# Patient Record
Sex: MALE | Race: WHITE | NOT HISPANIC OR LATINO | Employment: OTHER | URBAN - METROPOLITAN AREA
[De-identification: names, ages, dates, MRNs, and addresses within clinical notes are randomized per-mention and may not be internally consistent; named-entity substitution may affect disease eponyms.]

---

## 2017-08-17 ENCOUNTER — HOSPITAL ENCOUNTER (EMERGENCY)
Facility: CLINIC | Age: 67
Discharge: HOME | End: 2017-08-17
Attending: EMERGENCY MEDICINE | Admitting: EMERGENCY MEDICINE
Payer: MEDICARE

## 2017-08-17 VITALS
TEMPERATURE: 97.9 F | DIASTOLIC BLOOD PRESSURE: 109 MMHG | SYSTOLIC BLOOD PRESSURE: 164 MMHG | HEART RATE: 121 BPM | RESPIRATION RATE: 18 BRPM | OXYGEN SATURATION: 99 %

## 2017-08-17 DIAGNOSIS — S51.811A LACERATION OF RIGHT FOREARM, INITIAL ENCOUNTER: ICD-10-CM

## 2017-08-17 PROCEDURE — 99283 EMERGENCY DEPT VISIT LOW MDM: CPT

## 2017-08-17 PROCEDURE — 12002 RPR S/N/AX/GEN/TRNK2.6-7.5CM: CPT

## 2017-08-17 ASSESSMENT — ENCOUNTER SYMPTOMS
SHORTNESS OF BREATH: 0
NUMBNESS: 0
WOUND: 1
HEADACHES: 0
WEAKNESS: 0

## 2017-08-17 NOTE — ED AVS SNAPSHOT
Emergency Department    [address] [facility] [address]    Phone:  [phone]    Fax:  [phone]                                       Negro Aj   MRN: [id number]    Department:   Emergency Department   Date of Visit:  8/17/2017           After Visit Summary Signature Page     I have received my discharge instructions, and my questions have been answered. I have discussed any challenges I see with this plan with the nurse or doctor.    ..........................................................................................................................................  Patient/Patient Representative Signature      ..........................................................................................................................................  Patient Representative Print Name and Relationship to Patient    ..................................................               ................................................  Date                                            Time    ..........................................................................................................................................  Reviewed by Signature/Title    ...................................................              ..............................................  Date                                                            Time

## 2017-08-17 NOTE — ED PROVIDER NOTES
History     Chief Complaint:  Laceration (right arm after falling outside)      MATIAS Aj is a 67 year old male who presents with a right elbow laceration status post fall just prior to ED arrival. [facility] to the ED for laceration repair. The patient notes that he was out for a walk and lost his balance and fell while going up the stairs. He did not hit his head or lose consciousness and is not on any blood thinning medications. His only injury in the right elbow laceration. He denies pain or tenderness over the elbow. He reports pain is mild to moderate and bleeding is no longer present.     Allergies:  No known drug allergies      Medications:    Oxycodone  Aspirin 325 mg  Atarax  Simvastatin  Prozac  Lisinopril-hydrochlorothiazide  Zyprexa  Omeprazole  Ambien     Past Medical History:    Anxiety  Major depressive disorder  OCD  Right knee pain  Depression with anxiety    Past Surgical History:    Right knee arthroplasty  Right knee arthroscopy     Family History:    Depression, anxiety, bipolar    Social History:  Smoking status: Never smoker  Alcohol use: Yes, occasionally    Marital Status:  Single     Review of Systems   Respiratory: Negative for shortness of breath.    Cardiovascular: Negative for chest pain.   Skin: Positive for wound.   Neurological: Negative for syncope, weakness, numbness and headaches.   All other systems reviewed and are negative.      Physical Exam   First Vitals:  BP: (!) 164/109  Pulse: 121  Temp: 97.9  F (36.6  C)  Resp: 18  SpO2: 99 %      Physical Exam  General: Patient in mild distress.  Alert and cooperative with exam. Normal mentation  HEENT: NC/AT. Conjunctiva without injection or scleral icterus. External ears normal.  Respiratory: Breathing comfortably on room air  CV: Normal rate, all extremities well perfused  GI:  Non-distended abdomen  Skin: Warm, dry, no rashes/open wounds on exposed  skin  Musculoskeletal: RUE: CMS intact. 7 cm deep U-shaped laceration to the proximal forearm without evidence of bone/joint involvement or significant contamination on exam. Full active/passive range of motion of the elbow and shoulder.   Neuro: Alert, answers questions appropriately. No gross motor deficits    Emergency Department Course     Procedures:    Narrative: Procedure: Laceration Repair        LACERATION:  A U-shaped clean 7 cm laceration.      LOCATION:  Right elbow      FUNCTION:  Distally sensation, circulation, motor and tendon function are intact.      ANESTHESIA:  Local using 0.25% Marcaine plain total of 5 mLs      PREPARATION:  Irrigation with Normal Saline and Shur Clens      DEBRIDEMENT:  no debridement      CLOSURE:  Wound was closed with Two Layers: Subcutaneous layer closed with 3 x 5.0 Vicryl horizontal mattress Sutures. Skin closed with 14 x 4.0 Nylon using interrupted sutures     Emergency Department Course:  Past medical records, nursing notes, and vitals reviewed.  1000: I performed an exam of the patient and obtained history, as documented above.    Laceration repaired as above. Findings and plan explained to the Patient. Patient discharged home with instructions regarding supportive care, medications, and reasons to return. The importance of close follow-up was reviewed.      Impression & Plan      Medical Decision Making:  Negro Aj is a 67 year old male who presents for evaluation of a laceration to the right elbow after he lost his balance and fell. There was no report of injury to his head nor any loss of consciousness; he is not on blood thinners. The wound was carefully evaluated and explored.  The laceration was deep and was closed with Vicryl and Nylon sutures as noted above.  There is no evidence of muscular, tendon, or bony damage with this laceration.  No signs of foreign body.  Possible complications (infection, scarring) were reviewed with the patient.   Follow up with primary care as noted in the discharge section. Tetanus UTD.    Diagnosis:    ICD-10-CM    1. Laceration of right forearm, initial encounter S51.811A      Disposition:  Discharge home    Deep Rodrick  8/17/2017    EMERGENCY DEPARTMENT  Deep ARANGO am serving as a scribe at 10:57 AM on 8/17/2017 to document services personally performed by Kenyon Leong DO based on my observations and the provider's statements to me.       Kenyon Leong DO  08/17/17 2222

## 2017-08-17 NOTE — ED AVS SNAPSHOT
Emergency Department    [address] [facility] [address]    Phone:  [phone]    Fax:  [phone]                                       Negro Aj   MRN: [id number]    Department:   Emergency Department   Date of Visit:  8/17/2017           Patient Information     Date Of Birth          1950        Your diagnoses for this visit were:     Laceration of right forearm, initial encounter        You were seen by Kenyon Leong DO.      Follow-up Information     Follow up with Florentin Colby MD In 10 days.    Specialty:  Family Practice    Why:  suture removal    Contact information:    [facility] [address]  [address]  [facility] [address] [phone]          Follow up with  Emergency Department.    Specialty:  EMERGENCY MEDICINE    Why:  If symptoms worsen    Contact information:    [address] [facility] [id number] [phone]        Discharge Instructions       Discharge Instructions  Laceration (Cut)    You were seen today for a laceration (cut).  Your doctor examined your laceration for any problems such a buried foreign body (like glass, a splinter, or gravel), or injury to blood vessels, tendons, and nerves.  Your doctor may have also rinsed and/or scrubbed your laceration to help prevent an infection.  Your laceration may have been closed with glue, staples or sutures (stitches).      It may not be possible to find all problems with your laceration on the first visit, and we can't always prevent infections.  Antibiotics are only given when the benefit is more than the risk, and don't prevent all infections. Some lacerations are too high risk to close, and are left open to heal.  All lacerations, no matter how expertly repaired, will cause scarring.    Return to the Emergency Department right away if:    You have more redness, swelling, pain, drainage (pus), a bad smell, or red streaking from your laceration.      You  have a fever of 101oF or more.    You have bleeding that you can t stop at home. If your cut starts to bleed, hold pressure on the bleeding area with a clean cloth or put pressure over the bandage.  If the bleeding doesn t stop after using constant pressure for 30 minutes, you should return to the Emergency Department for further treatment.    An area past the laceration is cool, pale, or blue compared with the other side, or has a slower return of color when squeezed.    Your dressing seems too tight or starts to get uncomfortable or painful.    You have loss of normal function or use of an area, such as being unable to straighten or bend a finger normally.    You have a numb area past the laceration.    Return to the Emergency Department or see your regular doctor if:    The laceration starts to come open.     You have something coming out of the cut or a feeling that there is something in the laceration.    Your wound will not heal, or keeps breaking open. There can always be glass, wood, dirt or other things in any wound.  They won t always show up, even on x-rays.  If a wound doesn t heal, this may be why, and it is important to follow-up with your regular doctor.    Home Care:    Take your dressing off in 12 hours, or as instructed by your doctor, to check your laceration. Remove the dressing sooner if it seems too tight or painful, or if it is getting numb, tingly, or pale past the dressing.    Gently wash your laceration 2 times a day with clean cloth and soap.     It is okay to shower, but do not let the laceration soak in water.      If your laceration was closed with wound adhesive or strips: pat it dry and leave it open to the air.     For all other repairs: after you wash your laceration, or at least 2 times a day, apply bacitracin or other antibiotic ointment to the laceration, then cover it with a Band-Aid  or gauze.    Keep the laceration clean. Wear gloves or other protective clothing if you are  "around dirt.    Follow-up:    You need to follow-up with your regular doctor in 10 days.    Your sutures or staples need to be removed in 10 days. Schedule an appointment with your regular doctor to have this done.    Scars:  To help minimize scarring:    Wear sunscreen over the healed laceration when out in the sun.    Massage the area regularly.    You may use Vitamin E oil.    Wait a year.  Most scars will start to fade within a year.    Probiotics: If you have been given an antibiotic, you may want to also take a probiotic pill or eat yogurt with live cultures. Probiotics have \"good bacteria\" to help your intestines stay healthy. Studies have shown that probiotics help prevent diarrhea and other intestine problems (including C. diff infection) when you take antibiotics. You can buy these without a prescription in the pharmacy section of the store.     If you were given a prescription for medicine here today, be sure to read all of the information (including the package insert) that comes with your prescription.  This will include important information about the medicine, its side effects, and any warnings that you need to know about.  The pharmacist who fills the prescription can provide more information and answer questions you may have about the medicine.  If you have questions or concerns that the pharmacist cannot address, please call or return to the Emergency Department.     Return to the emergency department or seek medical care as instructed if your symptoms fail to improve or significantly worsen.    Take Acetaminophen (aka Tylenol) as needed for symptom/pain relief; use as directed.    Apply over the counter antibiotic ointment to laceration twice daily for two days.    Follow-up as indicated on page 1.  Maintain adequate hydration and get plenty of rest.      24 Hour Appointment Hotline       To make an appointment at any [facility], call [phone] ([phone]). If you don't have a " family doctor or clinic, we will help you find one. [facility] are conveniently located to serve the needs of you and your family.             Review of your medicines      Our records show that you are taking the medicines listed below. If these are incorrect, please call your family doctor or clinic.        Dose / Directions Last dose taken    AMBIEN PO   Dose:  10 mg        Take 10 mg by mouth nightly as needed for sleep   Refills:  0        aspirin 325 MG EC tablet   Dose:  325 mg   Quantity:  42 tablet        Take 1 tablet (325 mg) by mouth daily Take 1 tab daily x 6 wks   Refills:  0        hydrOXYzine 25 MG tablet   Commonly known as:  ATARAX   Dose:  25 mg   Quantity:  60 tablet        Take 1 tablet (25 mg) by mouth every 6 hours as needed for itching or other (spasm/pain)   Refills:  0        hypromellose-dextran Soln ophthalmic solution   Dose:  1 drop   Quantity:  1 Bottle        Place 1 drop into the right eye 4 times daily as needed for dry eyes   Refills:  1        lisinopril-hydrochlorothiazide 20-12.5 MG per tablet   Commonly known as:  PRINZIDE/ZESTORETIC   Dose:  0.5 tablet        Take 0.5 tablets by mouth daily   Refills:  0        order for DME   Quantity:  1 each        Equipment being ordered: Walker Wheels () and Walker () Treatment Diagnosis: Impaired gait   Refills:  0        oxyCODONE 5 MG IR tablet   Commonly known as:  ROXICODONE   Dose:  5-10 mg   Quantity:  50 tablet        Take 1-2 tablets (5-10 mg) by mouth every 3 hours as needed for moderate to severe pain Take 1 tab for pain rated 0-5, and 2 tabs for pain rated 5-10.   Refills:  0        PRILOSEC PO   Dose:  20 mg        Take 20 mg by mouth every morning   Refills:  0        PROZAC PO   Dose:  40 mg        Take 40 mg by mouth 2 times daily Pt takes 40 mg in am, then 3 hours later takes second 40 mg to = 80 mg/day   Refills:  0        senna-docusate 8.6-50 MG per tablet   Commonly known as:  SENOKOT-S;PERICOLACE    Dose:  1-2 tablet   Quantity:  60 tablet        Take 1-2 tablets by mouth 2 times daily Continue while taking narcotic pain medications. Hold for loose stools.   Refills:  1        SIMVASTATIN PO   Dose:  40 mg        Take 40 mg by mouth daily   Refills:  0        ZYPREXA PO   Dose:  20 mg        Take 20 mg by mouth daily (takes 2 x 10mg tablet)   Refills:  0                Orders Needing Specimen Collection     None      Pending Results     No orders found from 8/15/2017 to 8/18/2017.            Pending Culture Results     No orders found from 8/15/2017 to 8/18/2017.            Pending Results Instructions     If you had any lab results that were not finalized at the time of your Discharge, you can call the ED Lab Result RN at [phone]. You will be contacted by this team for any positive Lab results or changes in treatment. The nurses are available 7 days a week from 10A to 6:30P.  You can leave a message 24 hours per day and they will return your call.        Test Results From Your Hospital Stay               Clinical Quality Measure: Blood Pressure Screening     Your blood pressure was checked while you were in the emergency department today. The last reading we obtained was  BP: (!) 164/109 . Please read the guidelines below about what these numbers mean and what you should do about them.  If your systolic blood pressure (the top number) is less than 120 and your diastolic blood pressure (the bottom number) is less than 80, then your blood pressure is normal. There is nothing more that you need to do about it.  If your systolic blood pressure (the top number) is 120-139 or your diastolic blood pressure (the bottom number) is 80-89, your blood pressure may be higher than it should be. You should have your blood pressure rechecked within a year by a primary care provider.  If your systolic blood pressure (the top number) is 140 or greater or your diastolic blood pressure (the bottom number) is 90 or greater,  you may have high blood pressure. High blood pressure is treatable, but if left untreated over time it can put you at risk for heart attack, stroke, or kidney failure. You should have your blood pressure rechecked by a primary care provider within the next 4 weeks.  If your provider in the emergency department today gave you specific instructions to follow-up with your doctor or provider even sooner than that, you should follow that instruction and not wait for up to 4 weeks for your follow-up visit.        Thank you for choosing [facility]       Thank you for choosing [facility] for your care. Our goal is always to provide you with excellent care. Hearing back from our patients is one way we can continue to improve our services. Please take a few minutes to complete the written survey that you may receive in the mail after you visit with us. Thank you!        Consult A [facility] Information     [employer] gives you secure access to your electronic health record. If you see a primary care provider, you can also send messages to your care team and make appointments. If you have questions, please call your primary care clinic.  If you do not have a primary care provider, please call [phone] and they will assist you.        Care EveryWhere ID     This is your Care EveryWhere ID. This could be used by other organizations to access your [facility] medical records  [id number]        Equal Access to Services     BINH ALARCON : Riley Nelson, lasha mcnally, qatati dillon, parisa aguilar. So [facility] [phone].    ATENCIÓN: Si habla español, tiene a grimm disposición servicios gratuitos de asistencia lingüística. Llame al [phone].    We comply with applicable federal civil rights laws and [place] laws. We do not discriminate on the basis of race, color, national origin, age, disability sex, sexual orientation or gender identity.            After Visit Summary       This is your  record. Keep this with you and show to your community pharmacist(s) and doctor(s) at your next visit.

## 2017-08-17 NOTE — DISCHARGE INSTRUCTIONS
Discharge Instructions  Laceration (Cut)    You were seen today for a laceration (cut).  Your doctor examined your laceration for any problems such a buried foreign body (like glass, a splinter, or gravel), or injury to blood vessels, tendons, and nerves.  Your doctor may have also rinsed and/or scrubbed your laceration to help prevent an infection.  Your laceration may have been closed with glue, staples or sutures (stitches).      It may not be possible to find all problems with your laceration on the first visit, and we can't always prevent infections.  Antibiotics are only given when the benefit is more than the risk, and don't prevent all infections. Some lacerations are too high risk to close, and are left open to heal.  All lacerations, no matter how expertly repaired, will cause scarring.    Return to the Emergency Department right away if:    You have more redness, swelling, pain, drainage (pus), a bad smell, or red streaking from your laceration.      You have a fever of 101oF or more.    You have bleeding that you can t stop at home. If your cut starts to bleed, hold pressure on the bleeding area with a clean cloth or put pressure over the bandage.  If the bleeding doesn t stop after using constant pressure for 30 minutes, you should return to the Emergency Department for further treatment.    An area past the laceration is cool, pale, or blue compared with the other side, or has a slower return of color when squeezed.    Your dressing seems too tight or starts to get uncomfortable or painful.    You have loss of normal function or use of an area, such as being unable to straighten or bend a finger normally.    You have a numb area past the laceration.    Return to the Emergency Department or see your regular doctor if:    The laceration starts to come open.     You have something coming out of the cut or a feeling that there is something in the laceration.    Your wound will not heal, or keeps breaking  "open. There can always be glass, wood, dirt or other things in any wound.  They won t always show up, even on x-rays.  If a wound doesn t heal, this may be why, and it is important to follow-up with your regular doctor.    Home Care:    Take your dressing off in 12 hours, or as instructed by your doctor, to check your laceration. Remove the dressing sooner if it seems too tight or painful, or if it is getting numb, tingly, or pale past the dressing.    Gently wash your laceration 2 times a day with clean cloth and soap.     It is okay to shower, but do not let the laceration soak in water.      If your laceration was closed with wound adhesive or strips: pat it dry and leave it open to the air.     For all other repairs: after you wash your laceration, or at least 2 times a day, apply bacitracin or other antibiotic ointment to the laceration, then cover it with a Band-Aid  or gauze.    Keep the laceration clean. Wear gloves or other protective clothing if you are around dirt.    Follow-up:    You need to follow-up with your regular doctor in 10 days.    Your sutures or staples need to be removed in 10 days. Schedule an appointment with your regular doctor to have this done.    Scars:  To help minimize scarring:    Wear sunscreen over the healed laceration when out in the sun.    Massage the area regularly.    You may use Vitamin E oil.    Wait a year.  Most scars will start to fade within a year.    Probiotics: If you have been given an antibiotic, you may want to also take a probiotic pill or eat yogurt with live cultures. Probiotics have \"good bacteria\" to help your intestines stay healthy. Studies have shown that probiotics help prevent diarrhea and other intestine problems (including C. diff infection) when you take antibiotics. You can buy these without a prescription in the pharmacy section of the store.     If you were given a prescription for medicine here today, be sure to read all of the information " (including the package insert) that comes with your prescription.  This will include important information about the medicine, its side effects, and any warnings that you need to know about.  The pharmacist who fills the prescription can provide more information and answer questions you may have about the medicine.  If you have questions or concerns that the pharmacist cannot address, please call or return to the Emergency Department.     Return to the emergency department or seek medical care as instructed if your symptoms fail to improve or significantly worsen.    Take Acetaminophen (aka Tylenol) as needed for symptom/pain relief; use as directed.    Apply over the counter antibiotic ointment to laceration twice daily for two days.    Follow-up as indicated on page 1.  Maintain adequate hydration and get plenty of rest.

## 2017-10-27 ENCOUNTER — HOSPITAL ENCOUNTER (OUTPATIENT)
Facility: CLINIC | Age: 67
Discharge: PSYCHIATRIC OR SUBSTANCE USE FACILITY | DRG: 885 | End: 2017-10-28
Attending: EMERGENCY MEDICINE | Admitting: INTERNAL MEDICINE
Payer: MEDICARE

## 2017-10-27 DIAGNOSIS — R45.850 HOMICIDAL IDEATION: ICD-10-CM

## 2017-10-27 DIAGNOSIS — I10 BENIGN ESSENTIAL HYPERTENSION: Primary | ICD-10-CM

## 2017-10-27 DIAGNOSIS — R79.89 ELEVATED TROPONIN: ICD-10-CM

## 2017-10-27 DIAGNOSIS — M17.11 PRIMARY LOCALIZED OSTEOARTHRITIS OF RIGHT KNEE: ICD-10-CM

## 2017-10-27 DIAGNOSIS — F42.9 OBSESSIVE-COMPULSIVE DISORDER, UNSPECIFIED TYPE: ICD-10-CM

## 2017-10-27 RX ORDER — SODIUM CHLORIDE 9 MG/ML
1000 INJECTION, SOLUTION INTRAVENOUS CONTINUOUS
Status: DISCONTINUED | OUTPATIENT
Start: 2017-10-27 | End: 2017-10-28

## 2017-10-27 NOTE — IP AVS SNAPSHOT
MRN:[id number]                      After Visit Summary   10/27/2017    Negro Aj    MRN: [id number]           Thank you!     Thank you for choosing [facility] for your care. Our goal is always to provide you with excellent care. Hearing back from our patients is one way we can continue to improve our services. Please take a few minutes to complete the written survey that you may receive in the mail after you visit with us. Thank you!        Patient Information     Date Of Birth          1950        Designated Caregiver       Most Recent Value    Caregiver    Will someone help with your care after discharge? yes    Name of designated caregiver daughter adrianne    Phone number of caregiver [phone]    Caregiver address same      About your hospital stay     You were admitted on:  October 28, 2017 You last received care in the:  [facility]    You were discharged on:  October 28, 2017       Who to Call     For medical emergencies, please call 911.  For non-urgent questions about your medical care, please call your primary care provider or clinic, [phone]          Attending Provider     Provider Specialty    Trierweiler, Chad A, MD Emergency Medicine    De Anda, Fili Carroll MD Internal Medicine    Wong Alvarado MD Internal Medicine       Primary Care Provider Office Phone # Fax #    Florentin Colby [phone]      After Care Instructions     Activity - Up ad mercedes           Advance Diet as Tolerated       Follow this diet upon discharge: Regular diet                  Follow-up Appointments     Follow Up and recommended labs and tests       Follow-up with psychiatry on admission to mental health unit. Follow-up with primary care provider within 1 week of discharge from mental health unit, to discuss possible stress testing.                  Pending Results     No orders found for last 3 day(s).            Statement of  Approval     Ordered          10/28/17 1517  I have reviewed and agree with all the recommendations and orders detailed in this document.  EFFECTIVE NOW     Approved and electronically signed by:  Wong Alvarado MD             Admission Information     Date & Time Provider Department Dept. Phone    10/27/2017 Wong Alvarado MD [facility] [phone]      Your Vitals Were     Blood Pressure Temperature Respirations Weight Pulse Oximetry BMI (Body Mass Index)    126/84 (BP Location: Left arm) 98.8  F (37.1  C) (Oral) 16 74.8 kg (164 lb 14.5 oz) 97% 24.35 kg/m2      MyChart Information     [facility] gives you secure access to your electronic health record. If you see a primary care provider, you can also send messages to your care team and make appointments. If you have questions, please call your primary care clinic.  If you do not have a primary care provider, please call [phone] and they will assist you.        Care EveryWhere ID     This is your Care EveryWhere ID. This could be used by other organizations to access your [facility] medical records  [id number]        Equal Access to Services     BINH ALARCON : Riley Nelson, lasha mcnally, parisa connros. [facility] [phone].    ATENCIÓN: Si habla español, tiene a grimm disposición servicios gratuitos de asistencia lingüística. [place] [phone].    We comply with applicable federal civil rights laws and [place] laws. We do not discriminate on the basis of race, color, national origin, age, disability, sex, sexual orientation, or gender identity.               Review of your medicines      START taking        Dose / Directions    lisinopril 10 MG tablet   Commonly known as:  PRINIVIL/ZESTRIL   Used for:  Benign essential hypertension        Dose:  10 mg   Take 1 tablet (10 mg) by mouth daily   Quantity:  30 tablet   Refills:  0       LORazepam 0.5 MG  tablet   Commonly known as:  ATIVAN   Used for:  Obsessive-compulsive disorder, unspecified type        Dose:  0.5 mg   Take 1 tablet (0.5 mg) by mouth 3 times daily   Quantity:  60 tablet   Refills:  0       metoprolol 25 MG tablet   Commonly known as:  LOPRESSOR   Used for:  Benign essential hypertension        Dose:  25 mg   Take 1 tablet (25 mg) by mouth 2 times daily   Quantity:  60 tablet   Refills:  0         CONTINUE these medicines which may have CHANGED, or have new prescriptions. If we are uncertain of the size of tablets/capsules you have at home, strength may be listed as something that might have changed.        Dose / Directions    aspirin EC 81 MG EC tablet   This may have changed:    - medication strength  - how much to take  - additional instructions   Used for:  Primary localized osteoarthritis of right knee        Dose:  81 mg   Take 1 tablet (81 mg) by mouth daily   Refills:  0       * OLANZapine 10 MG tablet   Commonly known as:  zyPREXA   This may have changed:    - medication strength  - how much to take  - when to take this   Used for:  Obsessive-compulsive disorder, unspecified type        Dose:  10 mg   Take 1 tablet (10 mg) by mouth At Bedtime (takes 2 x 10mg tablet)   Refills:  0       * OLANZapine zydis 5 MG ODT tab   Commonly known as:  zyPREXA   This may have changed:  You were already taking a medication with the same name, and this prescription was added. Make sure you understand how and when to take each.   Used for:  Obsessive-compulsive disorder, unspecified type        Dose:  5 mg   Take 1 tablet (5 mg) by mouth every 6 hours as needed for agitation   Refills:  0       * Notice:  This list has 2 medication(s) that are the same as other medications prescribed for you. Read the directions carefully, and ask your doctor or other care provider to review them with you.      CONTINUE these medicines which have NOT CHANGED        Dose / Directions    hypromellose-dextran Soln  ophthalmic solution        Dose:  1 drop   Place 1 drop into the right eye 4 times daily as needed for dry eyes   Quantity:  1 Bottle   Refills:  1       order for DME   Used for:  Status post total right knee replacement        Equipment being ordered: Walker Wheels () and Walker () Treatment Diagnosis: Impaired gait   Quantity:  1 each   Refills:  0       PRILOSEC PO        Dose:  20 mg   Take 20 mg by mouth every morning   Refills:  0       SIMVASTATIN PO        Dose:  40 mg   Take 40 mg by mouth daily   Refills:  0         STOP taking     AMBIEN PO           hydrOXYzine 25 MG tablet   Commonly known as:  ATARAX           lisinopril-hydrochlorothiazide 20-12.5 MG per tablet   Commonly known as:  PRINZIDE/ZESTORETIC           oxyCODONE 5 MG IR tablet   Commonly known as:  ROXICODONE           PROZAC PO           senna-docusate 8.6-50 MG per tablet   Commonly known as:  SENOKOT-S;PERICOLACE                Where to get your medicines      Some of these will need a paper prescription and others can be bought over the counter. Ask your nurse if you have questions.     You don't need a prescription for these medications     aspirin EC 81 MG EC tablet    lisinopril 10 MG tablet    metoprolol 25 MG tablet    OLANZapine 10 MG tablet    OLANZapine zydis 5 MG ODT tab         Information about where to get these medications is not yet available     ! Ask your nurse or doctor about these medications     LORazepam 0.5 MG tablet                Protect others around you: Learn how to safely use, store and throw away your medicines at www.disposemymeds.org.             Medication List: This is a list of all your medications and when to take them. Check marks below indicate your daily home schedule. Keep this list as a reference.      Medications           Morning Afternoon Evening Bedtime As Needed    aspirin EC 81 MG EC tablet   Take 1 tablet (81 mg) by mouth daily                                hypromellose-dextran  Soln ophthalmic solution   Place 1 drop into the right eye 4 times daily as needed for dry eyes                                lisinopril 10 MG tablet   Commonly known as:  PRINIVIL/ZESTRIL   Take 1 tablet (10 mg) by mouth daily   Last time this was given:  10 mg on 10/28/2017  8:48 AM                                LORazepam 0.5 MG tablet   Commonly known as:  ATIVAN   Take 1 tablet (0.5 mg) by mouth 3 times daily                                metoprolol 25 MG tablet   Commonly known as:  LOPRESSOR   Take 1 tablet (25 mg) by mouth 2 times daily                                * OLANZapine 10 MG tablet   Commonly known as:  zyPREXA   Take 1 tablet (10 mg) by mouth At Bedtime (takes 2 x 10mg tablet)                                * OLANZapine zydis 5 MG ODT tab   Commonly known as:  zyPREXA   Take 1 tablet (5 mg) by mouth every 6 hours as needed for agitation   Last time this was given:  10 mg on 10/28/2017  1:26 AM                                order for DME   Equipment being ordered: Walker Wheels () and Walker () Treatment Diagnosis: Impaired gait                                PRILOSEC PO   Take 20 mg by mouth every morning                                SIMVASTATIN PO   Take 40 mg by mouth daily                                * Notice:  This list has 2 medication(s) that are the same as other medications prescribed for you. Read the directions carefully, and ask your doctor or other care provider to review them with you.

## 2017-10-27 NOTE — IP AVS SNAPSHOT
[facility]    [address] [place] [address]., [address]    [place] [address]    Phone:  [phone]                                       After Visit Summary   10/27/2017    Negro Aj    MRN: [id number]           After Visit Summary Signature Page     I have received my discharge instructions, and my questions have been answered. I have discussed any challenges I see with this plan with the nurse or doctor.    ..........................................................................................................................................  Patient/Patient Representative Signature      ..........................................................................................................................................  Patient Representative Print Name and Relationship to Patient    ..................................................               ................................................  Date                                            Time    ..........................................................................................................................................  Reviewed by Signature/Title    ...................................................              ..............................................  Date                                                            Time

## 2017-10-28 ENCOUNTER — APPOINTMENT (OUTPATIENT)
Dept: CARDIOLOGY | Facility: CLINIC | Age: 67
DRG: 885 | End: 2017-10-28
Attending: INTERNAL MEDICINE
Payer: MEDICARE

## 2017-10-28 ENCOUNTER — HOSPITAL ENCOUNTER (INPATIENT)
Facility: CLINIC | Age: 67
LOS: 17 days | Discharge: HOME | DRG: 885 | End: 2017-11-14
Attending: PSYCHIATRY & NEUROLOGY | Admitting: PSYCHIATRY & NEUROLOGY
Payer: MEDICARE

## 2017-10-28 VITALS
SYSTOLIC BLOOD PRESSURE: 126 MMHG | BODY MASS INDEX: 24.35 KG/M2 | DIASTOLIC BLOOD PRESSURE: 84 MMHG | TEMPERATURE: 98.8 F | WEIGHT: 164.9 LBS | RESPIRATION RATE: 16 BRPM | OXYGEN SATURATION: 97 %

## 2017-10-28 DIAGNOSIS — F33.2 SEVERE EPISODE OF RECURRENT MAJOR DEPRESSIVE DISORDER, WITHOUT PSYCHOTIC FEATURES (H): ICD-10-CM

## 2017-10-28 DIAGNOSIS — F51.05 INSOMNIA DUE TO MENTAL DISORDER: ICD-10-CM

## 2017-10-28 DIAGNOSIS — F42.2 MIXED OBSESSIONAL THOUGHTS AND ACTS: Primary | ICD-10-CM

## 2017-10-28 PROBLEM — F41.9 ANXIETY AND DEPRESSION: Status: ACTIVE | Noted: 2017-10-28

## 2017-10-28 PROBLEM — R79.89 ELEVATED TROPONIN: Status: ACTIVE | Noted: 2017-10-28

## 2017-10-28 PROBLEM — F32.A ANXIETY AND DEPRESSION: Status: ACTIVE | Noted: 2017-10-28

## 2017-10-28 PROBLEM — R79.89 TROPONIN LEVEL ELEVATED: Status: ACTIVE | Noted: 2017-10-28

## 2017-10-28 LAB
ALBUMIN SERPL-MCNC: 3.7 G/DL (ref 3.4–5)
ALP SERPL-CCNC: 86 U/L (ref 40–150)
ALT SERPL W P-5'-P-CCNC: 50 U/L (ref 0–70)
ANION GAP SERPL CALCULATED.3IONS-SCNC: 9 MMOL/L (ref 3–14)
AST SERPL W P-5'-P-CCNC: 33 U/L (ref 0–45)
BASOPHILS # BLD AUTO: 0 10E9/L (ref 0–0.2)
BASOPHILS NFR BLD AUTO: 0.3 %
BILIRUB SERPL-MCNC: 0.5 MG/DL (ref 0.2–1.3)
BUN SERPL-MCNC: 24 MG/DL (ref 7–30)
CALCIUM SERPL-MCNC: 8.7 MG/DL (ref 8.5–10.1)
CHLORIDE SERPL-SCNC: 106 MMOL/L (ref 94–109)
CO2 SERPL-SCNC: 25 MMOL/L (ref 20–32)
CREAT SERPL-MCNC: 0.94 MG/DL (ref 0.66–1.25)
DIFFERENTIAL METHOD BLD: NORMAL
EOSINOPHIL # BLD AUTO: 0.1 10E9/L (ref 0–0.7)
EOSINOPHIL NFR BLD AUTO: 0.8 %
ERYTHROCYTE [DISTWIDTH] IN BLOOD BY AUTOMATED COUNT: 13.7 % (ref 10–15)
ETHANOL SERPL-MCNC: <0.01 G/DL
GFR SERPL CREATININE-BSD FRML MDRD: 80 ML/MIN/1.7M2
GLUCOSE SERPL-MCNC: 145 MG/DL (ref 70–99)
HCT VFR BLD AUTO: 41.5 % (ref 40–53)
HGB BLD-MCNC: 14.2 G/DL (ref 13.3–17.7)
IMM GRANULOCYTES # BLD: 0 10E9/L (ref 0–0.4)
IMM GRANULOCYTES NFR BLD: 0.2 %
INTERPRETATION ECG - MUSE: NORMAL
LYMPHOCYTES # BLD AUTO: 1.4 10E9/L (ref 0.8–5.3)
LYMPHOCYTES NFR BLD AUTO: 14 %
MCH RBC QN AUTO: 31.3 PG (ref 26.5–33)
MCHC RBC AUTO-ENTMCNC: 34.2 G/DL (ref 31.5–36.5)
MCV RBC AUTO: 91 FL (ref 78–100)
MONOCYTES # BLD AUTO: 1.2 10E9/L (ref 0–1.3)
MONOCYTES NFR BLD AUTO: 12.3 %
NEUTROPHILS # BLD AUTO: 7.2 10E9/L (ref 1.6–8.3)
NEUTROPHILS NFR BLD AUTO: 72.4 %
PLATELET # BLD AUTO: 237 10E9/L (ref 150–450)
POTASSIUM SERPL-SCNC: 3.3 MMOL/L (ref 3.4–5.3)
POTASSIUM SERPL-SCNC: 4 MMOL/L (ref 3.4–5.3)
PROT SERPL-MCNC: 7.5 G/DL (ref 6.8–8.8)
RBC # BLD AUTO: 4.54 10E12/L (ref 4.4–5.9)
SODIUM SERPL-SCNC: 140 MMOL/L (ref 133–144)
TROPONIN I SERPL-MCNC: 0.06 UG/L (ref 0–0.04)
TROPONIN I SERPL-MCNC: 0.09 UG/L (ref 0–0.04)
TROPONIN I SERPL-MCNC: 0.09 UG/L (ref 0–0.04)
TSH SERPL DL<=0.005 MIU/L-ACNC: 0.85 MU/L (ref 0.4–4)
TSH SERPL DL<=0.005 MIU/L-ACNC: 1.07 MU/L (ref 0.4–4)
WBC # BLD AUTO: 9.9 10E9/L (ref 4–11)

## 2017-10-28 PROCEDURE — 93306 TTE W/DOPPLER COMPLETE: CPT | Mod: 26 | Performed by: INTERNAL MEDICINE

## 2017-10-28 PROCEDURE — 99207 ZZC APP CREDIT; MD BILLING SHARED VISIT: CPT | Performed by: INTERNAL MEDICINE

## 2017-10-28 PROCEDURE — 99221 1ST HOSP IP/OBS SF/LOW 40: CPT | Performed by: PSYCHIATRY & NEUROLOGY

## 2017-10-28 PROCEDURE — 99236 HOSP IP/OBS SAME DATE HI 85: CPT | Performed by: INTERNAL MEDICINE

## 2017-10-28 PROCEDURE — 25000132 ZZH RX MED GY IP 250 OP 250 PS 637: Performed by: PSYCHIATRY & NEUROLOGY

## 2017-10-28 PROCEDURE — 12400006 ZZH R&B MH INTERMEDIATE

## 2017-10-28 RX ORDER — MAGNESIUM CARB/ALUMINUM HYDROX 105-160MG
148 TABLET,CHEWABLE ORAL
Status: DISCONTINUED | OUTPATIENT
Start: 2017-10-28 | End: 2017-10-28 | Stop reason: HOSPADM

## 2017-10-28 RX ORDER — LISINOPRIL AND HYDROCHLOROTHIAZIDE 12.5; 2 MG/1; MG/1
0.5 TABLET ORAL DAILY
Status: DISCONTINUED | OUTPATIENT
Start: 2017-10-28 | End: 2017-10-28

## 2017-10-28 RX ORDER — CLONAZEPAM 0.5 MG/1
0.25 TABLET ORAL EVERY MORNING
Status: DISCONTINUED | OUTPATIENT
Start: 2017-10-29 | End: 2017-10-29

## 2017-10-28 RX ORDER — ASPIRIN 81 MG/1
81 TABLET ORAL DAILY
DISCHARGE
Start: 2017-10-28

## 2017-10-28 RX ORDER — ONDANSETRON 2 MG/ML
4 INJECTION INTRAMUSCULAR; INTRAVENOUS EVERY 6 HOURS PRN
Status: DISCONTINUED | OUTPATIENT
Start: 2017-10-28 | End: 2017-10-28 | Stop reason: HOSPADM

## 2017-10-28 RX ORDER — POTASSIUM CHLORIDE 7.45 MG/ML
10 INJECTION INTRAVENOUS
Status: DISCONTINUED | OUTPATIENT
Start: 2017-10-28 | End: 2017-10-28 | Stop reason: HOSPADM

## 2017-10-28 RX ORDER — AMOXICILLIN 250 MG
1-2 CAPSULE ORAL 2 TIMES DAILY PRN
Status: DISCONTINUED | OUTPATIENT
Start: 2017-10-28 | End: 2017-10-28 | Stop reason: HOSPADM

## 2017-10-28 RX ORDER — LISINOPRIL 10 MG/1
10 TABLET ORAL DAILY
Qty: 30 TABLET | Status: ON HOLD | DISCHARGE
Start: 2017-10-28 | End: 2024-08-03

## 2017-10-28 RX ORDER — METOPROLOL TARTRATE 25 MG/1
25 TABLET, FILM COATED ORAL 2 TIMES DAILY
Qty: 60 TABLET | DISCHARGE
Start: 2017-10-28

## 2017-10-28 RX ORDER — OLANZAPINE 10 MG/1
10 TABLET, ORALLY DISINTEGRATING ORAL ONCE
Status: COMPLETED | OUTPATIENT
Start: 2017-10-28 | End: 2017-10-28

## 2017-10-28 RX ORDER — SIMVASTATIN 40 MG
40 TABLET ORAL DAILY
Status: DISCONTINUED | OUTPATIENT
Start: 2017-10-28 | End: 2017-11-14 | Stop reason: HOSPADM

## 2017-10-28 RX ORDER — LISINOPRIL 10 MG/1
10 TABLET ORAL DAILY
Status: DISCONTINUED | OUTPATIENT
Start: 2017-10-28 | End: 2017-10-28 | Stop reason: HOSPADM

## 2017-10-28 RX ORDER — POTASSIUM CHLORIDE 29.8 MG/ML
20 INJECTION INTRAVENOUS
Status: DISCONTINUED | OUTPATIENT
Start: 2017-10-28 | End: 2017-10-28 | Stop reason: HOSPADM

## 2017-10-28 RX ORDER — HYDROXYZINE HYDROCHLORIDE 25 MG/1
25-50 TABLET, FILM COATED ORAL EVERY 4 HOURS PRN
Status: DISCONTINUED | OUTPATIENT
Start: 2017-10-28 | End: 2017-11-14 | Stop reason: HOSPADM

## 2017-10-28 RX ORDER — ASPIRIN 81 MG/1
81 TABLET, CHEWABLE ORAL DAILY
Status: DISCONTINUED | OUTPATIENT
Start: 2017-10-29 | End: 2017-10-28 | Stop reason: HOSPADM

## 2017-10-28 RX ORDER — POTASSIUM CL/LIDO/0.9 % NACL 10MEQ/0.1L
10 INTRAVENOUS SOLUTION, PIGGYBACK (ML) INTRAVENOUS
Status: DISCONTINUED | OUTPATIENT
Start: 2017-10-28 | End: 2017-10-28 | Stop reason: HOSPADM

## 2017-10-28 RX ORDER — OLANZAPINE 5 MG/1
5 TABLET, ORALLY DISINTEGRATING ORAL EVERY 6 HOURS PRN
Status: ON HOLD | DISCHARGE
Start: 2017-10-28 | End: 2017-11-14

## 2017-10-28 RX ORDER — ASPIRIN 81 MG/1
81 TABLET ORAL DAILY
Status: DISCONTINUED | OUTPATIENT
Start: 2017-10-28 | End: 2017-11-14 | Stop reason: HOSPADM

## 2017-10-28 RX ORDER — OLANZAPINE 10 MG/1
10 TABLET ORAL AT BEDTIME
Status: DISCONTINUED | OUTPATIENT
Start: 2017-10-28 | End: 2017-10-31

## 2017-10-28 RX ORDER — OLANZAPINE 10 MG/1
10 TABLET ORAL AT BEDTIME
Status: DISCONTINUED | OUTPATIENT
Start: 2017-10-28 | End: 2017-10-28 | Stop reason: HOSPADM

## 2017-10-28 RX ORDER — OLANZAPINE 5 MG/1
5 TABLET, ORALLY DISINTEGRATING ORAL 2 TIMES DAILY PRN
Status: DISCONTINUED | OUTPATIENT
Start: 2017-10-28 | End: 2017-10-28

## 2017-10-28 RX ORDER — POTASSIUM CHLORIDE 1500 MG/1
20-40 TABLET, EXTENDED RELEASE ORAL
Status: DISCONTINUED | OUTPATIENT
Start: 2017-10-28 | End: 2017-10-28 | Stop reason: HOSPADM

## 2017-10-28 RX ORDER — OLANZAPINE 10 MG/1
10 TABLET ORAL AT BEDTIME
Status: ON HOLD | DISCHARGE
Start: 2017-10-28 | End: 2017-11-14

## 2017-10-28 RX ORDER — SIMVASTATIN 40 MG
40 TABLET ORAL DAILY
Status: DISCONTINUED | OUTPATIENT
Start: 2017-10-28 | End: 2017-10-28 | Stop reason: HOSPADM

## 2017-10-28 RX ORDER — HYDROCHLOROTHIAZIDE 12.5 MG/1
6.25 TABLET ORAL DAILY
Status: DISCONTINUED | OUTPATIENT
Start: 2017-10-28 | End: 2017-10-28

## 2017-10-28 RX ORDER — POLYETHYLENE GLYCOL 3350 17 G/17G
17 POWDER, FOR SOLUTION ORAL DAILY PRN
Status: DISCONTINUED | OUTPATIENT
Start: 2017-10-28 | End: 2017-10-28 | Stop reason: HOSPADM

## 2017-10-28 RX ORDER — LORAZEPAM 0.5 MG/1
0.5 TABLET ORAL 3 TIMES DAILY
Qty: 60 TABLET | Status: ON HOLD | DISCHARGE
Start: 2017-10-28 | End: 2017-11-14

## 2017-10-28 RX ORDER — CLONAZEPAM 0.5 MG/1
0.5 TABLET ORAL
Status: DISCONTINUED | OUTPATIENT
Start: 2017-10-28 | End: 2017-10-29

## 2017-10-28 RX ORDER — METOPROLOL TARTRATE 25 MG/1
25 TABLET, FILM COATED ORAL 2 TIMES DAILY
Status: DISCONTINUED | OUTPATIENT
Start: 2017-10-28 | End: 2017-11-14 | Stop reason: HOSPADM

## 2017-10-28 RX ORDER — OLANZAPINE 5 MG/1
5 TABLET, ORALLY DISINTEGRATING ORAL EVERY 6 HOURS PRN
Status: DISCONTINUED | OUTPATIENT
Start: 2017-10-28 | End: 2017-11-14 | Stop reason: HOSPADM

## 2017-10-28 RX ORDER — ONDANSETRON 4 MG/1
4 TABLET, ORALLY DISINTEGRATING ORAL EVERY 6 HOURS PRN
Status: DISCONTINUED | OUTPATIENT
Start: 2017-10-28 | End: 2017-10-28 | Stop reason: HOSPADM

## 2017-10-28 RX ORDER — CLOMIPRAMINE HYDROCHLORIDE 50 MG/1
50 CAPSULE ORAL AT BEDTIME
Status: DISCONTINUED | OUTPATIENT
Start: 2017-10-28 | End: 2017-11-03

## 2017-10-28 RX ORDER — METOPROLOL TARTRATE 25 MG/1
25 TABLET, FILM COATED ORAL 2 TIMES DAILY
Status: DISCONTINUED | OUTPATIENT
Start: 2017-10-28 | End: 2017-10-28 | Stop reason: HOSPADM

## 2017-10-28 RX ORDER — HYDROXYZINE HYDROCHLORIDE 25 MG/1
25 TABLET, FILM COATED ORAL EVERY 6 HOURS PRN
Status: DISCONTINUED | OUTPATIENT
Start: 2017-10-28 | End: 2017-10-28

## 2017-10-28 RX ORDER — POTASSIUM CHLORIDE 1.5 G/1.58G
20-40 POWDER, FOR SOLUTION ORAL
Status: DISCONTINUED | OUTPATIENT
Start: 2017-10-28 | End: 2017-10-28 | Stop reason: HOSPADM

## 2017-10-28 RX ORDER — LORAZEPAM 0.5 MG/1
0.5 TABLET ORAL 3 TIMES DAILY
Status: DISCONTINUED | OUTPATIENT
Start: 2017-10-28 | End: 2017-10-28 | Stop reason: ALTCHOICE

## 2017-10-28 RX ORDER — LORAZEPAM 2 MG/ML
1-2 INJECTION INTRAMUSCULAR EVERY 4 HOURS PRN
Status: DISCONTINUED | OUTPATIENT
Start: 2017-10-28 | End: 2017-10-28 | Stop reason: HOSPADM

## 2017-10-28 RX ORDER — OLANZAPINE 5 MG/1
5 TABLET, ORALLY DISINTEGRATING ORAL EVERY 6 HOURS PRN
Status: DISCONTINUED | OUTPATIENT
Start: 2017-10-28 | End: 2017-10-28 | Stop reason: HOSPADM

## 2017-10-28 RX ORDER — NALOXONE HYDROCHLORIDE 0.4 MG/ML
.1-.4 INJECTION, SOLUTION INTRAMUSCULAR; INTRAVENOUS; SUBCUTANEOUS
Status: DISCONTINUED | OUTPATIENT
Start: 2017-10-28 | End: 2017-10-28 | Stop reason: HOSPADM

## 2017-10-28 RX ORDER — LISINOPRIL 10 MG/1
10 TABLET ORAL DAILY
Status: DISCONTINUED | OUTPATIENT
Start: 2017-10-29 | End: 2017-11-14 | Stop reason: HOSPADM

## 2017-10-28 RX ORDER — LORAZEPAM 0.5 MG/1
0.5 TABLET ORAL 3 TIMES DAILY
Status: DISCONTINUED | OUTPATIENT
Start: 2017-10-28 | End: 2017-10-28 | Stop reason: HOSPADM

## 2017-10-28 RX ORDER — ACETAMINOPHEN 325 MG/1
650 TABLET ORAL EVERY 4 HOURS PRN
Status: DISCONTINUED | OUTPATIENT
Start: 2017-10-28 | End: 2017-10-28 | Stop reason: HOSPADM

## 2017-10-28 RX ADMIN — TOPIRAMATE 150 MG: 100 TABLET, FILM COATED ORAL at 20:28

## 2017-10-28 RX ADMIN — OLANZAPINE 10 MG: 10 TABLET, ORALLY DISINTEGRATING ORAL at 01:26

## 2017-10-28 RX ADMIN — SODIUM CHLORIDE 1000 ML: 9 INJECTION, SOLUTION INTRAVENOUS at 08:04

## 2017-10-28 RX ADMIN — POTASSIUM CHLORIDE 40 MEQ: 1500 TABLET, EXTENDED RELEASE ORAL at 03:12

## 2017-10-28 RX ADMIN — SIMVASTATIN 40 MG: 40 TABLET, FILM COATED ORAL at 21:20

## 2017-10-28 RX ADMIN — OLANZAPINE 5 MG: 5 TABLET, ORALLY DISINTEGRATING ORAL at 18:16

## 2017-10-28 RX ADMIN — POTASSIUM CHLORIDE 20 MEQ: 1500 TABLET, EXTENDED RELEASE ORAL at 05:20

## 2017-10-28 RX ADMIN — SODIUM CHLORIDE 1000 ML: 9 INJECTION, SOLUTION INTRAVENOUS at 01:29

## 2017-10-28 RX ADMIN — CLOMIPRAMINE HYDROCHLORIDE 50 MG: 50 CAPSULE ORAL at 20:37

## 2017-10-28 RX ADMIN — CLONAZEPAM 0.5 MG: 0.5 TABLET ORAL at 20:37

## 2017-10-28 RX ADMIN — LORAZEPAM 1 MG: 2 INJECTION INTRAMUSCULAR; INTRAVENOUS at 12:58

## 2017-10-28 RX ADMIN — Medication 6.25 MG: at 08:52

## 2017-10-28 RX ADMIN — LISINOPRIL 10 MG: 10 TABLET ORAL at 08:48

## 2017-10-28 RX ADMIN — METOPROLOL TARTRATE 25 MG: 25 TABLET ORAL at 20:28

## 2017-10-28 RX ADMIN — OLANZAPINE 10 MG: 10 TABLET, FILM COATED ORAL at 20:29

## 2017-10-28 RX ADMIN — ASPIRIN 81 MG: 81 TABLET, COATED ORAL at 18:16

## 2017-10-28 RX ADMIN — SODIUM CHLORIDE 1000 ML: 9 INJECTION, SOLUTION INTRAVENOUS at 00:40

## 2017-10-28 ASSESSMENT — ACTIVITIES OF DAILY LIVING (ADL)
TRANSFERRING: 0-->INDEPENDENT
TOILETING: 0-->INDEPENDENT
CHANGE_IN_FUNCTIONAL_STATUS_SINCE_ONSET_OF_CURRENT_ILLNESS/INJURY: YES
BATHING: 0 - INDEPENDENT
AMBULATION: 0 - INDEPENDENT
NUMBER_OF_TIMES_PATIENT_HAS_FALLEN_WITHIN_LAST_SIX_MONTHS: 1
TRANSFERRING: 0 - INDEPENDENT
RETIRED_COMMUNICATION: 2-->DIFFICULTY UNDERSTANDING (NOT RELATED TO LANGUAGE BARRIER)
AMBULATION: 0-->INDEPENDENT
GROOMING: WITH ASSISTANCE
BATHING: 0-->INDEPENDENT
SWALLOWING: 0-->SWALLOWS FOODS/LIQUIDS WITHOUT DIFFICULTY
WHICH_OF_THE_ABOVE_FUNCTIONAL_RISKS_HAD_A_RECENT_ONSET_OR_CHANGE?: COMMUNICATION/SPEECH;COGNITION
DRESS: 0-->INDEPENDENT
FALL_HISTORY_WITHIN_LAST_SIX_MONTHS: YES
COGNITION: 1 - ATTENTION OR MEMORY DEFICITS
TOILETING: 0 - INDEPENDENT
LAUNDRY: WITH SUPERVISION
SWALLOWING: 0 - SWALLOWS FOODS/LIQUIDS WITHOUT DIFFICULTY
RETIRED_EATING: 0-->INDEPENDENT
COMMUNICATION: 2 - DIFFICULTY SPEAKING (NOT RELATED TO LANGUAGE BARRIER)
EATING: 0 - INDEPENDENT
ORAL_HYGIENE: WITH ASSISTANCE
DRESS: WITH ASSISTANCE
DRESS: 0 - INDEPENDENT

## 2017-10-28 ASSESSMENT — ENCOUNTER SYMPTOMS
WEAKNESS: 1
NERVOUS/ANXIOUS: 1
APPETITE CHANGE: 1
UNEXPECTED WEIGHT CHANGE: 1

## 2017-10-28 NOTE — IP AVS SNAPSHOT
[place] [address] [place] [address]    Phone:  [phone]                                       After Visit Summary   10/28/2017    Negro Aj    MRN: [id number]           After Visit Summary Signature Page     I have received my discharge instructions, and my questions have been answered. I have discussed any challenges I see with this plan with the nurse or doctor.    ..........................................................................................................................................  Patient/Patient Representative Signature      ..........................................................................................................................................  Patient Representative Print Name and Relationship to Patient    ..................................................               ................................................  Date                                            Time    ..........................................................................................................................................  Reviewed by Signature/Title    ...................................................              ..............................................  Date                                                            Time

## 2017-10-28 NOTE — CONSULTS
"PSYCHIATRIC CONSULTATION      DATE OF SERVICE:  10/28/2017      REQUESTING PHYSICIAN:  Fili De Anda MD      REASON FOR CONSULTATION:  Depression/anxiety/OCD.      IDENTIFYING DATA:  Negro Aj is a 67-year-old gentleman admitted to the hospital presenting with a mild troponin elevation in the context of a flare-up of his OCD.  He has been highly agitated and anxious, stopped his psychiatric medications and according to the ER records over the last few weeks he has declined to the point where he has been losing weight, stopped responding to questions, became very weak.  He usually takes Prozac and Zyprexa.  He sees Dr. Hurt on an outpatient basis and started yelling \"help\" to his daughter.  They admitted him to the cardiac special care unit for observation on a 72 hr hold.  He was apparently making continual comments about killing himself and his 2 daughters over the last couple of weeks.  He is a very poor historian, extremely agitated.  It looks like they dosed him with some Zyprexa.      On interview this morning, the patient is disheveled.  He is writhing in bed, rubbing the top of his head.  He is almost nonverbal with a delayed speech pattern.  It looks like his thoughts are racing and disjointed.  He talks about having \"trouble with his thinking.\"  There is a notable history of bipolar disorder in mother, sister, along with anxiety and depression.  This is also noted in a sister and I obtained that from records but he was not really able to articulate that.  He denies substance use history.  He is single and I believe he lives with one of his kids.  He cannot tell me why he stopped his medications; he mentions having \"bad thoughts.\"  He has a sitter in his room for observation at this time.  From what I can tell, there is not anything acute going on medically.  His basic labs looked relatively normal, though they are monitoring his cardiac status.      PAST PSYCHIATRIC HISTORY:  I do not have a lot " "of details.  Our records here at [facility] do not show any admissions to this facility, but he was over at [facility] under Dr. Hwang, diagnosed back in  with \"OCD and mood disorder not otherwise specified.\"      When I reviewed the records from [place], it looks like he has been hospitalized at [facility] and he has seen Dr. James on an outpatient basis who used to specialize in OCD.  It looks like he may have been offered ECT at one point.  Has had many, many medication trials including clomipramine, perphenazine and possibly others.  Paxil and Zoloft are mentioned.  It does not look to me like he has ever been treated with mood stabilizers.        PAST PSYCHIATRIC HISTORY:  As above.      PAST CHEMICAL DEPENDENCY HISTORY:  None reported.      PAST MEDICAL HISTORY:  Per chart review includes mildly elevated troponin this admission, hypertension, hyperlipidemia.      PRIOR TO ADMISSION MEDICATIONS LISTED:     1.  Artificial Tears.   2.  Oxycodone 5 mg 1-2 tablets every 3 hours as needed for pain.   3.  Aspirin.   4.  Atarax 25 mg q.6 hours p.r.n. for itching.   5.  Simvastatin.   6.  Prozac 40 mg b.i.d.   7.  Lisinopril/hydrochlorothiazide.     8.  Zyprexa 20 mg daily.   9.  Prilosec.   10.  Ambien 10 mg at night for sleep.      FAMILY HISTORY:  Notable for mood disorder including depression, anxiety, and bipolar disorder in first-degree relatives.      SOCIAL HISTORY:  Per chart review, I believe that he lives in [place] with his daughter, and his wife  several years ago.  I do not believe he is currently working but due to his limited ability to give me history, I could not really obtain a lot of detail.        VITAL SIGNS:  Most recent vital signs, temp 96.3, heart rate 101, respiratory rate 16, blood pressure 156/79, oxygen saturation 97%.      MENTAL STATUS EXAMINATION:  Appearance:  The patient is a slender, disheveled man who is sitting in bed.  He is almost nonverbal " writhing and rubbing the top of his head repeatedly with his hand.  He looks very agitated and uncomfortable.  Speech is halting and almost nonverbal.  He is clearly having difficulty collecting his thoughts.   Use of language is poor.  Motor exam is restless with what appears to be some degree of agitation and withdrawal dyskinesia.  Coordination, station, and gait not tested.  Muscle strength and tone adequate.  Affect is anxious, blunted.  Mood anxious.  Thought process appears disorganized.  He is having a hard time expressing himself and appears preoccupied.   Thought content positive for suicidal and homicidal ideation.  Prior to hospitalization, he was having intrusive thoughts about killing his family and by history has had Orthodox preoccupation.  Insight and judgment poor.  Cognitive exam, the patient is alert, oriented to person and place.  Due to his agitation, it is tough to get a full assessment of this.  Concentration impaired.  Recent memory impaired.  Remote memory grossly intact.  General fund of knowledge average.      IMPRESSION:  The patient is a 67-year-old gentleman with what appears to be a history of severe obsessive-compulsive disorder.  Based on what I am seeing, I think he has a mood disorder with psychotic features.  This may be consistent with schizoaffective disorder or bipolar spectrum illness with a strong obsessive-compulsive disorder component.  Currently, he is severely agitated, so I am going to reinstitute some Zyprexa and start him on some Ativan to calm his anxiety.  I am going to recommend holding off on is Prozac for now, but reinstituting an antidepressant may be appropriate.  I do think we need to consider the possibility he is in a mixed state with the strong family history of possible bipolar disorder, so a mood stabilizer might be appropriate at some point.  I do think he needs to move to Psychiatry when deemed medically stable.  We are going to have an open [facility] bed.       DIAGNOSES:   1.  Obsessive-compulsive disorder.   2.  Rule out mood disorder with psychotic features, possibly bipolar disorder, most recent episode mixed with psychotic features versus schizoaffective disorder bipolar type.   3.  Elevated troponin, currently being evaluated by Cardiology.      PLAN:   1.  Initiate Zyprexa 10 mg at bedtime and utilize Zyprexa Zydis 5 mg q.6 hours p.r.n.   2.  We will initiate oral Ativan 0.5 mg t.i.d.  I did write for an IV dose 0.5 to 1 mg q.4 hours  p.r.n. for acute agitation.   3.  Recommend Psychiatry transfer when deemed medically stable.  I did notify [facility] of his situation and it appears we may have a bed opening up this afternoon.   4.  Maintain sitter and 72 hr hold due to his level of agitation and risk of acting out.         CHRISTOPHER TORREZ MD             D: 10/28/2017 10:45   T: 10/28/2017 11:42   MT: [id number]      Name:     SHANE CUNNINGHAM   MRN:      [id number]        Account:       [id number]   :      1950           Consult Date:  10/28/2017      Document: [id number]       cc: Fili De Anda MD

## 2017-10-28 NOTE — PROGRESS NOTES
Pt signed BECCA for daughter, Nela Aj ( [phone]) , whom pt resides with in [facility]. Daughter Nela stated pt is usually independent regarding medications and she did not know that he had not been taking his meds for a few days. Nela stated his prescriptions are filled at [facility] in [place]; also that pt usually is seen by Dr Hurt outpatient.    Late entry@19:44: Dr Cooper phoned with updated information (obtained by hospital pharmacist who verified when prescriptions last filled at outside pharmacy). Dr Cooper ordered to resume pt's Topamax, clomipramine, and clonazepam. Stop Ativan.

## 2017-10-28 NOTE — H&P
[facility]    History and Physical  Hospitalist       Date of Admission:  10/27/2017  Date of Service (when I saw the patient): 10/28/17    Assessment & Plan   Negro Aj is a 67 year old male who presents with suicidal and homicidal ideation    Elevated troponin  This is mild at 0.055.  He arrived tachycardic with heart rate in the 150s.  He does have some risk factors for coronary disease including hypertension and hyperlipidemia.  Currently not able to get any history from the patient given his psychosis.  Given his underlying psychiatric pathology evaluation would be difficult.  - We'll do serial troponins overnight to ensure that he is not having acute coronary syndrome  - Telemetry  - Echocardiogram in the morning  - If the above is unremarkable and the troponin leak appears to be demand ischemia, it's reasonable to get his psychiatric issues under control and at some point in future have provocative cardiac testing done.  If he does have a ongoing increasing troponins were abnormal echocardiogram we may need to do provocative testing sooner.    Hypertension  We'll continue as prior to admission lisinopril/hydrochlorothiazide at 20 mg/12.5 mg    Hyperlipidemia  Continue his simvastatin at 40 mg daily    Suicidal and homicidal ideation  Depression/anxiety/OCD  At the time of visit the patient is largely nonverbal.  He is also relatively calm at this point.  I will have Zyprexa available as he is on 20 mg per day per notes prior to admission.  - Psychiatry consult in the morning  - If troponins are negative and echo is unremarkable is likely safe to transfer to inpatient psychiatry if this is recommended which I suspect will be    DVT Prophylaxis: Low Risk/Ambulatory with no VTE prophylaxis indicated and Pneumatic Compression Devices  Code Status: Full Code    Disposition: Expected disunclear mother likely be dictated by his cardiac issues    Fili De Anda MD  [phone]  (P)  Text Page     Primary Care Physician   Dr. Florentin Colby    Chief Complaint    suicidal or homicidal ideation    History is obtained from the patient and medical records    History of Present Illness   Negro Aj is a 67 year old male who presents with suicidal and homicidal ideation.  Also has underlying diagnosis of hypertension and hyperlipidemia.  On arrival in the emergency department he had a heart rate in the 150s range, but this improved with IV fluids.  His current heart rates in the 90s to 100 range.  Again I'm unable to get any history from him given his acute psychosis.  Given his tachycardia and EKG and troponin were checked, with EKG showing sinus tachycardia with occasional aberrant conduction and troponin was mildly elevated at 0.055.    Past Medical History    I have reviewed this patient's medical history and updated it with pertinent information if needed.   Past Medical History:   Diagnosis Date     Anxiety      Depression with anxiety      High cholesterol      Hypertension     newly treating in 2016     Knee pain, right      OCD (obsessive compulsive disorder)        Past Surgical History   I have reviewed this patient's surgical history and updated it with pertinent information if needed.  Past Surgical History:   Procedure Laterality Date     ARTHROPLASTY KNEE Right 2/9/2016    Procedure: ARTHROPLASTY KNEE;  Surgeon: Tiera Conteh MD;  Location: [facility] OR     ARTHROSCOPY KNEE Right        Prior to Admission Medications   Prior to Admission Medications   Prescriptions Last Dose Informant Patient Reported? Taking?   FLUoxetine HCl (PROZAC PO)  Self Yes No   Sig: Take 40 mg by mouth 2 times daily Pt takes 40 mg in am, then 3 hours later takes second 40 mg to = 80 mg/day   OLANZapine (ZYPREXA PO)  Self Yes No   Sig: Take 20 mg by mouth daily (takes 2 x 10mg tablet)   Omeprazole (PRILOSEC PO)  Self Yes No   Sig: Take 20 mg by mouth every morning    SIMVASTATIN PO  Self Yes  No   Sig: Take 40 mg by mouth daily   Zolpidem Tartrate (AMBIEN PO)  Self Yes No   Sig: Take 10 mg by mouth nightly as needed for sleep   aspirin  MG EC tablet   No No   Sig: Take 1 tablet (325 mg) by mouth daily Take 1 tab daily x 6 wks   hydrOXYzine (ATARAX) 25 MG tablet   No No   Sig: Take 1 tablet (25 mg) by mouth every 6 hours as needed for itching or other (spasm/pain)   hypromellose-dextran (ARTIFICAL TEARS) SOLN   No No   Sig: Place 1 drop into the right eye 4 times daily as needed for dry eyes   lisinopril-hydrochlorothiazide (PRINZIDE,ZESTORETIC) 20-12.5 MG per tablet  Self Yes No   Sig: Take 0.5 tablets by mouth daily   order for DME   No No   Sig: Equipment being ordered: Walker Wheels () and Walker ()  Treatment Diagnosis: Impaired gait   oxyCODONE (ROXICODONE) 5 MG immediate release tablet   No No   Sig: Take 1-2 tablets (5-10 mg) by mouth every 3 hours as needed for moderate to severe pain Take 1 tab for pain rated 0-5, and 2 tabs for pain rated 5-10.   senna-docusate (SENOKOT-S;PERICOLACE) 8.6-50 MG per tablet   No No   Sig: Take 1-2 tablets by mouth 2 times daily Continue while taking narcotic pain medications. Hold for loose stools.      Facility-Administered Medications: None     Allergies   Allergies   Allergen Reactions     Seasonal Allergies        Social History   I have reviewed this patient's social history and updated it with pertinent information if needed. Negro Aj  reports that he has never smoked. He does not have any smokeless tobacco history on file. He reports that he drinks alcohol. He reports that he does not use illicit drugs.    Family History   I have reviewed this patient's family history and updated it with pertinent information if needed.   Family History   Problem Relation Age of Onset     Depression Mother      Anxiety Disorder Mother      Bipolar Disorder Mother      Bipolar Disorder Sister      Depression Sister      Depression Sister         Review of Systems   The 10 point Review of Syswas unable to be assessed given his acute psychosis    Physical Exam   Temp: 97.6  F (36.4  C) Temp src: Temporal BP: (!) 160/109   Heart Rate: 125 Resp: 18 SpO2: 97 % O2 Device: None (Room air)    Vital Signs with Ranges  162 lbs 0 oz    Constitutional: alert, unable to assess further orientation, etc  Eyes: EOMI, PERRL  HEENT: OP clear  Respiratory: Clear but with limited effort   Cardiovascular: RRR without m/r/g  GI: soft, does not appear to be tender   Lymph/Hematologic: no cervical LAD  Genitourinary: deferred  Skin: no rashes or lesions grossly  Musculoskeletal: no deformities or arthritis  Neurology: Able to assess   Psychiatric: He has a flat affect.  I'm unable to obtain any history     Data   Data reviewed today:  I personally reviewed the EKG tracing showing Sinus tachycardia with no evidence of ischemia.    Recent Labs  Lab 10/28/17  0020   WBC 9.9   HGB 14.2   MCV 91         POTASSIUM 3.3*   CHLORIDE 106   CO2 25   BUN 24   CR 0.94   ANIONGAP 9   MELBA 8.7   *   ALBUMIN 3.7   PROTTOTAL 7.5   BILITOTAL 0.5   ALKPHOS 86   ALT 50   AST 33   TROPI 0.055*       No results found for this or any previous visit (from the past 24 hour(s)).

## 2017-10-28 NOTE — PROGRESS NOTES
"Pt arrived from [facility], hospitalist is following Pt. Difficult to converse with Pt, offers answers about 20% of the time. Claims he is here to get \"psych help.\" Daughter able to provide some background information. She lives with Pt along with her sister and sister's family. Daughter suspects Pt has not been taking his medication. Pt agrees, but cannot recall how long he has gone without his medications. Pt admits to depression, anxiety, suicidal ideation & homicidal ideation towards adult daughter. Contracts for safety. Knee replacement surgery a few years ago. Able to ambulate to bathroom, eating & drinking with encouragement. 72 hour hold paperwork arrived. Rights read to Pt. Pt has a copy.     Nursing assessment complete including patient and medication profiles. Risk assessments completed addressing suicide,fall,skin,nutrition and safety issues. Care plan initiated. Assessments reviewed with physician and admit orders received.     Welcome packet reviewed with patient. Information reviewed includes getting emergency help, preventing infections, understanding your care, using medication safely, reducing falls, preventing pressure ulcers, smoking cessation, powerful choices and Patients Bill of Rights. Pt. given tour of the unit and instruction on use of facility including emergency call light. Program schedule reviewed with patient. Questions regarding the unit addressed. Pt. Search completed and belongings inventoried.       "

## 2017-10-28 NOTE — PROGRESS NOTES
A/o, confusion & word find difficultly at times. VS: HR tachy. Tele: SR w/PACs. Hx: OCD, suicide ideation. Pt on 72 hour hold. Arrived on unit ~130, agitation decreased once settled in room.  Daughter was present for admit, then went home. Trop's elevated. K 3.3, K-dur given 60meq total, redraw scheduled 0915. Reg diet.  Cont to monitor.

## 2017-10-28 NOTE — PLAN OF CARE
Problem: Patient Care Overview  Goal: Plan of Care/Patient Progress Review  Outcome: Adequate for Discharge Date Met:  10/28/17  Patients d/c'ed to station 77. Up with SBA. Would not answer questions. Unable to review AVS with patient. All belongings sent with patient.

## 2017-10-28 NOTE — PLAN OF CARE
Problem: Patient Care Overview  Goal: Plan of Care/Patient Progress Review  Outcome: No Change  Alert.  Providing minimal communciation.  Does not answer when asked if having pain.  LS clear.  Denies SOB.  Refu breakfast.  Unable to report bowel pattern.  Incontinent of urine x2.  Carline care provided.  Ambulates SBA to BR.  Generalized weakness.  Needs continual direction with activity when OOB and assistance to get OOB.  Tele ST with PAC's.  BP elevated.  Will reassess.  Calm behavior but agitated.  Continually moving legs and fidgeting with LUE rubbing head.  Refuses meds for anxiety/agitation.  POC reviewed with pt.  Allowed time to communicate.  Emotional support provided.  Will tx to inpt mental health when medically cleared.

## 2017-10-28 NOTE — PROGRESS NOTES
A/O to person, birthdate, place.  Confused on time.  Slow to respond to questions.  Not answering all questions.  Unable to answer if he is having pain or SOB.  Is having suicidal ideation and also states thoughts of hurting his daughter are still present.  Holding and rubbing head with hand, legs are fidgeting.  Allowed to verbalize thoughts.  Emotional support provided.      Pt is calm but as RN is attempting to assess pt, is becoming slightly agitated.  /118, .  Pt refusing AM meds at this time but will try again.    Pt care attendant at bedside.  Cont to monitor.

## 2017-10-28 NOTE — PHARMACY-ADMISSION MEDICATION HISTORY
Admission medication history interview status for the 10/28/2017  admission is complete. See EPIC admission navigator for prior to admission medications     Medication history source reliability:Poor, pt is not a good historian or able to successfully be interviewed at this time per RN    Actions taken by pharmacist (provider contacted, etc):Called [facility] for recent fill     Additional medication history information not noted on PTA med list :Patient was on medical floor before discharge and admit to Mental Health.  The following are new meds and not home meds: Lorazepam, Lisinopril, Olanzapine, Metoprolol Tartrate.  The following medications were discontinued on transfer to Mental health: Zolpidem 10mg qhs, Lisinopril/HCTZ 20/12.5 1 tablet daily, Fluoxetine.  The following medications were added to the med list after MD review: Topiramate, Clomipramine, Clonazepam    Need to review med list with patient when able to review OTC and Rx meds.    Medication reconciliation/reorder completed by provider prior to medication history? Yes    Time spent in this activity: 30 minutes    Prior to Admission medications    Medication Sig Last Dose Taking? Auth Provider   LORazepam (ATIVAN) 0.5 MG tablet Take 1 tablet (0.5 mg) by mouth 3 times daily 10/28/2017 at 1300 Yes Wong Alvarado MD   lisinopril (PRINIVIL/ZESTRIL) 10 MG tablet Take 1 tablet (10 mg) by mouth daily 10/28/2017 at Unknown time Yes Wong Alvarado MD   FLUoxetine HCl (PROZAC PO) Take 40 mg by mouth 2 times daily Pt takes 40 mg in am, then 3 hours later takes second 40 mg to = 80 mg/day HOLD Yes Unknown, Entered By History   TOPIRAMATE PO Take 150 mg by mouth At Bedtime  Yes Unknown, Entered By History   CLOMIPRAMINE HCL PO Take 50 mg by mouth At Bedtime  Yes Unknown, Entered By History   CLONAZEPAM PO Take 0.5 mg by mouth nightly as needed  Yes Unknown, Entered By History   SIMVASTATIN PO Take 40 mg by mouth daily  Yes Reported, Patient   Omeprazole  (PRILOSEC PO) Take 20 mg by mouth every morning   Yes Reported, Patient   aspirin EC 81 MG EC tablet Take 1 tablet (81 mg) by mouth daily Unknown at Unknown time  Wong Alvarado MD   OLANZapine (ZYPREXA) 10 MG tablet Take 1 tablet (10 mg) by mouth At Bedtime (takes 2 x 10mg tablet)   Wong Alvarado MD   OLANZapine zydis (ZYPREXA) 5 MG ODT tab Take 1 tablet (5 mg) by mouth every 6 hours as needed for agitation   Wong Alvarado MD   metoprolol (LOPRESSOR) 25 MG tablet Take 1 tablet (25 mg) by mouth 2 times daily   Wong Alvarado MD   hypromellose-dextran (ARTIFICAL TEARS) SOLN Place 1 drop into the right eye 4 times daily as needed for dry eyes Unknown at Unknown time  Kenyon Leong, DO

## 2017-10-28 NOTE — ED PROVIDER NOTES
"  History     Chief Complaint:  Homicidal and Suicidal Thoughts    HPI   Negro Aj is a 67 year old male with a history of OCD who presents to the emergency department accompanied by his daughter for evaluation of homicidal and suicidal thoughts. The patient reports having continual thoughts of killing himself and his two daughters for the past few weeks. He says that he stopped taking all of his medications a few days ago, which included Prozac and Zyprexa. The patient's daughter reports that his physical condition has declined in the past week. She says that he has lost weight, stopped communicating or responding to questions, and seems weaker. He has presented here to the emergency department today after he yelled \"help\" to his daughter; she then asked him if he would like to go to the hospital and he was willing to go.    Allergies:  Seasonal allergies     Medications:    Artificial tears  Roxicodone  Aspirin  Atarax  Senna-docusate  Simvastatin  Prozac  Prinzide,Zestoretic  Zyprexa  Prilosec  Ambien     Past Medical History:    Anxiety   Depression with anxiety   High cholesterol   Hypertension   Knee pain, right   OCD (obsessive compulsive disorder)    Past Surgical History:    Arthroplasty knee  Arthroscopy knee    Family History:    Depression: Mother  Anxiety Disorder: Mother  Bipolar Disorder: Mother  Bipolar Disorder: Sister  Depression: Sister    Social History:  The patient is accompanied by his daughter.  Smoking Status: Never Smoker  Alcohol Use: Occasional  Marital Status:  Single      Review of Systems   Constitutional: Positive for appetite change (decreased) and unexpected weight change (decreased).   Neurological: Positive for weakness.   Psychiatric/Behavioral: Positive for suicidal ideas. The patient is nervous/anxious.    All other systems reviewed and are negative.    Physical Exam     Patient Vitals for the past 24 hrs:   BP Temp Temp src Heart Rate Resp SpO2 Weight   10/28/17 " 0236 147/73 97.7  F (36.5  C) Oral 96 18 97 % -   10/28/17 0200 (!) 160/109 - - - - 97 % -   10/28/17 0145 - - - - - 95 % -   10/28/17 0130 (!) 179/127 - - - - 96 % -   10/28/17 0126 (!) 179/110 - - 125 - 97 % -   10/27/17 2331 - - - - - 96 % -   10/27/17 2311 (!) 158/111 97.6  F (36.4  C) Temporal 155 18 97 % 73.5 kg (162 lb)     Physical Exam  Eye:  Pupils are equal, round, and reactive.  Extraocular movements intact.     ENT:  No rhinorrhea.  Moist mucus membranes.  Normal tongue and tonsil.    Cardiac:  Markedly tachycardic with frequent ectopy.  No murmurs, gallops, or rubs.    Pulmonary:  Clear to auscultation bilaterally.  No wheezes, rales, or rhonchi.    Abdomen:  Positive bowel sounds.  Abdomen is soft and non-distended, without focal tenderness.    Musculoskeletal:  Normal movement of all extremities without evidence for deficit.    Skin:  Warm and dry without rashes.    Neurologic:  Non-focal exam without asymmetric weakness or numbness.     Psychiatric:  Patient somewhat agitated, though also shows psychomotor retardation as he tries to speak.  Otherwise alert and cooperative.    Emergency Department Course     ECG:  ECG taken at 0056, ECG read at 0100  Sinus tachycardia with premature atrial complexes with aberrant conduction  Left axis deviation  Abnormal ECG  Rate 114 bpm. MA interval 120 ms. QRS duration 80 ms. QT/QTc 346/476 ms. P-R-T axes 35 -39 6.    Laboratory:  Laboratory findings were communicated with the patient and his daughter who voiced understanding of the findings.    CBC: WBC 9.9, HGB 14.2,   CMP: Potassium 3.3 (L), Glucose 145 (H) o/w WNL (Creatinine 0.94)  Ethanol level: <0.01  TSH: 1.07  Troponin (Collected 0020): 0.055 (H)     Interventions:  0040 NS 1000 mLs IV  0129 NS 1000 mLs IV  0126 Zyprexa 10 mg PO    Emergency Department Course:    Nursing notes and vitals reviewed.    I performed an exam of the patient as documented above.     IV was inserted and blood was drawn for  laboratory testing, results above.     0130 I discussed the treatment plan with the patient. They expressed understanding of this plan and consented to admission. I discussed the patient with Dr. Fili De Anda, who will admit the patient to a monitored bed for further evaluation and treatment.    I personally reviewed the  results with the laboratory and EKG results and answered all related questions prior to admission.    Impression & Plan      Medical Decision Making:  Negro Aj is a 67 year old male who presents to the emergency department today for evaluation of decompensating mental state resulting in agitation, inability to care for self, and homicidal thoughts.  The patient has severe obsessive-compulsive disorder.  Family feels he has had worsening of his baseline symptoms over the last month, and he stopped taking his medications one week ago with substantial worsening.  He has had very little oral intake.  His ability to speak and cope with general life have decompensated.  He admitted to his daughter that he is having thoughts of killing himself and killing his daughters.  With this, they brought him to the emergency department for acute treatment.    On exam, he appears somewhat agitated, having a difficult time getting his words out.  He was markedly tachycardic with some ectopy.  He will certainly require admission for psychiatric purposes, but I felt a thorough medical evaluation was prudent.  Considering his age, tachycardia, and ectopy, and EKG was obtained which does not show evidence of dysrhythmia.  However, he does have an elevated troponin of unclear significance, likely secondary to demand ischemia.  His TSH is normal.  His drug screen is otherwise negative.    He was given a dose of Zyprexa which helped with his agitation.  I spoke with Dr. De Anda of the hopsitalist service who will admit the patient to the [facility] for a formal rule out and further workup of his positive troponin.  I  placed him on a 72 hour hold.  He will require psychiatric admission once he has been medically cleared by the inpatient team.  Daughter's questions were answered and she was pleased with the plan for admission.  The patient is cooperative and agrees that admission is prudent.    Diagnosis:    ICD-10-CM    1. Homicidal ideation R45.850    2. Obsessive-compulsive disorder, unspecified type F42.9    3. Elevated troponin R74.8      Disposition:   The patient was admitted into the care of Dr. Fili De Anda for further evaluation and management.    Scribe Disclosure:  I, Celine Butts, am serving as a scribe at 11:28 PM on 10/27/2017 to document services personally performed by Trierweiler, Chad A, MD, based on my observations and the provider's statements to me.     EMERGENCY DEPARTMENT     Trierweiler, Chad A, MD  10/28/17 0320

## 2017-10-28 NOTE — PROGRESS NOTES
"Ativan 0.5 mg prn IV admin at 1258 due to continued agitation and elevated BP/HR.   Pt now able to converse, is eating, and was able to use urinal.  Pt states \"I was having thoughts, some good/some bad, but more bad, and they could start again anytime\".  Pt continues to rest and eat in bed.  Cont to monitor.  Daughter called and update given.   "

## 2017-10-28 NOTE — UTILIZATION REVIEW
"Admission Status; Secondary Review Determination     Under the authority of the Utilization Management Committee, the utilization review process indicated a secondary review on the above patient.  The review outcome is based on review of the medical records, discussions with staff, and applying clinical experience noted on the date of the review.       (x) Observation Status Appropriate - This patient does not meet hospital inpatient criteria and is placed in observation status. If this patient's primary payer is Medicare and was admitted as an inpatient, Condition Code 44 should be used and patient status changed to \"observation\".     RATIONALE FOR DETERMINATION: 67 year old male with a history of OCD who presents to the emergency department accompanied by his daughter for evaluation of homicidal and suicidal thoughts. The patient's daughter reports that his physical condition has declined in the past week. She says that he has lost weight, stopped communicating or responding to questions, and seems weaker. ER  exam found patient to be agitated, having a difficult time getting his words out and markedly tachycardic. This is associated with minimally elevated troponin level. He thus needs serial trop and ECHO to r/o ACS prior to transfer to mental health unit. Observation care appropriate to assess patient prior to mental health admission. If medical issues arise during observation care that require medical management, Then patient would be appropriate for inpatient care along with psychiatric management.        The severity of illness, intensity of service provided, expected LOS and risk for adverse outcome make the care appropriate for further observation; however, doesn't meet criteria for hospital inpatient admission. This was discussed with attending physician who concurred with this determination.    The information on this document is developed by the utilization review team in order for the business office " to ensure compliance.  This only denotes the appropriateness of proper admission status and does not reflect the quality of care rendered.         The definitions of Inpatient Status and Observation Status used in making the determination above are those provided in the CMS Coverage Manual, Chapter 1 and Chapter 6, section 70.4.      Sincerely,     Rudolph Dawkins MD    Physician Advisor  Utilization Review/ Case Management  [facility].

## 2017-10-28 NOTE — PROGRESS NOTES
10/28/17 1640   Patient Belongings   Did you bring any home meds/supplements to the hospital?  No   Disposition of Belongings Other (see comment)   Belongings Search Yes   Clothing Search Yes   Second Staff Lui     Glasses  Phone  Wallet  Comb  Windbreaker  sweat pants with strings   Sweater   Athletic shorts  t-shirt  Socks  Shoes with laces  Loose change  [place] 's license  miscellaneous receipts and business cards  Health insurance cards  Rewards cards  $5 cash  boxers     Security Envelope #[id number]  Visa [id number]  Visa [id number]  [employer] [phone]  Discover [id number]      Admission:  I am responsible for any personal items that are not sent to the safe or pharmacy. [facility] is not responsible for loss, theft or damage of any property in my possession.        Patient Signature: ___________________________________________      Date/Time:__________________________     Staff Signature: __________________________________      Date/Time:__________________________     [facility] Staff person, if patient is unable/unwilling to sign:      __________________________________________________________      Date/Time: __________________________        Discharge:  [facility] has returned all of my personal belongings:     Patient Signature: ________________________________________     Date/Time: ____________________________________     Staff Signature: ______________________________________     Date/Time:_____________________________________

## 2017-10-28 NOTE — DISCHARGE SUMMARY
[facility]    Discharge Summary  Hospitalist    Date of Admission:  10/27/2017  Date of Discharge:  10/28/2017  Discharging Provider: Wong Alvarado  Date of Service (when I saw the patient): 10/28/17    Discharge Diagnoses   Elevated troponin  Sinus tachycardia  Moderate left ventricular hypertrophy  Mild mitral regurgitation   Possible atrial fibrillation   Hypertension   Hyperlipidemia  Suicidal and homicidal ideation  Depression  Anxiety  OCD    History of Present Illness   Negro Aj is an 67 year old male who presented with suicidal and homicidal ideation, incidentally noted to have elevated troponin.     Hospital Course   Negro Aj was admitted on 10/27/2017.  The following problems were addressed during his hospitalization:    Elevated troponin  Sinus tachycardia  Moderate left ventricular hypertrophy  Mild mitral regurgitation   Possible atrial fibrillation   Troponin checked in ED due to tachycardia and some ectopy noted, mildly elevated at 0.055. Overall plateau in trend 0.087->0.090. No ischemic ST-T wave changes on EKG. Echocardiogram with moderate LVH, normal EF, no focal wall motion abnormalities. No known history of coronary artery disease, but risk factors include age, male, hypertension, hyperlipidemia. Suspect troponin elevation secondary to significant tachycardia in setting of LVH. Given his risk factors, may benefit from provocative cardiac testing to rule out underlying coronary artery disease, however this should be completed after his psychiatric status has been stabilized as he would be a poor candidate for invasive assessment and/or intervention until this has occurred. Recommend follow-up with his primary care provider after discharge to facilitate this. In the meantime, have optimized his medical management in the event he has underlying coronary artery disease by continuing his prior to admission lisinopril, added aspirin 81 mg daily and  changed his HCTZ to metoprolol 25 mg BID. Continued on his prior to admission statin. Note his echocardiogram report indicated a rhythm of SVT. His telemetry had captured mostly sinus tachycardia with PACs, although with one short run of possible atrial fibrillation. He was in sinus rhythm at time of discharge to psychiatry unit. Poor candidate for anticoagulation until psychiatric issues addressed. Continued on metoprolol as above.     Hypertension   Blood pressure elevated in setting of significant agitation with suicidal and homicidal ideation. HCTZ changed to metoprolol as above.      Hyperlipidemia  Continue prior to admission simvastatin 40 mg daily.     Suicidal and homicidal ideation  Depression  Anxiety  OCD  Presented with suicidal and homicidal ideation with decompensated functional status at home. Psychiatry consulted. Olanzapine 10 mg at bedtime added as well as lorazepam TID, and olanzapine PRN. These were continued on discharge to psychiatry unit. Further management per his psychiatry providers on inpatient mental health unit.     Hospitalists will follow-up on blood pressure and cardiac symptoms 10/29/17 after admission to psychiatric unit. No need to re-consult.    Wong Alvarado    Significant Results and Procedures   Echocardiogram 10/28/2017   The rhythm was supraventricular tachycardia.  Left ventricular systolic function is normal.  The visual ejection fraction is estimated at 60-65%.  There is moderate concentric left ventricular hypertrophy.  The left ventricle is normal in size.  The right ventricle is normal in structure, function and size.  The left atrium is moderately dilated.  There is mild (1+) mitral regurgitation.    Code Status   Full Code       Primary Care Physician   Florentin Colby    Physical Exam   Temp: 98.8  F (37.1  C) Temp src: Oral BP: 126/84   Heart Rate: 114 Resp: 16 SpO2: 97 % O2 Device: None (Room air)    Vitals:    10/27/17 2311 10/28/17 0546   Weight: 73.5 kg (162  lb) 74.8 kg (164 lb 14.5 oz)     Vital Signs with Ranges  Temp:  [96.3  F (35.7  C)-98.8  F (37.1  C)] 98.8  F (37.1  C)  Heart Rate:  [] 114  Resp:  [16-18] 16  BP: (126-179)/() 126/84  SpO2:  [95 %-97 %] 97 %  I/O last 3 completed shifts:  In: 330 [P.O.:330]  Out: 325 [Urine:325]    Constitutional: Middle aged male, NAD  Respiratory: Clear to auscultation bilaterally, good air movement bilaterally  Cardiovascular: Regular rhythm with slightly tachycardic rate, no m/r/g. No peripheral edema.  GI: Soft, non-tender, non-distended.  Skin/Integumen: Warm, dry  Other:     Discharge Disposition   Transferred to inpatient psychiatry   Condition at discharge: Stable    Consultations This Hospital Stay   PSYCHIATRY IP CONSULT  PSYCHIATRY IP CONSULT    Time Spent on this Encounter   IWong, personally saw the patient today and spent greater than 30 minutes discharging this patient.    Discharge Orders     Activity - Up ad mercedes     Follow Up and recommended labs and tests   Follow-up with psychiatry on admission to mental health unit. Follow-up with primary care provider within 1 week of discharge from mental health unit, to discuss possible stress testing.     Full Code     Advance Diet as Tolerated   Follow this diet upon discharge: Regular diet       Discharge Medications   Current Discharge Medication List      START taking these medications    Details   LORazepam (ATIVAN) 0.5 MG tablet Take 1 tablet (0.5 mg) by mouth 3 times daily  Qty: 60 tablet    Associated Diagnoses: Obsessive-compulsive disorder, unspecified type      lisinopril (PRINIVIL/ZESTRIL) 10 MG tablet Take 1 tablet (10 mg) by mouth daily  Qty: 30 tablet    Associated Diagnoses: Benign essential hypertension      OLANZapine zydis (ZYPREXA) 5 MG ODT tab Take 1 tablet (5 mg) by mouth every 6 hours as needed for agitation    Associated Diagnoses: Obsessive-compulsive disorder, unspecified type      metoprolol (LOPRESSOR) 25 MG tablet Take  1 tablet (25 mg) by mouth 2 times daily  Qty: 60 tablet    Associated Diagnoses: Benign essential hypertension         CONTINUE these medications which have CHANGED    Details   aspirin EC 81 MG EC tablet Take 1 tablet (81 mg) by mouth daily    Associated Diagnoses: Primary localized osteoarthritis of right knee      OLANZapine (ZYPREXA) 10 MG tablet Take 1 tablet (10 mg) by mouth At Bedtime (takes 2 x 10mg tablet)    Associated Diagnoses: Obsessive-compulsive disorder, unspecified type         CONTINUE these medications which have NOT CHANGED    Details   hypromellose-dextran (ARTIFICAL TEARS) SOLN Place 1 drop into the right eye 4 times daily as needed for dry eyes  Qty: 1 Bottle, Refills: 1      order for DME Equipment being ordered: Walker Wheels () and Walker ()  Treatment Diagnosis: Impaired gait  Qty: 1 each, Refills: 0    Associated Diagnoses: Status post total right knee replacement      SIMVASTATIN PO Take 40 mg by mouth daily      Omeprazole (PRILOSEC PO) Take 20 mg by mouth every morning          STOP taking these medications       oxyCODONE (ROXICODONE) 5 MG immediate release tablet Comments:   Reason for Stopping:         hydrOXYzine (ATARAX) 25 MG tablet Comments:   Reason for Stopping:         senna-docusate (SENOKOT-S;PERICOLACE) 8.6-50 MG per tablet Comments:   Reason for Stopping:         FLUoxetine HCl (PROZAC PO) Comments:   Reason for Stopping:         lisinopril-hydrochlorothiazide (PRINZIDE,ZESTORETIC) 20-12.5 MG per tablet Comments:   Reason for Stopping:         Zolpidem Tartrate (AMBIEN PO) Comments:   Reason for Stopping:             Allergies   Allergies   Allergen Reactions     Seasonal Allergies      Data   Most Recent 3 CBC's:  Recent Labs   Lab Test  10/28/17   0020  02/11/16   0704  02/10/16   0641  02/09/16   1156  02/04/13   0039   WBC  9.9   --    --   5.0  6.5   HGB  14.2  10.3*  10.9*  12.9*  14.6   MCV  91   --    --   94  94   PLT  237   --    --   172  185       Most Recent 3 BMP's:  Recent Labs   Lab Test  10/28/17   0550  10/28/17   0020  02/09/16   1156  02/04/13   0039   NA   --   140  142  144   POTASSIUM  4.0  3.3*  3.9  3.8   CHLORIDE   --   106  108  104   CO2   --   25  27  28   BUN   --   24  17  19   CR   --   0.94  0.82  0.80   ANIONGAP   --   9  7  12   MELBA   --   8.7  8.4*  9.3   GLC   --   145*  105*  114*     Most Recent 2 LFT's:  Recent Labs   Lab Test  10/28/17   0020  02/04/13   0039   AST  33  40   ALT  50  43   ALKPHOS  86  85   BILITOTAL  0.5  0.5     Most Recent INR's and Anticoagulation Dosing History:  Anticoagulation Dose History     Recent Dosing and Labs Latest Ref Rng & Units 2/9/2016    INR 0.86 - 1.14 0.92        Most Recent 3 Troponin's:  Recent Labs   Lab Test  10/28/17   1222  10/28/17   0550  10/28/17   0020   TROPI  0.090*  0.087*  0.055*     Most Recent Cholesterol Panel:No lab results found.  Most Recent 6 Bacteria Isolates From Any Culture (See EPIC Reports for Culture Details):No lab results found.  Most Recent TSH, T4 and A1c Labs:  Recent Labs   Lab Test  10/28/17   0020   TSH  1.07     Results for orders placed or performed during the hospital encounter of 02/03/13   MRI Brain w/o contrast    Narrative       MRI BRAIN W-O CONTRAST  2/15/2013 7:20 PM     HISTORY:  severe ocd,Anomia on neuropsych testing.   Eval for focal  lesions specifically vascular.,     TECHNIQUE: Multiplanar, multisequence MRI of the brain without  gadolinium IV contrast material.     COMPARISON: None.     FINDINGS:  The ventricles and sulci are normal in size. There are a  few nonspecific white matter hyperintensities. These are commonly  seen in this age group and are most consistent with small vessel  ischemic change..  There is no evidence of hemorrhage, mass, acute  infarct, or anomaly. The facial structures appear normal.  The  arteries at the base of the brain and the dural venous sinuses appear  patent.     IMPRESSION  1. No acute pathology. No bleed,  mass, or acute infarcts are  identified.  2. Nonspecific white matter hyperintensities. In this age group, they  are probably due to small vessel ischemic change and part of the  normal aging process. Hypertensive patients or diabetic patients or  more likely to have these types of changes.     KAL BURLESON MD

## 2017-10-28 NOTE — PROVIDER NOTIFICATION
FYI  Trop 0.087, previous 0.055.  /118, recheck 156/79.  -120's.    Did take BP meds; refusing other meds.  Agitated but refuses zyprexa prn.  Thanks    Paged to Dr. Alvarado

## 2017-10-29 PROCEDURE — 99232 SBSQ HOSP IP/OBS MODERATE 35: CPT | Performed by: INTERNAL MEDICINE

## 2017-10-29 PROCEDURE — 12400006 ZZH R&B MH INTERMEDIATE

## 2017-10-29 PROCEDURE — A9270 NON-COVERED ITEM OR SERVICE: HCPCS | Mod: GY | Performed by: PSYCHIATRY & NEUROLOGY

## 2017-10-29 PROCEDURE — 25000132 ZZH RX MED GY IP 250 OP 250 PS 637: Mod: GY | Performed by: PSYCHIATRY & NEUROLOGY

## 2017-10-29 RX ORDER — LORAZEPAM 1 MG/1
1 TABLET ORAL
Status: DISCONTINUED | OUTPATIENT
Start: 2017-10-29 | End: 2017-11-03

## 2017-10-29 RX ADMIN — LORAZEPAM 1 MG: 1 TABLET ORAL at 20:22

## 2017-10-29 RX ADMIN — TOPIRAMATE 150 MG: 100 TABLET, FILM COATED ORAL at 20:33

## 2017-10-29 RX ADMIN — METOPROLOL TARTRATE 25 MG: 25 TABLET ORAL at 09:16

## 2017-10-29 RX ADMIN — CLOMIPRAMINE HYDROCHLORIDE 50 MG: 50 CAPSULE ORAL at 20:32

## 2017-10-29 RX ADMIN — Medication 0.25 MG: at 09:16

## 2017-10-29 RX ADMIN — LISINOPRIL 10 MG: 10 TABLET ORAL at 09:15

## 2017-10-29 RX ADMIN — LORAZEPAM 1 MG: 1 TABLET ORAL at 15:09

## 2017-10-29 RX ADMIN — OLANZAPINE 5 MG: 5 TABLET, ORALLY DISINTEGRATING ORAL at 10:38

## 2017-10-29 RX ADMIN — LORAZEPAM 1 MG: 1 TABLET ORAL at 12:18

## 2017-10-29 RX ADMIN — SIMVASTATIN 40 MG: 40 TABLET, FILM COATED ORAL at 20:32

## 2017-10-29 RX ADMIN — ASPIRIN 81 MG: 81 TABLET, COATED ORAL at 09:16

## 2017-10-29 RX ADMIN — OMEPRAZOLE 20 MG: 20 CAPSULE, DELAYED RELEASE ORAL at 09:15

## 2017-10-29 RX ADMIN — OLANZAPINE 10 MG: 10 TABLET, FILM COATED ORAL at 20:33

## 2017-10-29 RX ADMIN — METOPROLOL TARTRATE 25 MG: 25 TABLET ORAL at 20:32

## 2017-10-29 ASSESSMENT — ACTIVITIES OF DAILY LIVING (ADL)
ORAL_HYGIENE: WITH ASSISTANCE
LAUNDRY: WITH SUPERVISION
DRESS: WITH ASSISTANCE
GROOMING: WITH ASSISTANCE
LAUNDRY: WITH SUPERVISION
DRESS: WITH ASSISTANCE
ORAL_HYGIENE: WITH ASSISTANCE
GROOMING: WITH ASSISTANCE

## 2017-10-29 NOTE — PROGRESS NOTES
[facility]    Hospitalist Progress Note    Date of Service (when I saw the patient): 10/29/2017    Assessment & Plan   Negro Aj is a 67 year old male who presented with suicidal and homicidal ideation, initially admitted to medicine service 10/28/2017 due to elevated troponin, subsequently transferred to psychiatry for ongoing cares.     Elevated troponin  Sinus tachycardia  Moderate left ventricular hypertrophy  Mild mitral regurgitation   Possible atrial fibrillation   Troponin checked in ED due to tachycardia and some ectopy noted, mildly elevated at 0.055. Overall plateau in trend 0.087->0.090. No ischemic ST-T wave changes on EKG. Echocardiogram with moderate LVH, normal EF, no focal wall motion abnormalities. No known history of coronary artery disease, but risk factors include age, male, hypertension, hyperlipidemia. Suspect troponin elevation secondary to significant tachycardia in setting of LVH. Given his risk factors, may benefit from provocative cardiac testing to rule out underlying coronary artery disease, however this should be completed after his psychiatric status has been stabilized as he would be a poor candidate for invasive assessment and/or intervention until this has occurred. In the meantime, have optimized his medical management in the event he has underlying coronary artery disease. Note his echocardiogram report indicated a rhythm of SVT. His telemetry had captured mostly sinus tachycardia with PACs, although with one short run of possible atrial fibrillation. He was in sinus rhythm at time of discharge to psychiatry unit.  - Reviewed telemetry strip of questionable atrial fibrillation, mostly regular with p-waves present suggestive of sinus tachycardia, with a few irregular beats that are difficult to interpret due to poor baseline. No definite evidence of atrial fibrillation. Can repeat EKG if rhythm is irregular on exam, would not recommend  anticoagulation unless atrial fibrillation definitively confirmed, and even then would need his psychiatric disease stabilized to be an appropriate candidate  - Heart regular and normal rate on exam 10/29/2017   - Continue metoprolol 25 mg BID, lisinopril 10 mg, aspirin 81 mg   - Follow-up with primary care provider following discharge from mental health for discussion of cardiac stress test for risk stratification once his psychiatric issues have stabilized      Hypertension   Blood pressure elevated in setting of significant agitation with suicidal and homicidal ideation. HCTZ discontinued in favor of metoprolol BID  - Continue lisinopril 10 mg, metoprolol 25 mg BID  - Blood pressure adequately controlled     Hyperlipidemia  Continue prior to admission simvastatin 40 mg daily.      Suicidal and homicidal ideation  Depression  Anxiety  OCD  Presented with suicidal and homicidal ideation with decompensated functional status at home. Psychiatry consulted, subsequently transferred to inpatient psychiatry unit.   - Further management per psychiatry service    DVT Prophylaxis: Low Risk/Ambulatory with no VTE prophylaxis indicated  Code Status: Full Code    Disposition: Expected discharge per psychiatry service. Medical issues currently stable. Hospitalists will sign-off. Please re-consult if HR control becomes a repeat issue or if rhythm becomes irregular.     Wong Alvarado    Interval History   Patient provides minimal history and is mostly non-verbal despite repeat attempts.     -Data reviewed today: I reviewed all new labs and imaging results over the last 24 hours. I personally reviewed no images or EKG's today.    Physical Exam   Temp: 98.1  F (36.7  C) Temp src: Oral BP: (!) 156/104 Pulse: 76   Resp: 16 SpO2: 93 % O2 Device: None (Room air)    Vitals:    10/28/17 1647   Weight: 74.1 kg (163 lb 4.8 oz)     Vital Signs with Ranges  Temp:  [98  F (36.7  C)-98.8  F (37.1  C)] 98.1  F (36.7  C)  Pulse:  []  76  Heart Rate:  [114-118] 114  Resp:  [16-28] 16  BP: (126-163)/() 156/104  SpO2:  [93 %-97 %] 93 %       Constitutional: Well-appearing, NAD  Respiratory: Clear to auscultation bilaterally, good air movement bilaterally  Cardiovascular: RRR, no m/r/g. No peripheral edema.  GI: Soft, non-tender, non-distended. BS normoactive.   Skin/Integumen: Warm, dry  Neuro/Psych: Mostly non-verbal. Following commands.      Medications        aspirin EC  81 mg Oral Daily     lisinopril  10 mg Oral Daily     metoprolol  25 mg Oral BID     OLANZapine  10 mg Oral At Bedtime     omeprazole (priLOSEC) CR capsule 20 mg  20 mg Oral QAM AC     clomiPRAMINE (ANAFRANIL) capsule 50 mg  50 mg Oral At Bedtime     topiramate (TOPAMAX) tablet 150 mg  150 mg Oral At Bedtime     clonazePAM  0.25 mg Oral QAM     simvastatin  40 mg Oral Daily       Data     Recent Labs  Lab 10/28/17  1222 10/28/17  0550 10/28/17  0020   WBC  --   --  9.9   HGB  --   --  14.2   MCV  --   --  91   PLT  --   --  237   NA  --   --  140   POTASSIUM  --  4.0 3.3*   CHLORIDE  --   --  106   CO2  --   --  25   BUN  --   --  24   CR  --   --  0.94   ANIONGAP  --   --  9   MELBA  --   --  8.7   GLC  --   --  145*   ALBUMIN  --   --  3.7   PROTTOTAL  --   --  7.5   BILITOTAL  --   --  0.5   ALKPHOS  --   --  86   ALT  --   --  50   AST  --   --  33   TROPI 0.090* 0.087* 0.055*       No results found for this or any previous visit (from the past 24 hour(s)).

## 2017-10-29 NOTE — H&P
"DATE AND TIME OF SERVICE:  10/29/2017 at 10:00 a.m.      CHIEF COMPLAINT:  Depression, psychosis.      HISTORY OF PRESENT ILLNESS:  Mr. Negro Aj is a 67-year-old  gentleman who lives with his daughter in [place], [place].  He has a psychiatric history of obsessive-compulsive disorder as well as an unspecified mood disorder with psychotic features versus schizoaffective disorder, with a history of 1 admission at [facility].  He has a medical history of sinus tachycardia, moderate left ventricular hypertrophy, mild mitral regurgitation, possible atrial fibrillation, elevated troponin (just completed hospitalization at [facility] for evaluation of his cardiac status), hypertension, hyperlipidemia.  He is admitted to [facility] inpatient psychiatric unit for further stabilization of his depression.  He was reportedly making homicidal statements about his daughters and suicidal statements about himself and was extremely agitated at the time of admission to the hospital.  He was started on Zyprexa and lorazepam and initially was hospitalized at [facility] to clear for cardiac status, given he had slightly elevated troponins at the time of presentation to the Emergency Department.      On interview this morning, the patient reports that he has been depressed for a long time.  He is unable to clarify if it is just a period of months or longer.  He struggles substantially with the interview, often taking a sustained period of time to answer questions and with marked paucity of speech.  He does report tremendous fear about \"Yazidism\".  He describes that he has been repeating the same prayer over and over in order to protect himself.  He does acknowledge hearing voices, stating that they are his own voice inside his head telling him to kill his daughter.  He denies wanting to act on these thoughts.  He denies current suicidal ideation, although again it again should be " noted that he denied suicidal ideation at the time of admission to [facility].      On review of the record, it sounds like he stopped taking his psychiatric medications at some point and had a significant decline over the past several weeks, where he was losing weight and stopped responding to questions and became physically weak.      PAST PSYCHIATRIC HISTORY:  Previously admitted to [facility].  Perhaps offered ECT at some point, although I am not sure if he ever had ECT.  In the past, he followed with Dr. James on an outpatient basis for OCD.  Prior trials include clomipramine, perphenazine as well as Paxil and Zoloft, per Dr. Hartman's review of previous records.  The patient is not a reliable informant at this time.      PAST CHEMICAL DEPENDENCY HISTORY:  Unknown.      PAST MEDICAL HISTORY:  Elevated troponin, sinus tachycardia, hypertension, hyperlipidemia, moderate left ventricular hypertrophy, possible atrial fibrillation.      PRIOR TO ADMISSION  MEDICATIONS:   1.  Artificial tears.   2.  Oxycodone 5 mg 1-2 tablets every 3 hours as needed for pain.   3.  Aspirin.   4.  Atarax 25 mg q.6 hours p.r.n. itching.   5.  Simvastatin.   6.  Prozac 40 mg b.i.d.   7.  Hydrochlorothiazide.   8.  Zyprexa 20 mg daily.   9.  Prilosec.   10.  Ambien 10 mg at night.   11.  Topiramate 150 mg at bedtime.   12.  Clomipramine 50 mg at bedtime.   13.  Clonazepam 0.5 mg at bedtime.     REVIEW OF SYSTEMS: Attempted to obtain 10 point review of systems but on account of patient's tremendous difficulty with answering questions at this time this was not currently possible.      FAMILY HISTORY:  Includes depression, anxiety and bipolar disorder in first-degree relatives, per Dr. Hartman's notes.      SOCIAL HISTORY:  He lives in [place] with his daughter, Nela.  His wife passed away a few years ago.  He is a nonsmoker and does not use any alcohol or drugs.      VITAL SIGNS:  Temperature 98.1, pulse 76,  respiratory rate 15, blood pressure 116/175-156/104.      MENTAL STATUS EXAMINATION:  He appears thin, markedly disheveled, seated upright on his bed.  He does not make eye contact.  He has unusual mannerisms.  He is extremely nervous and fidgety associated with his upper extremities.  These movements are at times almost stereotypic with some unusual circling motions of his thumbs.  Speech is markedly slowed, soft, and extreme paucity.  Use of language is poor.  Motor exam is restless.  Coordination, gait and station was notable for being very slow and deliberate and a bit unsteady.  Muscle strength and tone are adequate.  Anxious and markedly flat affect.  Mood is described as depressed.  Thought process is appearing disorganized.  He does appear preoccupied with internal stimuli, at times mutters to himself.  Thought content notable for recent suicidal and homicidal ideation, possible paranoia of [employer]ar[employer] themes.  Insight and judgment moderate, although he has taken medications.  He does not seem to fully understand the significance of his impairments.  Cognitive exam reveals that he is oriented to place, somewhat to circumstances and his person.  Moderately well oriented to time in terms of year and month.  He is able to recite days of the week in reverse order with effort.  Concentration is somewhat impaired as he is preoccupied internally, but he is maintaining conversation better than what is described in recent notes from previous days.  Memory is moderately impaired.  He does seem to struggle with recalling details of recent events.  General fund of knowledge is estimated to be average.      LABORATORY DATA:  Sodium 140, potassium 3.3, chloride 106, urea nitrogen 24, creatinine 0.94.  LFTs are unremarkable.  Troponin 0.55 at admission, slight elevation of 0.09 yesterday.  TSH is 0.85.  CBC is unremarkable.  Ethanol was negative.      IMPRESSION:  This is a 67-year-old gentleman with reported history of  obsessive-compulsive disorder, though at this point seems markedly psychotic and internally preoccupied.  He is afflicted by his obsessions, though at this point it seems catatonic with unusual mannerisms, extreme paucity of thought and speech.  It does sound like he has had some improvement since starting Ativan yesterday and also resuming Zyprexa as apparently he was not taking his medications consistently prior to admission.      DIAGNOSES:   1.  Unspecified schizophrenia and other spectrum disorder, rule out major depressive disorder with psychotic features, with catatonia, versus a primary psychotic disorder with catatonic features.   2.  Obsessive-compulsive disorder.   3.  Rule out bipolar spectrum disorder.   4.  Hypertension.   5.  Tachycardia.   6.  Elevated troponin.   7.  Deconditioning.      PLAN:   1.  Continue current monitoring status and 72-hour hold which was initiated on 10/28/2017 at 1715.   2.  Will obtain a urine drug screen.   3.  Vital signs every shift, watching for evidence of severe hypertension or tachycardia, given concern for underlying catatonia, although at this point the patient appears medically stable.   4.  Full code, not discussed given psychiatric condition.   5.  Anticoagulation not required as the patient is ambulating.  We will encourage ambulation during the admission.   6.  General diet.   7.  We will watch his oral nutrition and hydration closely, given concern about catatonia.   8.  Increase lorazepam to 1 mg t.i.d., given concerns for catatonia and given partial improvement on lower doses of lorazepam.  We are going to watch closely for any gait disturbance.  Will implement fall precautions, given his deconditioning and need for benzodiazepine, and given his underlying age.   9.  Continue Zyprexa 10 mg at bedtime as well as 5 mg every 6 hours as needed for agitation.   10.  Continue topiramate 150 mg at bedtime.   11.  Continue clomipramine 50 mg at bedtime.          URIEL PEREZ MD             D: 10/29/2017 14:36   T: 10/29/2017 15:34   MT:       Name:     SHANE CUNNINGHAM   MRN:      [id number]        Account:      [id number]   :      1950           Admitted:     [id number]      Document: [id number]

## 2017-10-30 LAB — TROPONIN I SERPL-MCNC: 0.02 UG/L (ref 0–0.04)

## 2017-10-30 PROCEDURE — A9270 NON-COVERED ITEM OR SERVICE: HCPCS | Mod: GY | Performed by: PSYCHIATRY & NEUROLOGY

## 2017-10-30 PROCEDURE — 93010 ELECTROCARDIOGRAM REPORT: CPT | Performed by: INTERNAL MEDICINE

## 2017-10-30 PROCEDURE — 25000132 ZZH RX MED GY IP 250 OP 250 PS 637: Mod: GY | Performed by: PSYCHIATRY & NEUROLOGY

## 2017-10-30 PROCEDURE — 99232 SBSQ HOSP IP/OBS MODERATE 35: CPT | Performed by: PHYSICIAN ASSISTANT

## 2017-10-30 PROCEDURE — 93005 ELECTROCARDIOGRAM TRACING: CPT

## 2017-10-30 PROCEDURE — 12400006 ZZH R&B MH INTERMEDIATE

## 2017-10-30 PROCEDURE — 36415 COLL VENOUS BLD VENIPUNCTURE: CPT | Performed by: PHYSICIAN ASSISTANT

## 2017-10-30 PROCEDURE — 84484 ASSAY OF TROPONIN QUANT: CPT | Performed by: PHYSICIAN ASSISTANT

## 2017-10-30 RX ADMIN — METOPROLOL TARTRATE 25 MG: 25 TABLET ORAL at 08:38

## 2017-10-30 RX ADMIN — LORAZEPAM 1 MG: 1 TABLET ORAL at 19:19

## 2017-10-30 RX ADMIN — OMEPRAZOLE 20 MG: 20 CAPSULE, DELAYED RELEASE ORAL at 08:38

## 2017-10-30 RX ADMIN — LORAZEPAM 1 MG: 1 TABLET ORAL at 08:38

## 2017-10-30 RX ADMIN — OLANZAPINE 10 MG: 10 TABLET, FILM COATED ORAL at 21:39

## 2017-10-30 RX ADMIN — LORAZEPAM 1 MG: 1 TABLET ORAL at 15:14

## 2017-10-30 RX ADMIN — TOPIRAMATE 150 MG: 100 TABLET, FILM COATED ORAL at 21:39

## 2017-10-30 RX ADMIN — SIMVASTATIN 40 MG: 40 TABLET, FILM COATED ORAL at 21:39

## 2017-10-30 RX ADMIN — ASPIRIN 81 MG: 81 TABLET, COATED ORAL at 08:38

## 2017-10-30 RX ADMIN — CLOMIPRAMINE HYDROCHLORIDE 50 MG: 50 CAPSULE ORAL at 21:39

## 2017-10-30 RX ADMIN — LISINOPRIL 10 MG: 10 TABLET ORAL at 08:38

## 2017-10-30 ASSESSMENT — ACTIVITIES OF DAILY LIVING (ADL)
ORAL_HYGIENE: WITH ASSISTANCE
LAUNDRY: WITH SUPERVISION
DRESS: WITH ASSISTANCE
GROOMING: WITH ASSISTANCE

## 2017-10-30 NOTE — PROGRESS NOTES
[facility] Psychiatric Progress Note      Interval History:   Pt seen, team meeting held with , nursing staff, OT, and PA's to review diagnosis and treatment plan. Patient reports he still experiencing both suicidal and homicidal ideations.  He still feels very despondent and hopeless.  He states he does not feel that any of his medications are helping him.  He was placed on lorazepam over the weekend and staff report that he has appeared more animated but he denies any benefit.  He continues to report intrusive thoughts.  The benefits of electroconvulsive therapy were discussed with him and he verbalized understanding and consented to that modality of treatment.  He was advised that given his recent elevated troponins and will require further medical clearance before he can undergo anesthesia and ECT.      Review of systems:   The Review of Systems is negative other than noted in the HPI     Medications:       LORazepam  1 mg Oral 3 times daily     aspirin EC  81 mg Oral Daily     lisinopril  10 mg Oral Daily     metoprolol  25 mg Oral BID     OLANZapine  10 mg Oral At Bedtime     omeprazole (priLOSEC) CR capsule 20 mg  20 mg Oral QAM AC     clomiPRAMINE (ANAFRANIL) capsule 50 mg  50 mg Oral At Bedtime     topiramate (TOPAMAX) tablet 150 mg  150 mg Oral At Bedtime     simvastatin  40 mg Oral Daily     hydrOXYzine, hypromellose-dextran, OLANZapine zydis    Mental Status Examination:     Appearance:  awake, alert, adequately groomed and casually dressed  Eye Contact:  better  Speech:  clear, coherent  Psychomotor Behavior:  no evidence of tardive dyskinesia, dystonia, or tics and fidgeting  Mood:  Depressed and dysphoric   Affect:  Flat with restricted range of affect  Thought Process:  Thought blocking with perseveration  Thought Content:  Admits to presence of suicidal ideation and homicidal ideation.  Obsessions persisted   Oriented to:  time, person, and place  Attention Span and  Concentration:  limited  Recent and Remote Memory:  limited  Fund of Knowledge: appropriate  Muscle Strength and Tone: normal  Gait and Station: Normal  Insight:  fair  Judgment:  limited          Labs/Vitals:   No results found for this or any previous visit (from the past 24 hour(s)).  B/P: 127/83, T: 97.5, P: 80, R: 17    Impression:   This is a 67-year-old gentleman with reported history of obsessive-compulsive disorder, though at this point seems markedly psychotic and internally preoccupied.  He is afflicted by his obsessions, though at this point it seems catatonic with unusual mannerisms, extreme paucity of thought and speech.  It does sound like he has had some improvement since starting Ativan yesterday and also resuming Zyprexa as apparently he was not taking his medications consistently prior to admission.       DIagnoses:     1.  Major depressive disorder with psychotic features, with catatonia, versus a primary psychotic disorder with catatonic features.   2.  Obsessive-compulsive disorder.   3.  Rule out bipolar spectrum disorder.   4.  Hypertension.   5.  Tachycardia.   6.  Elevated troponin.   7.  Deconditioning. .           Plan:   1. Written information given on medications. Side effects, risks, benefits reviewed.  2. Maintain current medications and continue hospitalization.  3. ECT clearance    Attestation:  Patient has been seen and evaluated by me,  Mateus Hurt MD    PATIENT ID  Name: Negro Aj  MRN:[id number]  YOB: 1950

## 2017-10-30 NOTE — PROGRESS NOTES
Flat affect, depressed, needs a lot of encouragement to do things. He was able to get in the shower and needed some cues, he showered on his own.  Up with stand by assist, buttocks slightly red no break in the skin, encourage ambulation.   Will start ECT on Wednesday. Denies any suicidal ideation. Isolative. He comes out for meals only.

## 2017-10-30 NOTE — PLAN OF CARE
"Problem: Depressive Symptoms  Goal: Depressive Symptoms  Signs and symptoms of listed problems will be absent or manageable.   Outcome: Improving  Doing a little better. Spent time in room bed resting, withdrawn and quiet. Flat afffect and depressed mood. But willing to interact and talk more. Delayed responses and spoke in brief sentences and would not elaborate but more sensible. Admitted \" I am not good and feel anxious \". He was willing to get up twice and walk in ITC lounge but quickly wanted to go back to bed . Declined offers to go to rest room. Was taking fluids well and Depends dry at end of shift so may be retaining urine. Talked a little about his 3 daughters, his 10 year old grandson, past jobs , hockey, football. HIs daughter Nela visited and was nice to him and he appeared to appreciate her company. He ate all of supper on his own without prompts and later a root beer float.       "

## 2017-10-30 NOTE — PROGRESS NOTES
"[facility]    Hospitalist Progress Note      Assessment & Plan   Negrosandoval Aj is a 67 year old male who presented with suicidal and homicidal ideation, initially admitted to medicine service 10/28/2017 due to elevated troponin, subsequently transferred to psychiatry for ongoing cares.      Elevated troponin  Sinus tachycardia  Moderate left ventricular hypertrophy  Mild mitral regurgitation   Possible atrial fibrillation   Troponin checked in ED due to tachycardia and some ectopy noted, mildly elevated at 0.055. Overall plateau in trend 0.087->0.090. No ischemic ST-T wave changes on EKG. Echocardiogram with moderate LVH, normal EF, no focal wall motion abnormalities. No known history of coronary artery disease, but risk factors include age, male, hypertension, hyperlipidemia. Had previous stress echocardiogram 9/21/17 which was negative for ischemic changes.  Suspect current troponin elevation secondary to significant tachycardia in setting of LVH. Given his risk factors, may benefit from provocative cardiac testing to rule out underlying coronary artery disease, however this should be completed after his psychiatric status has been stabilized as he would be a poor candidate for invasive assessment and/or intervention until this has occurred. In the meantime, have optimized his medical management in the event he has underlying coronary artery disease. Note his echocardiogram report indicated a rhythm of SVT. His telemetry had captured mostly sinus tachycardia with PACs, although with one short run of possible atrial fibrillation. He was in sinus rhythm at time of discharge to psychiatry unit.  - Heart continues to be regular with normal rate 10/30  - Continue metoprolol 25 mg BID, lisinopril 10 mg, aspirin 81 mg   - plan for ECT 11/1. Patient more talkative today but remains difficult historian. He reports some intermittent chest pain since admission. Describes this as left sided, \"feels " "like a poke\" and lasting a few seconds. Reports this occasionally \"makes him gasp.\" Last episode today before lunch. Will need to weigh risk/benefit of ECT as further cardiac workup is difficult in the setting of his decompensated psychiatric illness.  Will obtain repeat troponin this afternoon as well as repeat EKG.  Will review this information and make recommendations.       Hypertension   Blood pressure elevated in setting of significant agitation with suicidal and homicidal ideation. HCTZ discontinued in favor of metoprolol BID. BP better controlled since admission to psychiatry.   - Continue lisinopril 10 mg, metoprolol 25 mg BID  - Blood pressure adequately controlled      Hyperlipidemia  Continue prior to admission simvastatin 40 mg daily.      Suicidal and homicidal ideation  Depression  Anxiety  OCD  Presented with suicidal and homicidal ideation with decompensated functional status at home. Psychiatry consulted, subsequently transferred to inpatient psychiatry unit.   - Further management per psychiatry service    DVT Prophylaxis: Ambulate every shift  Code Status: Full Code    Disposition: Expected discharge in several days, per psychiatry     This patient was seen and examined with Dr. Alvarado who agrees with the above plan.    Cecille Gonzales PA-C    Interval History   Patient remains poor historian. Reports chest pain intermittently since admission. Describes this as left-sided and \"feels like a poke.\" resolves spontaneously after a few seconds. Denies associated nausea, diaphoresis, or shortness of breath though states it sometimes \"makes me gasp.\" He initially states chest pain started a few days prior to admission then later on that he cannot remember when he has had it.  Denies other complaints at this time. No CP currently, last episode prior to lunch.     -Data reviewed today: I reviewed all new labs and imaging results over the last 24 hours. I personally reviewed no images or EKG's " today.    Physical Exam   Temp: 97.5  F (36.4  C) Temp src: Oral BP: 127/83   Heart Rate: 78 Resp: 17        Vitals:    10/28/17 1647   Weight: 74.1 kg (163 lb 4.8 oz)     Vital Signs with Ranges  Temp:  [97.5  F (36.4  C)-97.6  F (36.4  C)] 97.5  F (36.4  C)  Heart Rate:  [] 78  Resp:  [17-18] 17  BP: (115-138)/(76-83) 127/83  I/O last 3 completed shifts:  In: 800 [P.O.:800]  Out: -     Constitutional: Alert, sitting up in chair eating lunch   ENT: normal cephalic, moist mucous membranes  Respiratory: Lungs clear to auscultation bilaterally, no increased work of breathing or wheezing  Cardiovascular: Regular rate and rhythm, no murmur, no LE edema, radial pulses +2/4  Skin/Integumen: warm, dry  MSK:  Chest wall non-tender to palpation   Neuro:  Follows commands. Answers most of my questions though responses are delayed and some responses are contradictory.       Medications        LORazepam  1 mg Oral 3 times daily     aspirin EC  81 mg Oral Daily     lisinopril  10 mg Oral Daily     metoprolol  25 mg Oral BID     OLANZapine  10 mg Oral At Bedtime     omeprazole (priLOSEC) CR capsule 20 mg  20 mg Oral QAM AC     clomiPRAMINE (ANAFRANIL) capsule 50 mg  50 mg Oral At Bedtime     topiramate (TOPAMAX) tablet 150 mg  150 mg Oral At Bedtime     simvastatin  40 mg Oral Daily       Data     Recent Labs  Lab 10/28/17  1222 10/28/17  0550 10/28/17  0020   WBC  --   --  9.9   HGB  --   --  14.2   MCV  --   --  91   PLT  --   --  237   NA  --   --  140   POTASSIUM  --  4.0 3.3*   CHLORIDE  --   --  106   CO2  --   --  25   BUN  --   --  24   CR  --   --  0.94   ANIONGAP  --   --  9   MELBA  --   --  8.7   GLC  --   --  145*   ALBUMIN  --   --  3.7   PROTTOTAL  --   --  7.5   BILITOTAL  --   --  0.5   ALKPHOS  --   --  86   ALT  --   --  50   AST  --   --  33   TROPI 0.090* 0.087* 0.055*       No results found for this or any previous visit (from the past 24 hour(s)).

## 2017-10-31 PROCEDURE — 12400006 ZZH R&B MH INTERMEDIATE

## 2017-10-31 PROCEDURE — A9270 NON-COVERED ITEM OR SERVICE: HCPCS | Mod: GY | Performed by: PSYCHIATRY & NEUROLOGY

## 2017-10-31 PROCEDURE — 25000132 ZZH RX MED GY IP 250 OP 250 PS 637: Mod: GY | Performed by: PSYCHIATRY & NEUROLOGY

## 2017-10-31 RX ORDER — OLANZAPINE 10 MG/1
10 TABLET ORAL 2 TIMES DAILY
Status: DISCONTINUED | OUTPATIENT
Start: 2017-10-31 | End: 2017-11-07

## 2017-10-31 RX ADMIN — ASPIRIN 81 MG: 81 TABLET, COATED ORAL at 08:32

## 2017-10-31 RX ADMIN — TOPIRAMATE 150 MG: 100 TABLET, FILM COATED ORAL at 21:32

## 2017-10-31 RX ADMIN — CLOMIPRAMINE HYDROCHLORIDE 50 MG: 50 CAPSULE ORAL at 21:32

## 2017-10-31 RX ADMIN — LORAZEPAM 1 MG: 1 TABLET ORAL at 14:36

## 2017-10-31 RX ADMIN — SIMVASTATIN 40 MG: 40 TABLET, FILM COATED ORAL at 21:32

## 2017-10-31 RX ADMIN — OLANZAPINE 10 MG: 10 TABLET, FILM COATED ORAL at 21:33

## 2017-10-31 RX ADMIN — OLANZAPINE 10 MG: 10 TABLET, FILM COATED ORAL at 11:19

## 2017-10-31 RX ADMIN — OMEPRAZOLE 20 MG: 20 CAPSULE, DELAYED RELEASE ORAL at 08:32

## 2017-10-31 RX ADMIN — METOPROLOL TARTRATE 25 MG: 25 TABLET ORAL at 08:33

## 2017-10-31 RX ADMIN — LORAZEPAM 1 MG: 1 TABLET ORAL at 08:32

## 2017-10-31 RX ADMIN — LISINOPRIL 10 MG: 10 TABLET ORAL at 08:33

## 2017-10-31 RX ADMIN — METOPROLOL TARTRATE 25 MG: 25 TABLET ORAL at 21:33

## 2017-10-31 NOTE — PROGRESS NOTES
"hospitalist update: chart reviewed, patient admitted to inpatient psychiatry after transfer from medical floor due to elevated troponin thought to be secondary to significant tachycardia in setting of LVH. With history of negative stress echocardiogram 9/21/17. Further cardiac evaluation was deferred to outpatient setting as patient is a poor candidate for invasive assessment and/or intervention due to psychiatric status.     Patient was seen yesterday by provider for ECT clearance scheduled for 11/1, reported intermittent chest pain since admission to hospital, most recently yesterday prior to lunch. Described as left-sided and sharp with occasional difficulty breathing, although difficult to further characterize or define in patient as he is a poor historian. Repeat EKG obtained with slight T-wave changes from that on admission, however on chart review compared to past readings appears to be patients baseline. Repeat troponin was 0.024 (previously 0.09).     Re-evaluated patient this morning, patient reports he \"probably\" had chest discomfort last evening, but cannot further describe the events surrounding pain, timing, or exact symptoms. He is unable to tell me if he has had any shortness of breath whether associated with pain or generally. When asked specifically if he has experienced any pain this morning, he describes pain in his hips and feet, does not mention chest.    Vitals reviewed and notably with excellent blood pressure and heart rate control. On exam, pt is in NAD, appears comfortable, lungs CTA bilaterally without w/c/r, heart is RRR, abdomen is nontender. At this time, patient is medically optimized for ECT. While it is very difficult to ascertain if patient is truly experiencing ongoing chest discomfort, his down-trending troponin with stable EKG do not suggest an acute process. Additionally, benefits outweigh risks to proceed with ECT and defer cardiac workup to outpatient following stabilization " of psychiatric state.    Ralegih Tao PA-C  10/31/2017, 11:04 AM  Pager: [phone]

## 2017-10-31 NOTE — PROGRESS NOTES
[facility] Psychiatric Progress Note      Interval History:   Pt seen, team meeting held with , nursing staff, OT, and PA's to review diagnosis and treatment plan. Patient reports ongoing depressive symptoms and hopelessness. He still wants to go ahead with ECT.  His daughter reportedly watched the video on ECT with him yesterday.  She had some concerns and her questions were answered when she called this morning.  Patient has been medically optimized for ECT treatment beginning tomorrow.  He denies experiencing any medication side effects but he does not feel that medications are helping him.  He was started on clomipramine again on Saturday and is currently taking it at 50 mg at bedtime.  He is open to ongoing medication adjustment to address his symptoms.  He states he does not have any further questions regarding ECT.  He denies the presence of auditory hallucinations but endorses intrusive suicidal and homicidal ideations.     Review of systems:   The Review of Systems is negative other than noted in the HPI     Medications:       OLANZapine  10 mg Oral BID     LORazepam  1 mg Oral 3 times daily     aspirin EC  81 mg Oral Daily     lisinopril  10 mg Oral Daily     metoprolol  25 mg Oral BID     omeprazole (priLOSEC) CR capsule 20 mg  20 mg Oral QAM AC     clomiPRAMINE (ANAFRANIL) capsule 50 mg  50 mg Oral At Bedtime     topiramate (TOPAMAX) tablet 150 mg  150 mg Oral At Bedtime     simvastatin  40 mg Oral Daily     hydrOXYzine, hypromellose-dextran, OLANZapine zydis    Mental Status Examination:     Appearance:  awake, alert, adequately groomed and casually dressed  Eye Contact:  better  Speech:  clear, coherent  Psychomotor Behavior:  no evidence of tardive dyskinesia, dystonia, or tics and fidgeting  Mood:  Depressed and dysphoric   Affect:  Flat with restricted range of affect  Thought Process:  Thought blocking with perseveration  Thought Content:  Admits to presence of  suicidal ideation and homicidal ideation.  Obsessions persist  Oriented to:  time, person, and place  Attention Span and Concentration:  limited  Recent and Remote Memory:  limited  Fund of Knowledge: appropriate  Muscle Strength and Tone: normal  Gait and Station: Normal  Insight:  fair  Judgment:  limited          Labs/Vitals:     Recent Results (from the past 24 hour(s))   EKG 12-lead, tracing only    Collection Time: 10/30/17  1:29 PM   Result Value Ref Range    Interpretation ECG Click View Image link to view waveform and result    Troponin I    Collection Time: 10/30/17  2:50 PM   Result Value Ref Range    Troponin I ES 0.024 0.000 - 0.045 ug/L     B/P: 127/83, T: 97.5, P: 80, R: 17    Impression:   This is a 67-year-old gentleman with reported history of obsessive-compulsive disorder, though at this point seems markedly psychotic and internally preoccupied.  He is afflicted by his obsessions, though at this point it seems catatonic with unusual mannerisms, extreme paucity of thought and speech.  It does sound like he has had some improvement since starting Ativan yesterday and also resuming Zyprexa as apparently he was not taking his medications consistently prior to admission.       DIagnoses:     1.  Major depressive disorder with psychotic features, with catatonia, versus a primary psychotic disorder with catatonic features.   2.  Obsessive-compulsive disorder.   3.  Rule out bipolar spectrum disorder.   4.  Hypertension.   5.  Tachycardia.   6.  Elevated troponin.   7.  Deconditioning. .           Plan:   1. Written information given on medications. Side effects, risks, benefits reviewed.  2.  Increase clomipramine to 100 mg p.o. q.h.s.  3.  Patient to start ECT treatment tomorrow for a total of 6-8 treatments.    Attestation:  Patient has been seen and evaluated by me,  Mateus Hurt MD    PATIENT ID  Name: Negro Aj  MRN:[id number]  YOB: 1950

## 2017-10-31 NOTE — PLAN OF CARE
"Problem: Depressive Symptoms  Goal: Depressive Symptoms  Signs and symptoms of listed problems will be absent or manageable.   Outcome: Therapy, unable to show any progress toward functional goals  Pt has been in bed all day except when in the bathroom or eating in his room.  Pt appears to be sad. 1:1 pt Said, \"I'm still hearing voices.\" Pt is reluctant to say what the voices are saying.Pt has been med compliant and has a good appetite.       "

## 2017-10-31 NOTE — PROGRESS NOTES
Daughter-Nela here tonight and expressed concern about ECT. She does not think he is in his right state of mind to decide for himself to have ECT. She says historically, he has been opposed to ECT. Reports another sister also worried about ECT. Daughter and patient shown ECT video and explained that hospitalist is still working patient up to see if he is even a candidate for ECT. Nela wants to talk to Dr. Hurt in AM. Note left for Dr. Hurt.

## 2017-10-31 NOTE — PLAN OF CARE
"Problem: Depressive Symptoms  Goal: Depressive Symptoms  Signs and symptoms of listed problems will be absent or manageable.   Outcome: No Change  Pt spent half of the afternoon isolating in his room. Pt seemed to have an anxious, paranoid, and delusional affect. Pt stated that he was \"Having bad thoughts\" followed up by stating \"Of killing people.\" Pt was staring into space and fidgeting, and was difficult to redirect from thoughts. Pt came out of their room around 2100, when his daughter visited. Pt seemed calm and watched tv for a while after the daughter left. Pt did not attend any groups. Pt ate small portions of his food and was med compliant.      "

## 2017-11-01 ENCOUNTER — APPOINTMENT (OUTPATIENT)
Dept: SURGERY | Facility: CLINIC | Age: 67
DRG: 885 | End: 2017-11-01
Attending: PSYCHIATRY & NEUROLOGY
Payer: MEDICARE

## 2017-11-01 ENCOUNTER — ANESTHESIA EVENT (OUTPATIENT)
Dept: SURGERY | Facility: CLINIC | Age: 67
End: 2017-11-01

## 2017-11-01 ENCOUNTER — ANESTHESIA (OUTPATIENT)
Dept: SURGERY | Facility: CLINIC | Age: 67
End: 2017-11-01

## 2017-11-01 LAB — INTERPRETATION ECG - MUSE: NORMAL

## 2017-11-01 PROCEDURE — 97150 GROUP THERAPEUTIC PROCEDURES: CPT | Mod: GO

## 2017-11-01 PROCEDURE — 90870 ELECTROCONVULSIVE THERAPY: CPT

## 2017-11-01 PROCEDURE — 25000132 ZZH RX MED GY IP 250 OP 250 PS 637: Mod: GY | Performed by: PSYCHIATRY & NEUROLOGY

## 2017-11-01 PROCEDURE — A9270 NON-COVERED ITEM OR SERVICE: HCPCS | Mod: GY | Performed by: PSYCHIATRY & NEUROLOGY

## 2017-11-01 PROCEDURE — 12400006 ZZH R&B MH INTERMEDIATE

## 2017-11-01 RX ORDER — SODIUM CHLORIDE, SODIUM LACTATE, POTASSIUM CHLORIDE, CALCIUM CHLORIDE 600; 310; 30; 20 MG/100ML; MG/100ML; MG/100ML; MG/100ML
500 INJECTION, SOLUTION INTRAVENOUS CONTINUOUS
Status: DISCONTINUED | OUTPATIENT
Start: 2017-11-01 | End: 2017-11-08

## 2017-11-01 RX ADMIN — CLOMIPRAMINE HYDROCHLORIDE 50 MG: 50 CAPSULE ORAL at 20:44

## 2017-11-01 RX ADMIN — SODIUM CHLORIDE, SODIUM LACTATE, POTASSIUM CHLORIDE, CALCIUM CHLORIDE: 600; 310; 30; 20 INJECTION, SOLUTION INTRAVENOUS at 06:46

## 2017-11-01 RX ADMIN — OLANZAPINE 10 MG: 10 TABLET, FILM COATED ORAL at 20:44

## 2017-11-01 RX ADMIN — SUCCINYLCHOLINE CHLORIDE 80 MG: 20 INJECTION, SOLUTION INTRAMUSCULAR; INTRAVENOUS at 06:54

## 2017-11-01 RX ADMIN — ASPIRIN 81 MG: 81 TABLET, COATED ORAL at 08:30

## 2017-11-01 RX ADMIN — LISINOPRIL 10 MG: 10 TABLET ORAL at 08:30

## 2017-11-01 RX ADMIN — METHOHEXITAL SODIUM 100 MG: 500 INJECTION, POWDER, LYOPHILIZED, FOR SOLUTION INTRAMUSCULAR; INTRAVENOUS; RECTAL at 06:54

## 2017-11-01 RX ADMIN — OMEPRAZOLE 20 MG: 20 CAPSULE, DELAYED RELEASE ORAL at 08:30

## 2017-11-01 RX ADMIN — METOPROLOL TARTRATE 25 MG: 25 TABLET ORAL at 08:30

## 2017-11-01 RX ADMIN — OLANZAPINE 10 MG: 10 TABLET, FILM COATED ORAL at 08:30

## 2017-11-01 RX ADMIN — TOPIRAMATE 150 MG: 100 TABLET, FILM COATED ORAL at 20:44

## 2017-11-01 RX ADMIN — LORAZEPAM 1 MG: 1 TABLET ORAL at 15:52

## 2017-11-01 RX ADMIN — LORAZEPAM 1 MG: 1 TABLET ORAL at 08:30

## 2017-11-01 RX ADMIN — SIMVASTATIN 40 MG: 40 TABLET, FILM COATED ORAL at 20:44

## 2017-11-01 ASSESSMENT — ENCOUNTER SYMPTOMS
DYSRHYTHMIAS: 0
SEIZURES: 0
ORTHOPNEA: 0

## 2017-11-01 ASSESSMENT — COPD QUESTIONNAIRES: COPD: 0

## 2017-11-01 NOTE — PROCEDURES
[facility] ECT Procedure Note     Negro Aj [id number]   67 year old 1950     Patient Status: Inpatient    Allergies   Allergen Reactions     Seasonal Allergies        Weight:  163 lbs 4.8 oz    Patient Preparations: Other (comment)         Diagnosis:   Major depression       Indications for ECT:   Medications ineffective       Pause for the Cause:     Right patient Yes   Right procedure/laterality settings: Yes   Right diagnosis Yes          Intra-Procedure Documentation:     Date:  11/1/2017  Time:  6:41 AM    ECT #    Treatment number this series: 1   Total treatment number: 1   Type of ECT:  Bilateral, standard    ECT Medications administered: Brevital: 100mg  Succinyl Choline: 80mg         Clinical Narrative:     ECT was administered by Thymatron machine.  Pt has been medically cleared for procedure, consent signed. Side effects, Risks and benefits reviewed.    ECT Strip Summary:   Energy Level: 50 percent  Motor Seizure Duration: 30 seconds  EEG Seizure Duration: 30 seconds    Complications: No    Plan: 2nd ECT 11/3/17  Outpatient Psychiatrist: Dr Hurt

## 2017-11-01 NOTE — PROGRESS NOTES
"[facility] Psychiatric Progress Note      Interval History:   Pt seen, team meeting held with , nursing staff, OT, and PA's to review diagnosis and treatment plan. Patient had his first ECT treatment this morning. He reports absence of headaches or jaw pain. He says he \"thinks\" he is still having self harm and homicidal ideations. He says he is unsure if medications are helping him. He does report dry mouth. Tolerating medications well without significant side effects. He is still isolating and not able to participate in groups.     Review of systems:   The Review of Systems is negative other than noted in the HPI     Medications:       OLANZapine  10 mg Oral BID     LORazepam  1 mg Oral 3 times daily     aspirin EC  81 mg Oral Daily     lisinopril  10 mg Oral Daily     metoprolol  25 mg Oral BID     omeprazole (priLOSEC) CR capsule 20 mg  20 mg Oral QAM AC     clomiPRAMINE (ANAFRANIL) capsule 50 mg  50 mg Oral At Bedtime     topiramate (TOPAMAX) tablet 150 mg  150 mg Oral At Bedtime     simvastatin  40 mg Oral Daily     lidocaine (buffered or not buffered), sodium chloride (PF), hydrOXYzine, hypromellose-dextran, OLANZapine zydis    Mental Status Examination:     Appearance:  awake, alert, adequately groomed and casually dressed  Eye Contact:  better  Speech:  clear, coherent  Psychomotor Behavior:  no evidence of tardive dyskinesia, dystonia, or tics and fidgeting  Mood:  Depressed   Affect:  Flat with restricted range of affect  Thought Process:  Thought blocking with perseveration  Thought Content:  Admits to presence of suicidal ideation and homicidal ideation.  Obsessions persist  Oriented to:  time, person, and place  Attention Span and Concentration:  limited  Recent and Remote Memory:  limited  Fund of Knowledge: appropriate  Muscle Strength and Tone: normal  Gait and Station: Normal  Insight:  fair  Judgment:  limited          Labs/Vitals:     No results found for this or any " previous visit (from the past 24 hour(s)).  B/P: 127/83, T: 97.5, P: 80, R: 17    Impression:   This is a 67-year-old gentleman with reported history of obsessive-compulsive disorder, though at this point seems markedly psychotic and internally preoccupied.  He is afflicted by his obsessions, though at this point it seems catatonic with unusual mannerisms, extreme paucity of thought and speech.  It does sound like he has had some improvement since starting Ativan yesterday and also resuming Zyprexa as apparently he was not taking his medications consistently prior to admission.       DIagnoses:     1.  Major depressive disorder with psychotic features, with catatonia, versus a primary psychotic disorder with catatonic features.   2.  Obsessive-compulsive disorder.   3.  Rule out bipolar spectrum disorder.   4.  Hypertension.   5.  Tachycardia.   6.  Elevated troponin.   7.  Deconditioning. .           Plan:   1. Written information given on medications. Side effects, risks, benefits reviewed.  2.  Schedule ECT#2 for 11/3/2017.    Attestation:  Patient has been seen and evaluated by me,  Mateus Hurt MD    PATIENT ID  Name: Negro Aj  MRN:[id number]  YOB: 1950

## 2017-11-01 NOTE — ANESTHESIA CARE TRANSFER NOTE
Patient: Negro Aj    * No procedures listed *    Diagnosis: * No pre-op diagnosis entered *  Diagnosis Additional Information: No value filed.    Anesthesia Type:   General     Note:  Airway :Nasal Cannula  Patient transferred to:PACU  Comments: At end of procedure, spontaneous respirations, patient alert to voice, able to follow commands. Oxygen via nasal cannula at 4 liters per minute to PACU. Oxygen tubing connected to wall O2 in PACU, SpO2, NiBP, and EKG monitors and alarms on and functioning, Deandra Hugger warmer connected to patient gown, report on patient's clinical status given to PACU RN, RN questions answered.Handoff Report: Identifed the Patient, Identified the Reponsible Provider, Reviewed the pertinent medical history, Discussed the surgical course, Reviewed Intra-OP anesthesia mangement and issues during anesthesia, Set expectations for post-procedure period and Allowed opportunity for questions and acknowledgement of understanding      Vitals: (Last set prior to Anesthesia Care Transfer)    CRNA VITALS  11/1/2017 0633 - 11/1/2017 0704      11/1/2017             NIBP: (!)  144/97    Pulse: 84    NIBP Mean: 115    SpO2: 99 %    Resp Rate (observed): 26    Resp Rate (set): 10                Electronically Signed By: CONCEPCIÓN Huff CRNA  November 1, 2017  7:04 AM

## 2017-11-01 NOTE — ANESTHESIA PREPROCEDURE EVALUATION
Procedure: ECT   Preop diagnosis: Depression     Allergies   Allergen Reactions     Seasonal Allergies      Past Medical History:   Diagnosis Date     Anxiety      Depression with anxiety      High cholesterol      Hypertension     newly treating in 2016     Knee pain, right      OCD (obsessive compulsive disorder)      Past Surgical History:   Procedure Laterality Date     ARTHROPLASTY KNEE Right 2/9/2016    Procedure: ARTHROPLASTY KNEE;  Surgeon: Tiera Conteh MD;  Location: [facility] OR     ARTHROSCOPY KNEE Right      Social History   Substance Use Topics     Smoking status: Never Smoker     Smokeless tobacco: Not on file     Alcohol use Yes      Comment: occasional     Prior to Admission medications    Medication Sig Start Date End Date Taking? Authorizing Provider   LORazepam (ATIVAN) 0.5 MG tablet Take 1 tablet (0.5 mg) by mouth 3 times daily 10/28/17  Yes Wong Alvarado MD   lisinopril (PRINIVIL/ZESTRIL) 10 MG tablet Take 1 tablet (10 mg) by mouth daily 10/28/17  Yes Wong Alvarado MD   TOPIRAMATE PO Take 150 mg by mouth At Bedtime   Yes Unknown, Entered By History   CLOMIPRAMINE HCL PO Take 50 mg by mouth At Bedtime   Yes Unknown, Entered By History   CLONAZEPAM PO Take 0.5 mg by mouth nightly as needed   Yes Unknown, Entered By History   SIMVASTATIN PO Take 40 mg by mouth daily   Yes Reported, Patient   Omeprazole (PRILOSEC PO) Take 20 mg by mouth every morning    Yes Reported, Patient   aspirin EC 81 MG EC tablet Take 1 tablet (81 mg) by mouth daily 10/28/17   Wong Alvarado MD   OLANZapine (ZYPREXA) 10 MG tablet Take 1 tablet (10 mg) by mouth At Bedtime (takes 2 x 10mg tablet) 10/28/17   Wong Alvarado MD   OLANZapine zydis (ZYPREXA) 5 MG ODT tab Take 1 tablet (5 mg) by mouth every 6 hours as needed for agitation 10/28/17   Wong Alvarado MD   metoprolol (LOPRESSOR) 25 MG tablet Take 1 tablet (25 mg) by mouth 2 times daily 10/28/17   Wong Alvarado MD   FLUoxetine HCl (PROZAC PO) Take 40 mg by  mouth 2 times daily Pt takes 40 mg in am, then 3 hours later takes second 40 mg to = 80 mg/day    Unknown, Entered By History   hypromellose-dextran (ARTIFICAL TEARS) SOLN Place 1 drop into the right eye 4 times daily as needed for dry eyes 8/29/16   Kenyon Leong DO   order for DME Equipment being ordered: Walker Wheels () and Walker ()  Treatment Diagnosis: Impaired gait 2/12/16   Agustin Hall PA-C     Current Facility-Administered Medications Ordered in Epic   Medication Dose Route Frequency Last Rate Last Dose     lidocaine 1 % 1 mL  1 mL Other Q1H PRN         sodium chloride (PF) 0.9% PF flush 3 mL  3 mL Intracatheter Q1H PRN         lactated ringers infusion  500 mL Intravenous Continuous         [Auto Hold] OLANZapine (zyPREXA) tablet 10 mg  10 mg Oral BID   10 mg at 10/31/17 2133     [Auto Hold] LORazepam (ATIVAN) tablet 1 mg  1 mg Oral 3 times daily   Stopped at 10/31/17 1900     [Auto Hold] hydrOXYzine (ATARAX) tablet 25-50 mg  25-50 mg Oral Q4H PRN         [Auto Hold] aspirin EC EC tablet 81 mg  81 mg Oral Daily   81 mg at 10/31/17 0832     [Auto Hold] hypromellose-dextran (ARTIFICAL TEARS) ophthalmic solution 1 drop  1 drop Right Eye 4x Daily PRN         [Auto Hold] lisinopril (PRINIVIL/ZESTRIL) tablet 10 mg  10 mg Oral Daily   10 mg at 10/31/17 0833     [Auto Hold] metoprolol (LOPRESSOR) tablet 25 mg  25 mg Oral BID   25 mg at 10/31/17 2133     [Auto Hold] OLANZapine zydis (zyPREXA) ODT tab 5 mg  5 mg Oral Q6H PRN   5 mg at 10/29/17 1038     [Auto Hold] omeprazole (priLOSEC) CR capsule 20 mg  20 mg Oral QAM AC   20 mg at 10/31/17 0832     [Auto Hold] clomiPRAMINE (ANAFRANIL) capsule 50 mg  50 mg Oral At Bedtime   50 mg at 10/31/17 2132     [Auto Hold] topiramate (TOPAMAX) tablet 150 mg  150 mg Oral At Bedtime   150 mg at 10/31/17 2132     [Auto Hold] simvastatin (ZOCOR) tablet 40 mg  40 mg Oral Daily   40 mg at 10/31/17 2132     No current Epic-ordered outpatient  prescriptions on file.       Wt Readings from Last 1 Encounters:   10/28/17 74.1 kg (163 lb 4.8 oz)     Temp Readings from Last 1 Encounters:   11/01/17 36.7  C (98  F) (Oral)     BP Readings from Last 6 Encounters:   11/01/17 129/77   10/28/17 126/84   08/17/17 (!) 164/109   08/29/16 (!) 180/106   02/12/16 142/78   05/16/15 142/54     Pulse Readings from Last 4 Encounters:   11/01/17 79   08/17/17 121   08/29/16 103   02/12/16 108     Resp Readings from Last 1 Encounters:   11/01/17 17     SpO2 Readings from Last 1 Encounters:   11/01/17 95%     Recent Labs   Lab Test  10/28/17   0550  10/28/17   0020  02/09/16   1156   NA   --   140  142   POTASSIUM  4.0  3.3*  3.9   CHLORIDE   --   106  108   CO2   --   25  27   ANIONGAP   --   9  7   GLC   --   145*  105*   BUN   --   24  17   CR   --   0.94  0.82   MELBA   --   8.7  8.4*     Recent Labs   Lab Test  10/28/17   0020  02/04/13   0039   AST  33  40   ALT  50  43   ALKPHOS  86  85   BILITOTAL  0.5  0.5     Recent Labs   Lab Test  10/28/17   0020  02/11/16   0704   02/09/16   1156   WBC  9.9   --    --   5.0   HGB  14.2  10.3*   < >  12.9*   PLT  237   --    --   172    < > = values in this interval not displayed.     Recent Labs   Lab Test  02/09/16   1156   ABO  A   RH   Pos     Recent Labs   Lab Test  02/09/16   1156   INR  0.92   PTT  26      Recent Labs   Lab Test  10/30/17   1450  10/28/17   1222  10/28/17   0550   TROPI  0.024  0.090*  0.087*     RECENT LABS:     ECG: NSR, voltage criteria for LVH, diffuse T wave changes    ECHO: 10/2017  Interpretation Summary     The rhythm was supraventricular tachycardia.  Left ventricular systolic function is normal.  The visual ejection fraction is estimated at 60-65%.  There is moderate concentric left ventricular hypertrophy.  The left ventricle is normal in size.  The right ventricle is normal in structure, function and size.  The left atrium is moderately dilated.  There is mild (1+) mitral regurgitation.    STRESS  ECHO: 2016  Final Impressions:   1. Maximum stress test with 95.1% of age predicted maximum heart rate achieved.   2. During stress exam the patient developed shortness of breath and leg pain/fatigue.   3. Poor exercise capacity.   4. There were no ischemic changes by EKG during stress.   5. Post stress, normal left ventricular size, increased global systolic function.   6. Negative stress echo for ischemia.   7. The mitral valve is sclerotic, moderate mitral regurgitation.    Anesthesia Evaluation     . Pt has had prior anesthetic. Type: Regional    No history of anesthetic complications          ROS/MED HX    ENT/Pulmonary:      (-) asthma, COPD and sleep apnea   Neurologic:      (-) seizures and CVA   Cardiovascular: Comment: Recent minor troponin elevation this admit - likely significant tachycardia in the setting of LVH.  Negative stress echo test 2016, and had no RWMAs on echo this admit.  Pt continues to complain of intermittent chest pain, but very confused as well.  Cleared for ECT.      (+) hypertension----. : . . . :. valvular problems/murmurs type: MR 1+ MR:.      (-) angina, CAD, orthopnea/PND, syncope, arrhythmias, irregular heartbeat/palpitations and angina   METS/Exercise Tolerance:     Hematologic:         Musculoskeletal:   (+) arthritis, , , -       GI/Hepatic:     (+) GERD Asymptomatic on medication,      (-) liver disease   Renal/Genitourinary:      (-) renal disease   Endo:      (-) Type II DM, thyroid disease and chronic steroid usage   Psychiatric:     (+) psychiatric history anxiety, depression and other (comment) (OCD)      Infectious Disease:         Malignancy:         Other:                     Physical Exam      Airway   Mallampati: II  TM distance: >3 FB  Neck ROM: full    Dental   (+) chipped and missing    Cardiovascular   Rhythm and rate: regular and normal  (-) no murmur    Pulmonary    breath sounds clear to auscultation(-) no wheezes                        Anesthesia  Plan      History & Physical Review  History and physical reviewed and following examination; no interval change.    ASA Status:  2 .    NPO Status:  > 8 hours    Plan for General and Other with Intravenous (Methohexital) induction.   PONV prophylaxis:  Ondansetron (or other 5HT-3)  Brevital 100  Sux 80    Recent minor troponin elevation - likely significant tachycardia in the setting of LVH.  Negative  Stress echo 2016, and had no RWMAs on echo this admit.  Pt continues to complain of intermittent chest pain, but very confused as well.  Cleared for ECT.  Will still avoid extreme/prolonged tachycardia/hypertension.       Postoperative Care  Postoperative pain management:  Multi-modal analgesia.      Consents  Anesthetic plan, risks, benefits and alternatives discussed with:  Patient..

## 2017-11-01 NOTE — PLAN OF CARE
Problem: Depressive Symptoms  Goal: Depressive Symptoms  Signs and symptoms of listed problems will be absent or manageable.   Depressed in mood. Hopeless. Slow in motor movements. Slow in response to writers attempts to engage in conversation. Acknowledges continued suicidal and homicidal thoughts. Required prompting to ambulate, stay out of bed, complete ADl's and eat dinner. Aware of plan to begin ECT in am.

## 2017-11-01 NOTE — ANESTHESIA POSTPROCEDURE EVALUATION
Patient: Negro Aj    * No procedures listed *    Diagnosis:* No pre-op diagnosis entered *  Diagnosis Additional Information: No value filed.    Anesthesia Type:  General    Note:  Anesthesia Post Evaluation    Patient location during evaluation: PACU  Patient participation: Able to fully participate in evaluation  Level of consciousness: awake  Pain management: adequate  Airway patency: patent  Cardiovascular status: acceptable  Respiratory status: acceptable  Hydration status: acceptable  PONV: none     Anesthetic complications: None          Last vitals:  Vitals:    11/01/17 0720 11/01/17 0725 11/01/17 0730   BP: 133/81 126/77 129/77   Pulse:      Resp: 18 16 17   Temp:      SpO2: 99% 98% 95%         Electronically Signed By: Lucio Humphrey MD  November 1, 2017  7:36 AM

## 2017-11-01 NOTE — PLAN OF CARE
"Problem: General Rehab Plan of Care  Goal: Occupational Therapy Goals  The patient and/or their representative will achieve their patient-specific goals related to the plan of care.  The patient-specific goals include:  INITIAL O.T. ASSESSMENT   Details:  Pt attended OT Life Skills group today. Affect was flat. Pt was quiet and withdrawn. Gaze was downward. Attention seemed poor, perhaps focused on thoughts. Pt only engaged in group discussion when specifically cued. Responses were delayed and limited to 1-2 words. Pt engaged in mindfulness activity and stated he had some difficulty but did not elaborate. Pt declined invitation to exercise group stating, \"I'll pass.\"      "

## 2017-11-02 PROCEDURE — 12400006 ZZH R&B MH INTERMEDIATE

## 2017-11-02 PROCEDURE — A9270 NON-COVERED ITEM OR SERVICE: HCPCS | Mod: GY | Performed by: PSYCHIATRY & NEUROLOGY

## 2017-11-02 PROCEDURE — 25000132 ZZH RX MED GY IP 250 OP 250 PS 637: Mod: GY | Performed by: PSYCHIATRY & NEUROLOGY

## 2017-11-02 PROCEDURE — 97150 GROUP THERAPEUTIC PROCEDURES: CPT | Mod: GO

## 2017-11-02 PROCEDURE — 90791 PSYCH DIAGNOSTIC EVALUATION: CPT

## 2017-11-02 RX ADMIN — METOPROLOL TARTRATE 25 MG: 25 TABLET ORAL at 08:28

## 2017-11-02 RX ADMIN — ASPIRIN 81 MG: 81 TABLET, COATED ORAL at 08:28

## 2017-11-02 RX ADMIN — LISINOPRIL 10 MG: 10 TABLET ORAL at 08:28

## 2017-11-02 RX ADMIN — LORAZEPAM 1 MG: 1 TABLET ORAL at 14:14

## 2017-11-02 RX ADMIN — METOPROLOL TARTRATE 25 MG: 25 TABLET ORAL at 20:23

## 2017-11-02 RX ADMIN — LORAZEPAM 1 MG: 1 TABLET ORAL at 08:28

## 2017-11-02 RX ADMIN — CLOMIPRAMINE HYDROCHLORIDE 50 MG: 50 CAPSULE ORAL at 20:23

## 2017-11-02 RX ADMIN — OLANZAPINE 10 MG: 10 TABLET, FILM COATED ORAL at 08:28

## 2017-11-02 RX ADMIN — OMEPRAZOLE 20 MG: 20 CAPSULE, DELAYED RELEASE ORAL at 08:28

## 2017-11-02 RX ADMIN — TOPIRAMATE 150 MG: 100 TABLET, FILM COATED ORAL at 20:23

## 2017-11-02 RX ADMIN — LORAZEPAM 1 MG: 1 TABLET ORAL at 20:24

## 2017-11-02 RX ADMIN — OLANZAPINE 10 MG: 10 TABLET, FILM COATED ORAL at 20:24

## 2017-11-02 RX ADMIN — SIMVASTATIN 40 MG: 40 TABLET, FILM COATED ORAL at 20:24

## 2017-11-02 ASSESSMENT — ACTIVITIES OF DAILY LIVING (ADL)
GROOMING: INDEPENDENT
ORAL_HYGIENE: INDEPENDENT
DRESS: INDEPENDENT
LAUNDRY: WITH SUPERVISION

## 2017-11-02 NOTE — PLAN OF CARE
"Problem: Depressive Symptoms  Goal: Depressive Symptoms  Signs and symptoms of listed problems will be absent or manageable.   Pt attended life skills group today. On his way back to his room, pt was confused and pushed the doors leading out of the unit. Pt stated he was \"lost\" and was taken back to his room. Pt stayed in bed until his daughter came to visit him. Pt then spent some time in the lounge, with the assist of staff, he shaved his beard and walked around the lounge. Appears depressed and disheveled. Told the doctor he \"thinks\" he is having self harm/ homicidal thoughts.       "

## 2017-11-02 NOTE — H&P
Case Management Social History    This information was obtained from patient's chart and through an interview with patient. Patient was noted to have delayed responses to questions and periodic memory lapses during the interview.     Reason for Admission:  Negro Aj is a 67 year old  male admitted to [facility] on 10/28/17.  He was on a 72 hour hold which  at 0001 today. He states that he was admitted as he was not able to manage his thoughts at home. He states that he has random thoughts to kill himself and others close to him. He is currently experiencing suicidal and homicidal thoughts continuously, but he has not experienced an urge to act on these thoughts. He states that he has experienced these thoughts for years, but they have become more repetitive over the past few weeks. He states that thoughts of trusting Carroll spark thoughts of killing his 41 year old daughter, Nela.       Previous Mental & Chemical Dependency History:  He states that he was previously hospitalized at age 44 with similar symptoms. He has no prior history of DBT, ECT or civil commitment.     He endorses occasional caffeine intake through coffee. He states that he quit smoking cigarettes when he was younger. He drinks beer on occasion. He denies any issues with gambling or drug usage. He has no prior history of chemical dependency treatment or DWI.       Social History:  He is  with his wife passing away several years ago. He has three daughters: Nela, Ngozi, and Almaz. He has four grandchildren, ages 11-16. He was born in [place], [place] and raised in [place], [place]. He moved to [place] with his family when he was a cee in high school. His parents are now . He has one older brother, José Miguel, and two younger brothers, Christopher and Bipin. He states that he has contact with all of his brothers, but contacts his oldest brother the most. He states that  his mother likely had depression.       Latter-day: He identifies as Rastafarian. He is interested in seeing a  during this hospitalization.        History: He denies any history of  service.       Education and Work History:  He graduated from [employer] in 1970. He attended [employer] for two years, studying auto mechanics. 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 He has been retired for the past couple of years.       Living Situation: He lives in a house in [place] with two of this daughters and two of his grandchildren. He has lived there since 2013.       Financial Status/Stressors:  He denies any specific financial stressors.       Medication Coverage: He does not have any issues at this time affording his medication co-pays.       Insurance:  He has Medicare for insurance.       Legal Issues:  He denies any current legal issues.       Community Resources: His primary care physician is Dr. Florentin Colby at [facility]. He states that he sees Dr. Hurt at [facility] for psychiatry. He last saw Dr. Rafy Charles in [facility] for therapy back in July or August. He does not currently have a county .       Social Functioning:  He enjoys walking, watching sports on TV, skating and playing hockey.         Discharge Considerations:  Case Management will remain available to assist with any discharge needs.

## 2017-11-02 NOTE — PROGRESS NOTES
[facility] Psychiatric Progress Note      Interval History:   Pt seen, team meeting held with , nursing staff, OT, and PA's to review diagnosis and treatment plan. Patient reports he is not feeling any better even though staff report the he is physically better and able to converse more spontaneously. He is eating openly in the lounge which he couldn't do previously. He is still having intrusive homicidal thoughts which he claims he will never act on. Tolerating medications well without significant side effects.       Review of systems:   The Review of Systems is negative other than noted in the HPI     Medications:       OLANZapine  10 mg Oral BID     LORazepam  1 mg Oral 3 times daily     aspirin EC  81 mg Oral Daily     lisinopril  10 mg Oral Daily     metoprolol  25 mg Oral BID     omeprazole (priLOSEC) CR capsule 20 mg  20 mg Oral QAM AC     clomiPRAMINE (ANAFRANIL) capsule 50 mg  50 mg Oral At Bedtime     topiramate (TOPAMAX) tablet 150 mg  150 mg Oral At Bedtime     simvastatin  40 mg Oral Daily     lidocaine (buffered or not buffered), sodium chloride (PF), hydrOXYzine, hypromellose-dextran, OLANZapine zydis    Mental Status Examination:     Appearance:  awake, alert, marginally groomed and casually dressed  Eye Contact:  better  Speech:  Sparse  Psychomotor Behavior:  no evidence of tardive dyskinesia, dystonia, or tics and fidgeting  Mood:  Depressed   Affect:  Flat with restricted range of affect  Thought Process:  Thought blocking with perseveration  Thought Content:  Admits to presence of suicidal ideation and homicidal ideation.  Obsessions persist  Oriented to:  time, person, and place  Attention Span and Concentration:  limited  Recent and Remote Memory:  limited  Fund of Knowledge: appropriate  Muscle Strength and Tone: normal  Gait and Station: Normal  Insight:  fair  Judgment:  limited          Labs/Vitals:     No results found for this or any previous visit (from the  past 24 hour(s)).  B/P: 127/83, T: 97.5, P: 80, R: 17    Impression:   This is a 67-year-old gentleman with reported history of obsessive-compulsive disorder, though at this point seems markedly psychotic and internally preoccupied.  He is afflicted by his obsessions, though at this point it seems catatonic with unusual mannerisms, extreme paucity of thought and speech.  It does sound like he has had some improvement since starting Ativan yesterday and also resuming Zyprexa as apparently he was not taking his medications consistently prior to admission.       DIagnoses:     1.  Major depressive disorder with psychotic features, with catatonia, versus a primary psychotic disorder with catatonic features.   2.  Obsessive-compulsive disorder.   3.  Rule out bipolar spectrum disorder.   4.  Hypertension.   5.  Tachycardia.   6.  Elevated troponin.   7.  Deconditioning. .           Plan:   1. Written information given on medications. Side effects, risks, benefits reviewed.  2.  Schedule ECT#2 for 11/3/2017.  3. Continue current medications and hospitalization.    Attestation:  Patient has been seen and evaluated by Mateus keller MD    PATIENT ID  Name: Negro Aj  MRN:[id number]  YOB: 1950

## 2017-11-02 NOTE — PLAN OF CARE
"Problem: Depressive Symptoms  Goal: Depressive Symptoms  Signs and symptoms of listed problems will be absent or manageable.   Outcome: No Change  Flat affect, mood calm appears depressed, when writer asked pt how he was doing today he replied \"not well, I'm still having bad thoughts\" Still endorses SI and HI thoughts. Visible in ITC lounge for meals otherwise isolative to room.       "

## 2017-11-03 ENCOUNTER — ANESTHESIA (OUTPATIENT)
Dept: SURGERY | Facility: CLINIC | Age: 67
End: 2017-11-03

## 2017-11-03 ENCOUNTER — ANESTHESIA EVENT (OUTPATIENT)
Dept: SURGERY | Facility: CLINIC | Age: 67
End: 2017-11-03

## 2017-11-03 PROCEDURE — 25000125 ZZHC RX 250: Performed by: NURSE ANESTHETIST, CERTIFIED REGISTERED

## 2017-11-03 PROCEDURE — 90870 ELECTROCONVULSIVE THERAPY: CPT

## 2017-11-03 PROCEDURE — A9270 NON-COVERED ITEM OR SERVICE: HCPCS | Mod: GY | Performed by: PSYCHIATRY & NEUROLOGY

## 2017-11-03 PROCEDURE — 25000125 ZZHC RX 250: Performed by: ANESTHESIOLOGY

## 2017-11-03 PROCEDURE — 25000128 H RX IP 250 OP 636: Performed by: ANESTHESIOLOGY

## 2017-11-03 PROCEDURE — 40000671 ZZH STATISTIC ANESTHESIA CASE

## 2017-11-03 PROCEDURE — 25000132 ZZH RX MED GY IP 250 OP 250 PS 637: Mod: GY | Performed by: PSYCHIATRY & NEUROLOGY

## 2017-11-03 PROCEDURE — 25000128 H RX IP 250 OP 636: Performed by: NURSE ANESTHETIST, CERTIFIED REGISTERED

## 2017-11-03 PROCEDURE — 12400006 ZZH R&B MH INTERMEDIATE

## 2017-11-03 RX ORDER — LORAZEPAM 0.5 MG/1
0.5 TABLET ORAL
Status: DISCONTINUED | OUTPATIENT
Start: 2017-11-03 | End: 2017-11-09

## 2017-11-03 RX ORDER — CLOMIPRAMINE HYDROCHLORIDE 50 MG/1
100 CAPSULE ORAL AT BEDTIME
Status: DISCONTINUED | OUTPATIENT
Start: 2017-11-03 | End: 2017-11-09

## 2017-11-03 RX ORDER — KETOROLAC TROMETHAMINE 30 MG/ML
30 INJECTION, SOLUTION INTRAMUSCULAR; INTRAVENOUS
Status: COMPLETED | OUTPATIENT
Start: 2017-11-03 | End: 2017-11-03

## 2017-11-03 RX ORDER — ESZOPICLONE 1 MG/1
2 TABLET, FILM COATED ORAL AT BEDTIME
Status: DISCONTINUED | OUTPATIENT
Start: 2017-11-03 | End: 2017-11-14 | Stop reason: HOSPADM

## 2017-11-03 RX ORDER — SODIUM CHLORIDE, SODIUM LACTATE, POTASSIUM CHLORIDE, CALCIUM CHLORIDE 600; 310; 30; 20 MG/100ML; MG/100ML; MG/100ML; MG/100ML
500 INJECTION, SOLUTION INTRAVENOUS CONTINUOUS
Status: DISCONTINUED | OUTPATIENT
Start: 2017-11-03 | End: 2017-11-08

## 2017-11-03 RX ADMIN — LORAZEPAM 0.5 MG: 0.5 TABLET ORAL at 14:47

## 2017-11-03 RX ADMIN — ESZOPICLONE 2 MG: 1 TABLET, FILM COATED ORAL at 21:20

## 2017-11-03 RX ADMIN — OMEPRAZOLE 20 MG: 20 CAPSULE, DELAYED RELEASE ORAL at 08:11

## 2017-11-03 RX ADMIN — CLOMIPRAMINE HYDROCHLORIDE 100 MG: 50 CAPSULE ORAL at 21:20

## 2017-11-03 RX ADMIN — OLANZAPINE 10 MG: 10 TABLET, FILM COATED ORAL at 08:11

## 2017-11-03 RX ADMIN — OLANZAPINE 10 MG: 10 TABLET, FILM COATED ORAL at 21:19

## 2017-11-03 RX ADMIN — METOPROLOL TARTRATE 25 MG: 25 TABLET ORAL at 21:19

## 2017-11-03 RX ADMIN — LIDOCAINE HYDROCHLORIDE 1 ML: 10 INJECTION, SOLUTION EPIDURAL; INFILTRATION; INTRACAUDAL; PERINEURAL at 06:14

## 2017-11-03 RX ADMIN — SIMVASTATIN 40 MG: 40 TABLET, FILM COATED ORAL at 21:19

## 2017-11-03 RX ADMIN — SUCCINYLCHOLINE CHLORIDE 100 MG: 20 INJECTION, SOLUTION INTRAMUSCULAR; INTRAVENOUS at 07:19

## 2017-11-03 RX ADMIN — METOPROLOL TARTRATE 25 MG: 25 TABLET ORAL at 08:11

## 2017-11-03 RX ADMIN — SODIUM CHLORIDE, SODIUM LACTATE, POTASSIUM CHLORIDE, CALCIUM CHLORIDE 500 ML: 600; 310; 30; 20 INJECTION, SOLUTION INTRAVENOUS at 06:15

## 2017-11-03 RX ADMIN — LISINOPRIL 10 MG: 10 TABLET ORAL at 08:12

## 2017-11-03 RX ADMIN — LORAZEPAM 1 MG: 1 TABLET ORAL at 08:12

## 2017-11-03 RX ADMIN — ASPIRIN 81 MG: 81 TABLET, COATED ORAL at 08:11

## 2017-11-03 RX ADMIN — LORAZEPAM 0.5 MG: 0.5 TABLET ORAL at 18:43

## 2017-11-03 RX ADMIN — METHOHEXITAL SODIUM 90 MG: 500 INJECTION, POWDER, LYOPHILIZED, FOR SOLUTION INTRAMUSCULAR; INTRAVENOUS; RECTAL at 07:18

## 2017-11-03 RX ADMIN — KETOROLAC TROMETHAMINE 30 MG: 30 INJECTION, SOLUTION INTRAMUSCULAR at 07:56

## 2017-11-03 ASSESSMENT — ENCOUNTER SYMPTOMS
DYSRHYTHMIAS: 0
SEIZURES: 0
ORTHOPNEA: 0

## 2017-11-03 ASSESSMENT — COPD QUESTIONNAIRES: COPD: 0

## 2017-11-03 NOTE — PROCEDURES
[facility] ECT Procedure Note     Negro Aj [id number]   67 year old 1950     Patient Status: Inpatient    Allergies   Allergen Reactions     Seasonal Allergies        Weight:  163 lbs 4.8 oz    Patient Preparations: Other (comment)         Diagnosis:   Major depression       Indications for ECT:   Medications ineffective       Pause for the Cause:     Right patient Yes   Right procedure/laterality settings: Yes   Right diagnosis Yes          Intra-Procedure Documentation:     Date:  11/3/2017  Time:  6:41 AM    ECT #    Treatment number this series: 2   Total treatment number: 2   Type of ECT:  Bilateral, standard    ECT Medications administered: Brevital: 100mg  Succinyl Choline: 80mg         Clinical Narrative:     ECT was administered by Thymatron machine.  Pt has been medically cleared for procedure, consent signed. Side effects, Risks and benefits reviewed.    ECT Strip Summary:   Energy Level: 55 percent  Motor Seizure Duration: 30 seconds  EEG Seizure Duration: 30 seconds    Complications: No    Plan: 3rd ECT 11/6/17  Outpatient Psychiatrist: Dr Hurt

## 2017-11-03 NOTE — PROGRESS NOTES
"Patient attended a shortened Process Group today.  He was encouraged to check in and identify his high and low points over the past 24 hours as well as identify something to work on in regards to a coping skills. Patient still appears to struggle with answering questions.  noted long pauses prior to answering questions and difficulty coming up with what he wants to say. His high point was quite religiously focused and centered on his trust in God. According to patient, his low point is \"running out of things to say.\" He was unable to come up with any sort of coping skill to work on over the weekend.   "

## 2017-11-03 NOTE — PROGRESS NOTES
"SPIRITUAL HEALTH SERVICES Progress Note  [facility]     visited pt on Consult request. Pt was sitting on his bed slumped over rubbing his hands together. Pt was very quiet and seemed discouraged. Pt said he lived with his daughters and son in law in a house in [facility] that they built a few years ago. Pt's wife  20 years ago and he says that he lost meaning. Pt said a good day would be being with his family talking about [place].     Pt would like to go home but says he is not sure what his purpose is. When  asked, pt says that God says pt is valuable and loved.  provided reflective listening and support, reminding pt of what God promised about never leaving or forsaking us. SH rubbed pt's back as we spoke. SH read Psalm 40. Pt welcomed prayer. Pt thanked  and said \"Hopefully, I can hold on to some of this\".     will follow    Edwin Epperson M.Div.  Chaplain Resident  [phone] Pager  "

## 2017-11-03 NOTE — ANESTHESIA POSTPROCEDURE EVALUATION
Patient: Negro Aj    * No procedures listed *    Diagnosis:* No pre-op diagnosis entered *  Diagnosis Additional Information: No value filed.    Anesthesia Type:  General    Note:  Anesthesia Post Evaluation    Patient location during evaluation: PACU  Patient participation: Able to fully participate in evaluation  Level of consciousness: awake and alert  Pain management: adequate  Airway patency: patent  Cardiovascular status: acceptable  Respiratory status: acceptable  Hydration status: acceptable  PONV: none     Anesthetic complications: None          Last vitals:  Vitals:    11/03/17 0755 11/03/17 0800 11/03/17 0809   BP: 130/83 136/90 126/85   Pulse:      Resp: 13 16 16   Temp:   36.8  C (98.2  F)   SpO2: 97% 97%          Electronically Signed By: José Miguel Barker MD  November 3, 2017  9:02 AM

## 2017-11-03 NOTE — PLAN OF CARE
Problem: Depressive Symptoms  Goal: Depressive Symptoms  Signs and symptoms of listed problems will be absent or manageable.   Pt had ECT #2 this morning. Pt was visibly tired following treatment and bed rested upon returning. Slept until about 11am and got up to eat breakfast. Pt declined morning groups and OT but attended Process Group and participated appropriately. Pt remains blunt, flat, hopeless and depressed. Pt endorses some intrusive thoughts. Pt visited with  and stated this interaction helped and he hopes he can do this again. Pt's wife passed away 20 years ago and said his life has lost meaning since. Stated he wants to go home but feels he has no purpose there. Topamax discontinued, ECT #3 Monday.

## 2017-11-03 NOTE — ANESTHESIA PREPROCEDURE EVALUATION
Anesthesia Evaluation     . Pt has had prior anesthetic. Type: Regional    No history of anesthetic complications          ROS/MED HX    ENT/Pulmonary:      (-) asthma, COPD and sleep apnea   Neurologic:      (-) seizures and CVA   Cardiovascular: Comment: Recent minor troponin elevation this admit - likely significant tachycardia in the setting of LVH.  Negative stress echo test 2016, and had no RWMAs on echo this admit.  Pt continues to complain of intermittent chest pain, but very confused as well.  Cleared for ECT.      (+) hypertension----. : . . . :. valvular problems/murmurs type: MR 1+ MR:.      (-) angina, CAD, orthopnea/PND, syncope, arrhythmias, irregular heartbeat/palpitations and angina   METS/Exercise Tolerance:     Hematologic:         Musculoskeletal:   (+) arthritis, , , -       GI/Hepatic:     (+) GERD Asymptomatic on medication,      (-) liver disease   Renal/Genitourinary:      (-) renal disease   Endo:      (-) Type II DM, thyroid disease and chronic steroid usage   Psychiatric:     (+) psychiatric history anxiety, depression and other (comment) (OCD)      Infectious Disease:         Malignancy:         Other:                     Physical Exam  Normal systems: pulmonary    Airway   Mallampati: II  TM distance: >3 FB  Neck ROM: full    Dental   (+) chipped    Cardiovascular   Rhythm and rate: regular and normal  (+) murmur       Pulmonary                     Anesthesia Plan      History & Physical Review  History and physical reviewed and following examination; no interval change.    ASA Status:  2 .    NPO Status:  > 8 hours    Plan for General with Intravenous induction.   PONV prophylaxis:  Ondansetron (or other 5HT-3)       Postoperative Care  Postoperative pain management:  IV analgesics.      Consents  Anesthetic plan, risks, benefits and alternatives discussed with:  Patient..                          .

## 2017-11-03 NOTE — PROGRESS NOTES
[facility] Psychiatric Progress Note      Interval History:   Pt seen, team meeting held with , nursing staff, OT, and PA's to review diagnosis and treatment plan. Patient reports poor sleep at night.  He claims he still had experiencing intrusive thoughts.  He had his second ECT treatment this morning and it went well. He does not endorse any self-harm thoughts or plans. He does not appear to be benefiting from Topamax and so this will be discontinued.     Review of systems:   The Review of Systems is negative other than noted in the HPI     Medications:       OLANZapine  10 mg Oral BID     LORazepam  1 mg Oral 3 times daily     aspirin EC  81 mg Oral Daily     lisinopril  10 mg Oral Daily     metoprolol  25 mg Oral BID     omeprazole (priLOSEC) CR capsule 20 mg  20 mg Oral QAM AC     clomiPRAMINE (ANAFRANIL) capsule 50 mg  50 mg Oral At Bedtime     topiramate (TOPAMAX) tablet 150 mg  150 mg Oral At Bedtime     simvastatin  40 mg Oral Daily     lidocaine (buffered or not buffered), sodium chloride (PF), lidocaine (buffered or not buffered), sodium chloride (PF), hydrOXYzine, hypromellose-dextran, OLANZapine zydis    Mental Status Examination:     Appearance:  awake, alert, marginally groomed and casually dressed  Eye Contact:  better  Speech:  Sparse  Psychomotor Behavior:  no evidence of tardive dyskinesia, dystonia, or tics and fidgeting  Mood:  Depressed   Affect:  Flat with restricted range of affect  Thought Process:  Thought blocking with perseveration  Thought Content:  Admits to presence of suicidal ideation and homicidal ideation.  Obsessions persist  Oriented to:  time, person, and place  Attention Span and Concentration:  limited  Recent and Remote Memory:  limited  Fund of Knowledge: appropriate  Muscle Strength and Tone: normal  Gait and Station: Normal  Insight:  fair  Judgment:  limited          Labs/Vitals:     No results found for this or any previous visit (from the  past 24 hour(s)).  B/P: 127/83, T: 97.5, P: 80, R: 17    Impression:   This is a 67-year-old gentleman with reported history of obsessive-compulsive disorder, though at this point seems markedly psychotic and internally preoccupied.  He is afflicted by his obsessions, though at this point it seems catatonic with unusual mannerisms, extreme paucity of thought and speech.  It does sound like he has had some improvement since starting Ativan yesterday and also resuming Zyprexa as apparently he was not taking his medications consistently prior to admission.       DIagnoses:     1.  Major depressive disorder with psychotic features, with catatonia, versus a primary psychotic disorder with catatonic features.   2.  Obsessive-compulsive disorder.   3.  Rule out bipolar spectrum disorder.   4.  Hypertension.   5.  Tachycardia.   6.  Elevated troponin.   7.  Deconditioning. .           Plan:   1. Written information given on medications. Side effects, risks, benefits reviewed.  2. Schedule ECT#3 for 11/6/2017.  3. Discontinue Topamax and reduce lorazepam to 0.5 mg t.i.d.  4. Lunesta 2 mg p.o. Q.h.s.    Attestation:  Patient has been seen and evaluated by me,  Mateus Hurt MD    PATIENT ID  Name: Negro Aj  MRN:[id number]  YOB: 1950

## 2017-11-03 NOTE — PLAN OF CARE
"Problem: Depressive Symptoms  Goal: Depressive Symptoms  Signs and symptoms of listed problems will be absent or manageable.   Outcome: Improving  Bed resting or sleeping  Earlier in shift Came out and ate all of supper . Taking fluids well. Flat affect and depressed appearance . Daughter Nela showed up. He perked up. Was more alert, less delayed responses, more animated, a little more spontaneous. Was better able to engage in conversation and able to elaborate a little better. When asked about his mood, he replied \" I don't feel good \". He was out of room for 2 hours and interacted with his daughter some . He walked the SDU  hallway up and down 8 times. He was slow but balance was okay and he felt more pain . He was more motivated, ate snacks and showed a little more interest .       "

## 2017-11-04 PROCEDURE — A9270 NON-COVERED ITEM OR SERVICE: HCPCS | Mod: GY | Performed by: PSYCHIATRY & NEUROLOGY

## 2017-11-04 PROCEDURE — 25000132 ZZH RX MED GY IP 250 OP 250 PS 637: Mod: GY | Performed by: PSYCHIATRY & NEUROLOGY

## 2017-11-04 PROCEDURE — 12400006 ZZH R&B MH INTERMEDIATE

## 2017-11-04 RX ADMIN — CLOMIPRAMINE HYDROCHLORIDE 100 MG: 50 CAPSULE ORAL at 21:40

## 2017-11-04 RX ADMIN — LORAZEPAM 0.5 MG: 0.5 TABLET ORAL at 19:03

## 2017-11-04 RX ADMIN — LISINOPRIL 10 MG: 10 TABLET ORAL at 09:05

## 2017-11-04 RX ADMIN — HYDROXYZINE HYDROCHLORIDE 25 MG: 25 TABLET ORAL at 12:20

## 2017-11-04 RX ADMIN — OLANZAPINE 10 MG: 10 TABLET, FILM COATED ORAL at 21:39

## 2017-11-04 RX ADMIN — LORAZEPAM 0.5 MG: 0.5 TABLET ORAL at 14:37

## 2017-11-04 RX ADMIN — METOPROLOL TARTRATE 25 MG: 25 TABLET ORAL at 21:39

## 2017-11-04 RX ADMIN — OMEPRAZOLE 20 MG: 20 CAPSULE, DELAYED RELEASE ORAL at 09:05

## 2017-11-04 RX ADMIN — OLANZAPINE 10 MG: 10 TABLET, FILM COATED ORAL at 09:05

## 2017-11-04 RX ADMIN — SIMVASTATIN 40 MG: 40 TABLET, FILM COATED ORAL at 21:40

## 2017-11-04 RX ADMIN — LORAZEPAM 0.5 MG: 0.5 TABLET ORAL at 09:06

## 2017-11-04 RX ADMIN — ESZOPICLONE 2 MG: 1 TABLET, FILM COATED ORAL at 21:39

## 2017-11-04 RX ADMIN — ASPIRIN 81 MG: 81 TABLET, COATED ORAL at 09:06

## 2017-11-04 RX ADMIN — METOPROLOL TARTRATE 25 MG: 25 TABLET ORAL at 09:05

## 2017-11-04 NOTE — PLAN OF CARE
"Problem: Depressive Symptoms  Goal: Depressive Symptoms  Signs and symptoms of listed problems will be absent or manageable.   Outcome: No Change  Patient reports \"not doing well,\" today. He states he is still having a lot or repeating, \"bad, intrusive,\" thoughts. He reports he is still having SI and HI. Contracts for safety though he says that he thinks about ways to get out of here. Patient states he has not noticed any changes with the ECT. He feels he has to take time to think and find words and what he wants to say before being able to speak. Patient spent time in his room, in the lounge and he visited with his daughter this evening.       "

## 2017-11-04 NOTE — PLAN OF CARE
"Problem: Depressive Symptoms  Goal: Depressive Symptoms  Signs and symptoms of listed problems will be absent or manageable.   Outcome: No Change  Patient stated \" I have fears of killing my daughter\"  \"I have thoughts of stabbing her\"  \" I think about killing myself\"  Stated that he did not have a plan.  He stated that he feels terrible and stated that it was his anxiety.  He is slow to respond but he is alert and oriented x3.  He went to one group.  He only speaks if spoken to.      "

## 2017-11-05 PROCEDURE — 12400006 ZZH R&B MH INTERMEDIATE

## 2017-11-05 PROCEDURE — 25000132 ZZH RX MED GY IP 250 OP 250 PS 637: Mod: GY | Performed by: PSYCHIATRY & NEUROLOGY

## 2017-11-05 PROCEDURE — A9270 NON-COVERED ITEM OR SERVICE: HCPCS | Mod: GY | Performed by: PSYCHIATRY & NEUROLOGY

## 2017-11-05 RX ADMIN — ESZOPICLONE 2 MG: 1 TABLET, FILM COATED ORAL at 20:36

## 2017-11-05 RX ADMIN — OMEPRAZOLE 20 MG: 20 CAPSULE, DELAYED RELEASE ORAL at 09:16

## 2017-11-05 RX ADMIN — METOPROLOL TARTRATE 25 MG: 25 TABLET ORAL at 09:17

## 2017-11-05 RX ADMIN — LORAZEPAM 0.5 MG: 0.5 TABLET ORAL at 15:46

## 2017-11-05 RX ADMIN — ASPIRIN 81 MG: 81 TABLET, COATED ORAL at 09:18

## 2017-11-05 RX ADMIN — SIMVASTATIN 40 MG: 40 TABLET, FILM COATED ORAL at 20:36

## 2017-11-05 RX ADMIN — LISINOPRIL 10 MG: 10 TABLET ORAL at 09:18

## 2017-11-05 RX ADMIN — OLANZAPINE 10 MG: 10 TABLET, FILM COATED ORAL at 20:36

## 2017-11-05 RX ADMIN — LORAZEPAM 0.5 MG: 0.5 TABLET ORAL at 09:18

## 2017-11-05 RX ADMIN — METOPROLOL TARTRATE 25 MG: 25 TABLET ORAL at 20:36

## 2017-11-05 RX ADMIN — CLOMIPRAMINE HYDROCHLORIDE 100 MG: 50 CAPSULE ORAL at 20:36

## 2017-11-05 RX ADMIN — OLANZAPINE 10 MG: 10 TABLET, FILM COATED ORAL at 09:17

## 2017-11-05 NOTE — PLAN OF CARE
Problem: Depressive Symptoms  Goal: Depressive Symptoms  Signs and symptoms of listed problems will be absent or manageable.   Pt has been isolative and withdrawn. Spent the whole shift in the lounge watching the game, but not social. His daughter came to visit, and with her encouragement, he asked staff for nail clippers. He appears depressed, and scared to answer questions. He is slow to respond to questions. Still having SI and HI. Worried ECT is not helping. ECT #3 on Monday.

## 2017-11-06 ENCOUNTER — ANESTHESIA (OUTPATIENT)
Dept: SURGERY | Facility: CLINIC | Age: 67
End: 2017-11-06

## 2017-11-06 ENCOUNTER — ANESTHESIA EVENT (OUTPATIENT)
Dept: SURGERY | Facility: CLINIC | Age: 67
End: 2017-11-06

## 2017-11-06 PROCEDURE — 97150 GROUP THERAPEUTIC PROCEDURES: CPT | Mod: GO

## 2017-11-06 PROCEDURE — A9270 NON-COVERED ITEM OR SERVICE: HCPCS | Mod: GY | Performed by: PSYCHIATRY & NEUROLOGY

## 2017-11-06 PROCEDURE — 76942 ECHO GUIDE FOR BIOPSY: CPT

## 2017-11-06 PROCEDURE — 12400006 ZZH R&B MH INTERMEDIATE

## 2017-11-06 PROCEDURE — 40000671 ZZH STATISTIC ANESTHESIA CASE

## 2017-11-06 PROCEDURE — 90853 GROUP PSYCHOTHERAPY: CPT

## 2017-11-06 PROCEDURE — 25000125 ZZHC RX 250: Performed by: NURSE ANESTHETIST, CERTIFIED REGISTERED

## 2017-11-06 PROCEDURE — 25000132 ZZH RX MED GY IP 250 OP 250 PS 637: Mod: GY | Performed by: PSYCHIATRY & NEUROLOGY

## 2017-11-06 PROCEDURE — 25000128 H RX IP 250 OP 636: Performed by: NURSE ANESTHETIST, CERTIFIED REGISTERED

## 2017-11-06 RX ADMIN — ESZOPICLONE 2 MG: 1 TABLET, FILM COATED ORAL at 21:58

## 2017-11-06 RX ADMIN — OLANZAPINE 10 MG: 10 TABLET, FILM COATED ORAL at 08:26

## 2017-11-06 RX ADMIN — METOPROLOL TARTRATE 25 MG: 25 TABLET ORAL at 21:59

## 2017-11-06 RX ADMIN — ASPIRIN 81 MG: 81 TABLET, COATED ORAL at 08:27

## 2017-11-06 RX ADMIN — OLANZAPINE 10 MG: 10 TABLET, FILM COATED ORAL at 21:46

## 2017-11-06 RX ADMIN — LORAZEPAM 0.5 MG: 0.5 TABLET ORAL at 18:57

## 2017-11-06 RX ADMIN — SUCCINYLCHOLINE CHLORIDE 100 MG: 20 INJECTION, SOLUTION INTRAMUSCULAR; INTRAVENOUS at 06:48

## 2017-11-06 RX ADMIN — CLOMIPRAMINE HYDROCHLORIDE 100 MG: 50 CAPSULE ORAL at 21:58

## 2017-11-06 RX ADMIN — LORAZEPAM 0.5 MG: 0.5 TABLET ORAL at 15:09

## 2017-11-06 RX ADMIN — METHOHEXITAL SODIUM 90 MG: 500 INJECTION, POWDER, LYOPHILIZED, FOR SOLUTION INTRAMUSCULAR; INTRAVENOUS; RECTAL at 06:48

## 2017-11-06 RX ADMIN — LORAZEPAM 0.5 MG: 0.5 TABLET ORAL at 08:27

## 2017-11-06 RX ADMIN — LISINOPRIL 10 MG: 10 TABLET ORAL at 08:26

## 2017-11-06 RX ADMIN — METOPROLOL TARTRATE 25 MG: 25 TABLET ORAL at 08:27

## 2017-11-06 RX ADMIN — SIMVASTATIN 40 MG: 40 TABLET, FILM COATED ORAL at 21:57

## 2017-11-06 ASSESSMENT — ACTIVITIES OF DAILY LIVING (ADL)
LAUNDRY: WITH SUPERVISION
ORAL_HYGIENE: INDEPENDENT
GROOMING: INDEPENDENT
DRESS: INDEPENDENT

## 2017-11-06 NOTE — PROGRESS NOTES
[facility] Psychiatric Progress Note      Interval History:   Pt seen, team meeting held with , nursing staff, OT, and PA's to review diagnosis and treatment plan. Patient had his third ECT treatment earlier this morning.  He reports that he is still experiencing  intrusive thoughts about killing his daughter, but denies any desire or intent.  He reports he is however sleeping better on his current medications.  He does not know if ECT is helping him or not.  Staff report that he does not converse with his peers, except he is spoken to.  He does not endorse any untoward effects from his current medications.  He states he is wandering while happened to him when he is done with ECT.     Review of systems:   The Review of Systems is negative other than noted in the HPI     Medications:       LORazepam  0.5 mg Oral 3 times daily     clomiPRAMINE (ANAFRANIL) capsule 100 mg  100 mg Oral At Bedtime     eszopiclone  2 mg Oral At Bedtime     OLANZapine  10 mg Oral BID     aspirin EC  81 mg Oral Daily     lisinopril  10 mg Oral Daily     metoprolol  25 mg Oral BID     omeprazole (priLOSEC) CR capsule 20 mg  20 mg Oral QAM AC     simvastatin  40 mg Oral Daily     lidocaine (buffered or not buffered), sodium chloride (PF), lidocaine (buffered or not buffered), sodium chloride (PF), hydrOXYzine, hypromellose-dextran, OLANZapine zydis    Mental Status Examination:     Appearance:  awake, alert, marginally groomed and casually dressed  Eye Contact:  better  Speech:  Sparse  Psychomotor Behavior:  no evidence of tardive dyskinesia, dystonia, or tics and fidgeting  Mood:  Mildly improved  Affect:  Flat with restricted range of affect  Thought Process:  Thought blocking with perseveration  Thought Content:  Admits to presence of suicidal ideation and homicidal ideation.  Obsessions persist  Oriented to:  time, person, and place  Attention Span and Concentration:  limited  Recent and Remote Memory:   limited  Fund of Knowledge: appropriate  Muscle Strength and Tone: normal  Gait and Station: Normal  Insight:  fair  Judgment:  limited          Labs/Vitals:     No results found for this or any previous visit (from the past 24 hour(s)).  B/P: 127/83, T: 97.5, P: 80, R: 17    Impression:   This is a 67-year-old gentleman with reported history of obsessive-compulsive disorder, though at this point seems markedly psychotic and internally preoccupied.  He is afflicted by his obsessions, though at this point it seems catatonic with unusual mannerisms, extreme paucity of thought and speech.  It does sound like he has had some improvement since starting Ativan yesterday and also resuming Zyprexa as apparently he was not taking his medications consistently prior to admission.       DIagnoses:     1.  Major depressive disorder with psychotic features, with catatonia, versus a primary psychotic disorder with catatonic features.   2.  Obsessive-compulsive disorder.   3.  Rule out bipolar spectrum disorder.   4.  Hypertension.   5.  Tachycardia.   6.  Elevated troponin.   7.  Deconditioning. .           Plan:   1. Written information given on medications. Side effects, risks, benefits reviewed.  2. Schedule ECT#4 for 11/8/2017.  3. Continue hospitalization.    Attestation:  Patient has been seen and evaluated by me,  Mateus Hurt MD    PATIENT ID  Name: Negro Aj  MRN:[id number]  YOB: 1950

## 2017-11-06 NOTE — ANESTHESIA POSTPROCEDURE EVALUATION
Patient: Negro Aj    * No procedures listed *    Diagnosis:* No pre-op diagnosis entered *  Diagnosis Additional Information: No value filed.    Anesthesia Type:  General    Note:  Anesthesia Post Evaluation    Patient location during evaluation: PACU  Patient participation: Able to fully participate in evaluation  Level of consciousness: awake  Pain management: adequate  Airway patency: patent  Cardiovascular status: acceptable  Respiratory status: acceptable  Hydration status: acceptable  PONV: none     Anesthetic complications: None          Last vitals:  Vitals:    11/06/17 0657 11/06/17 0700 11/06/17 0705   BP:  (!) 154/96 (!) 151/95   Pulse:      Resp: 16 14 13   Temp:      SpO2: 97% 97% 97%         Electronically Signed By: Jil Betancourt MD  November 6, 2017  7:11 AM

## 2017-11-06 NOTE — ANESTHESIA CARE TRANSFER NOTE
Patient: Negro Aj    * No procedures listed *    Diagnosis: * No pre-op diagnosis entered *  Diagnosis Additional Information: No value filed.    Anesthesia Type:   General     Note:  Airway :Nasal Cannula  Patient transferred to:PACU  Comments: Vital signs stable on 3L NC, no complaints of pain, report given to RN before transfer of patient care. Handoff Report: Identifed the Patient, Identified the Reponsible Provider, Reviewed the pertinent medical history, Discussed the surgical course, Reviewed Intra-OP anesthesia mangement and issues during anesthesia, Set expectations for post-procedure period and Allowed opportunity for questions and acknowledgement of understanding      Vitals: (Last set prior to Anesthesia Care Transfer)    CRNA VITALS  11/6/2017 0626 - 11/6/2017 0656      11/6/2017             Resp Rate (set): 10                Electronically Signed By: CONCEPCIÓN Cabezas CRNA  November 6, 2017  6:56 AM

## 2017-11-06 NOTE — ANESTHESIA PREPROCEDURE EVALUATION
Anesthesia Evaluation     . Pt has had prior anesthetic.     No history of anesthetic complications          ROS/MED HX    ENT/Pulmonary:      (-) sleep apnea   Neurologic:       Cardiovascular: Comment: Atypical chest pain  2016 stress test - no ischemia    (+) hypertension----. : . . . :. .       METS/Exercise Tolerance:     Hematologic:         Musculoskeletal:   (+) arthritis, , , -       GI/Hepatic:        (-) GERD   Renal/Genitourinary:         Endo:         Psychiatric:     (+) psychiatric history anxiety and depression      Infectious Disease:         Malignancy:         Other:                     Physical Exam  Normal systems: dental    Airway     Dental     Cardiovascular   Rhythm and rate: regular and normal      Pulmonary    breath sounds clear to auscultation                    Anesthesia Plan      History & Physical Review  History and physical reviewed and following examination; no interval change.    ASA Status:  2 .    NPO Status:  > 8 hours    Plan for General with Intravenous induction.     Brevital 90 and succinylcholine 100 last time      Postoperative Care      Consents  Anesthetic plan, risks, benefits and alternatives discussed with:  Patient..                          .  Anesthesia Evaluation     . Pt has had prior anesthetic. Type: Regional    No history of anesthetic complications          ROS/MED HX    ENT/Pulmonary:      (-) asthma, COPD and sleep apnea   Neurologic:      (-) seizures and CVA   Cardiovascular: Comment: Recent minor troponin elevation this admit - likely significant tachycardia in the setting of LVH.  Negative stress echo test 2016, and had no RWMAs on echo this admit.  Pt continues to complain of intermittent chest pain, but very confused as well.  Cleared for ECT.      (+) hypertension----. : . . . :. valvular problems/murmurs type: MR 1+ MR:.      (-) angina, CAD, orthopnea/PND, syncope, arrhythmias, irregular heartbeat/palpitations and angina   METS/Exercise  Tolerance:     Hematologic:         Musculoskeletal:   (+) arthritis, , , -       GI/Hepatic:     (+) GERD Asymptomatic on medication,      (-) liver disease   Renal/Genitourinary:      (-) renal disease   Endo:      (-) Type II DM, thyroid disease and chronic steroid usage   Psychiatric:     (+) psychiatric history anxiety, depression and other (comment) (OCD)      Infectious Disease:         Malignancy:         Other:                     Physical Exam  Normal systems: pulmonary    Airway   Mallampati: II  TM distance: >3 FB  Neck ROM: full    Dental   (+) chipped    Cardiovascular   Rhythm and rate: regular and normal  (+) murmur       Pulmonary                     Anesthesia Plan      History & Physical Review  History and physical reviewed and following examination; no interval change.    ASA Status:  2 .    NPO Status:  > 8 hours    Plan for General with Intravenous induction.   PONV prophylaxis:  Ondansetron (or other 5HT-3)       Postoperative Care  Postoperative pain management:  IV analgesics.      Consents  Anesthetic plan, risks, benefits and alternatives discussed with:  Patient..                          .  Anesthesia Evaluation     . Pt has had prior anesthetic. Type: Regional    No history of anesthetic complications          ROS/MED HX    ENT/Pulmonary:      (-) asthma, COPD and sleep apnea   Neurologic:      (-) seizures and CVA   Cardiovascular: Comment: Recent minor troponin elevation this admit - likely significant tachycardia in the setting of LVH.  Negative stress echo test 2016, and had no RWMAs on echo this admit.  Pt continues to complain of intermittent chest pain, but very confused as well.  Cleared for ECT.      (+) hypertension----. : . . . :. valvular problems/murmurs type: MR 1+ MR:.      (-) angina, CAD, orthopnea/PND, syncope, arrhythmias, irregular heartbeat/palpitations and angina   METS/Exercise Tolerance:     Hematologic:         Musculoskeletal:   (+) arthritis, , , -        GI/Hepatic:     (+) GERD Asymptomatic on medication,      (-) liver disease   Renal/Genitourinary:      (-) renal disease   Endo:      (-) Type II DM, thyroid disease and chronic steroid usage   Psychiatric:     (+) psychiatric history anxiety, depression and other (comment) (OCD)      Infectious Disease:         Malignancy:         Other:                     Physical Exam  Normal systems: pulmonary    Airway   Mallampati: II  TM distance: >3 FB  Neck ROM: full    Dental   (+) chipped    Cardiovascular   Rhythm and rate: regular and normal  (+) murmur       Pulmonary                     Anesthesia Plan      History & Physical Review  History and physical reviewed and following examination; no interval change.    ASA Status:  2 .    NPO Status:  > 8 hours    Plan for General with Intravenous induction.   PONV prophylaxis:  Ondansetron (or other 5HT-3)       Postoperative Care  Postoperative pain management:  IV analgesics.      Consents  Anesthetic plan, risks, benefits and alternatives discussed with:  Patient..                          .  Procedure: ECT   Preop diagnosis: Depression     Allergies   Allergen Reactions     Seasonal Allergies      Past Medical History:   Diagnosis Date     Anxiety      Depression with anxiety      High cholesterol      Hypertension     newly treating in 2016     Knee pain, right      OCD (obsessive compulsive disorder)      Past Surgical History:   Procedure Laterality Date     ARTHROPLASTY KNEE Right 2/9/2016    Procedure: ARTHROPLASTY KNEE;  Surgeon: Tiera Conteh MD;  Location: [facility]     ARTHROSCOPY KNEE Right      Social History   Substance Use Topics     Smoking status: Never Smoker     Smokeless tobacco: Not on file     Alcohol use Yes      Comment: occasional     Prior to Admission medications    Medication Sig Start Date End Date Taking? Authorizing Provider   LORazepam (ATIVAN) 0.5 MG tablet Take 1 tablet (0.5 mg) by mouth 3 times daily 10/28/17  Yes Wong Alvarado,  MD   lisinopril (PRINIVIL/ZESTRIL) 10 MG tablet Take 1 tablet (10 mg) by mouth daily 10/28/17  Yes Wong Alvarado MD   TOPIRAMATE PO Take 150 mg by mouth At Bedtime   Yes Unknown, Entered By History   CLOMIPRAMINE HCL PO Take 50 mg by mouth At Bedtime   Yes Unknown, Entered By History   CLONAZEPAM PO Take 0.5 mg by mouth nightly as needed   Yes Unknown, Entered By History   SIMVASTATIN PO Take 40 mg by mouth daily   Yes Reported, Patient   Omeprazole (PRILOSEC PO) Take 20 mg by mouth every morning    Yes Reported, Patient   aspirin EC 81 MG EC tablet Take 1 tablet (81 mg) by mouth daily 10/28/17   Wong Alvarado MD   OLANZapine (ZYPREXA) 10 MG tablet Take 1 tablet (10 mg) by mouth At Bedtime (takes 2 x 10mg tablet) 10/28/17   Wong Alvarado MD   OLANZapine zydis (ZYPREXA) 5 MG ODT tab Take 1 tablet (5 mg) by mouth every 6 hours as needed for agitation 10/28/17   Wong Alvarado MD   metoprolol (LOPRESSOR) 25 MG tablet Take 1 tablet (25 mg) by mouth 2 times daily 10/28/17   Wong Alvarado MD   FLUoxetine HCl (PROZAC PO) Take 40 mg by mouth 2 times daily Pt takes 40 mg in am, then 3 hours later takes second 40 mg to = 80 mg/day    Unknown, Entered By History   hypromellose-dextran (ARTIFICAL TEARS) SOLN Place 1 drop into the right eye 4 times daily as needed for dry eyes 8/29/16   Kenyon Leong DO   order for DME Equipment being ordered: Walker Wheels () and Walker ()  Treatment Diagnosis: Impaired gait 2/12/16   Agustin Hall PA-C     Current Facility-Administered Medications Ordered in Epic   Medication Dose Route Frequency Last Rate Last Dose     lidocaine 1 % 1 mL  1 mL Other Q1H PRN         sodium chloride (PF) 0.9% PF flush 3 mL  3 mL Intracatheter Q1H PRN         lactated ringers infusion  500 mL Intravenous Continuous         [Auto Hold] LORazepam (ATIVAN) tablet 0.5 mg  0.5 mg Oral 3 times daily   Stopped at 11/05/17 1900     [Auto Hold] clomiPRAMINE (ANAFRANIL)  capsule 100 mg  100 mg Oral At Bedtime   100 mg at 11/05/17 2036     [Auto Hold] eszopiclone (LUNESTA) tablet 2 mg  2 mg Oral At Bedtime   2 mg at 11/05/17 2036     lidocaine 1 % 1 mL  1 mL Other Q1H PRN   1 mL at 11/03/17 0614     sodium chloride (PF) 0.9% PF flush 3 mL  3 mL Intracatheter Q1H PRN         lactated ringers infusion  500 mL Intravenous Continuous 25 mL/hr at 11/03/17 0615 500 mL at 11/03/17 0615     [Auto Hold] OLANZapine (zyPREXA) tablet 10 mg  10 mg Oral BID   10 mg at 11/05/17 2036     [Auto Hold] hydrOXYzine (ATARAX) tablet 25-50 mg  25-50 mg Oral Q4H PRN   25 mg at 11/04/17 1220     [Auto Hold] aspirin EC EC tablet 81 mg  81 mg Oral Daily   81 mg at 11/05/17 0918     [Auto Hold] hypromellose-dextran (ARTIFICAL TEARS) ophthalmic solution 1 drop  1 drop Right Eye 4x Daily PRN         [Auto Hold] lisinopril (PRINIVIL/ZESTRIL) tablet 10 mg  10 mg Oral Daily   10 mg at 11/05/17 0918     [Auto Hold] metoprolol (LOPRESSOR) tablet 25 mg  25 mg Oral BID   25 mg at 11/05/17 2036     [Auto Hold] OLANZapine zydis (zyPREXA) ODT tab 5 mg  5 mg Oral Q6H PRN   5 mg at 10/29/17 1038     [Auto Hold] omeprazole (priLOSEC) CR capsule 20 mg  20 mg Oral QAM AC   20 mg at 11/05/17 0916     [Auto Hold] simvastatin (ZOCOR) tablet 40 mg  40 mg Oral Daily   40 mg at 11/05/17 2036     No current Epic-ordered outpatient prescriptions on file.       Wt Readings from Last 1 Encounters:   10/28/17 74.1 kg (163 lb 4.8 oz)     Temp Readings from Last 1 Encounters:   11/06/17 36.3  C (97.3  F) (Oral)     BP Readings from Last 6 Encounters:   11/06/17 118/77   10/28/17 126/84   08/17/17 (!) 164/109   08/29/16 (!) 180/106   02/12/16 142/78   05/16/15 142/54     Pulse Readings from Last 4 Encounters:   11/05/17 73   08/17/17 121   08/29/16 103   02/12/16 108     Resp Readings from Last 1 Encounters:   11/06/17 16     SpO2 Readings from Last 1 Encounters:   11/06/17 95%     Recent Labs   Lab Test  10/28/17   0550  10/28/17   0020   02/09/16   1156   NA   --   140  142   POTASSIUM  4.0  3.3*  3.9   CHLORIDE   --   106  108   CO2   --   25  27   ANIONGAP   --   9  7   GLC   --   145*  105*   BUN   --   24  17   CR   --   0.94  0.82   MELBA   --   8.7  8.4*     Recent Labs   Lab Test  10/28/17   0020  02/04/13   0039   AST  33  40   ALT  50  43   ALKPHOS  86  85   BILITOTAL  0.5  0.5     Recent Labs   Lab Test  10/28/17   0020  02/11/16   0704   02/09/16   1156   WBC  9.9   --    --   5.0   HGB  14.2  10.3*   < >  12.9*   PLT  237   --    --   172    < > = values in this interval not displayed.     Recent Labs   Lab Test  02/09/16   1156   ABO  A   RH   Pos     Recent Labs   Lab Test  02/09/16   1156   INR  0.92   PTT  26      Recent Labs   Lab Test  10/30/17   1450  10/28/17   1222  10/28/17   0550   TROPI  0.024  0.090*  0.087*     RECENT LABS:     ECG: NSR, voltage criteria for LVH, diffuse T wave changes    ECHO: 10/2017  Interpretation Summary     The rhythm was supraventricular tachycardia.  Left ventricular systolic function is normal.  The visual ejection fraction is estimated at 60-65%.  There is moderate concentric left ventricular hypertrophy.  The left ventricle is normal in size.  The right ventricle is normal in structure, function and size.  The left atrium is moderately dilated.  There is mild (1+) mitral regurgitation.    STRESS ECHO: 2016  Final Impressions:   1. Maximum stress test with 95.1% of age predicted maximum heart rate achieved.   2. During stress exam the patient developed shortness of breath and leg pain/fatigue.   3. Poor exercise capacity.   4. There were no ischemic changes by EKG during stress.   5. Post stress, normal left ventricular size, increased global systolic function.   6. Negative stress echo for ischemia.   7. The mitral valve is sclerotic, moderate mitral regurgitation.    Anesthesia Evaluation     . Pt has had prior anesthetic. Type: Regional    No history of anesthetic complications          ROS/MED  HX    ENT/Pulmonary:      (-) asthma, COPD and sleep apnea   Neurologic:      (-) seizures and CVA   Cardiovascular: Comment: Recent minor troponin elevation this admit - likely significant tachycardia in the setting of LVH.  Negative stress echo test 2016, and had no RWMAs on echo this admit.  Pt continues to complain of intermittent chest pain, but very confused as well.  Cleared for ECT.      (+) hypertension----. : . . . :. valvular problems/murmurs type: MR 1+ MR:.      (-) angina, CAD, orthopnea/PND, syncope, arrhythmias, irregular heartbeat/palpitations and angina   METS/Exercise Tolerance:     Hematologic:         Musculoskeletal:   (+) arthritis, , , -       GI/Hepatic:     (+) GERD Asymptomatic on medication,      (-) liver disease   Renal/Genitourinary:      (-) renal disease   Endo:      (-) Type II DM, thyroid disease and chronic steroid usage   Psychiatric:     (+) psychiatric history anxiety, depression and other (comment) (OCD)      Infectious Disease:         Malignancy:         Other:                     Physical Exam      Airway   Mallampati: II  TM distance: >3 FB  Neck ROM: full    Dental   (+) chipped and missing    Cardiovascular   Rhythm and rate: regular and normal  (-) no murmur    Pulmonary    breath sounds clear to auscultation(-) no wheezes                        Anesthesia Plan      History & Physical Review  History and physical reviewed and following examination; no interval change.    ASA Status:  2 .    NPO Status:  > 8 hours    Plan for General and Other with Intravenous (Methohexital) induction.   PONV prophylaxis:  Ondansetron (or other 5HT-3)  Brevital 100  Sux 80    Recent minor troponin elevation - likely significant tachycardia in the setting of LVH.  Negative  Stress echo 2016, and had no RWMAs on echo this admit.  Pt continues to complain of intermittent chest pain, but very confused as well.  Cleared for ECT.  Will still avoid extreme/prolonged tachycardia/hypertension.        Postoperative Care  Postoperative pain management:  Multi-modal analgesia.      Consents  Anesthetic plan, risks, benefits and alternatives discussed with:  Patient..

## 2017-11-06 NOTE — PLAN OF CARE
Problem: Depressive Symptoms  Goal: Depressive Symptoms  Signs and symptoms of listed problems will be absent or manageable.   Outcome: No Change  Pt was present and withdrawn in SDU lounge entire shift. Watched the news and football game with daughter. Still endorses SI and HI towards daughter. Only speaks when prompted to. Slow to respond. Blunt/flat affect, appears depressed. Had several questions about ECT logistics, however this will be his 3rd treatment tomorrow.

## 2017-11-06 NOTE — PROCEDURES
[facility] ECT Procedure Note     Negro Aj [id number]   67 year old 1950     Patient Status: Inpatient    Allergies   Allergen Reactions     Seasonal Allergies        Weight:  163 lbs 4.8 oz    Patient Preparations: Other (comment)         Diagnosis:   Major depression       Indications for ECT:   Medications ineffective       Pause for the Cause:     Right patient Yes   Right procedure/laterality settings: Yes   Right diagnosis Yes          Intra-Procedure Documentation:     Date:  11/6/2017  Time:  6:41 AM    ECT #    Treatment number this series: 3   Total treatment number: 3   Type of ECT:  Bilateral, standard    ECT Medications administered: Brevital: 100mg  Succinyl Choline: 80mg         Clinical Narrative:     ECT was administered by Thymatron machine.  Pt has been medically cleared for procedure, consent signed. Side effects, Risks and benefits reviewed.    ECT Strip Summary:   Energy Level: 60 percent  Motor Seizure Duration: 30 seconds  EEG Seizure Duration: 30 seconds    Complications: No    Plan: 4th ECT 11/6/17  Outpatient Psychiatrist: Dr Hurt

## 2017-11-06 NOTE — PLAN OF CARE
Problem: Depressive Symptoms  Goal: Depressive Symptoms  Signs and symptoms of listed problems will be absent or manageable.   Outcome: No Change  Pt stated that he is tired today. Pt had ECT this morning. Pt stated he had one suicidal thought this morning but has not had it since. Denies hallucinations. Denies pain. Pt participated in morning groups.

## 2017-11-07 PROCEDURE — A9270 NON-COVERED ITEM OR SERVICE: HCPCS | Mod: GY | Performed by: PSYCHIATRY & NEUROLOGY

## 2017-11-07 PROCEDURE — 90853 GROUP PSYCHOTHERAPY: CPT

## 2017-11-07 PROCEDURE — 25000132 ZZH RX MED GY IP 250 OP 250 PS 637: Mod: GY | Performed by: PSYCHIATRY & NEUROLOGY

## 2017-11-07 PROCEDURE — 12400006 ZZH R&B MH INTERMEDIATE

## 2017-11-07 PROCEDURE — 97150 GROUP THERAPEUTIC PROCEDURES: CPT | Mod: GO

## 2017-11-07 RX ORDER — QUETIAPINE FUMARATE 50 MG/1
50 TABLET, FILM COATED ORAL 2 TIMES DAILY
Status: DISCONTINUED | OUTPATIENT
Start: 2017-11-07 | End: 2017-11-14 | Stop reason: HOSPADM

## 2017-11-07 RX ADMIN — METOPROLOL TARTRATE 25 MG: 25 TABLET ORAL at 21:29

## 2017-11-07 RX ADMIN — OMEPRAZOLE 20 MG: 20 CAPSULE, DELAYED RELEASE ORAL at 09:30

## 2017-11-07 RX ADMIN — SIMVASTATIN 40 MG: 40 TABLET, FILM COATED ORAL at 21:29

## 2017-11-07 RX ADMIN — METOPROLOL TARTRATE 25 MG: 25 TABLET ORAL at 09:30

## 2017-11-07 RX ADMIN — ASPIRIN 81 MG: 81 TABLET, COATED ORAL at 09:30

## 2017-11-07 RX ADMIN — LISINOPRIL 10 MG: 10 TABLET ORAL at 09:29

## 2017-11-07 RX ADMIN — ESZOPICLONE 2 MG: 1 TABLET, FILM COATED ORAL at 21:28

## 2017-11-07 RX ADMIN — LORAZEPAM 0.5 MG: 0.5 TABLET ORAL at 14:39

## 2017-11-07 RX ADMIN — CLOMIPRAMINE HYDROCHLORIDE 100 MG: 50 CAPSULE ORAL at 21:29

## 2017-11-07 RX ADMIN — OLANZAPINE 10 MG: 10 TABLET, FILM COATED ORAL at 09:29

## 2017-11-07 RX ADMIN — LORAZEPAM 0.5 MG: 0.5 TABLET ORAL at 09:30

## 2017-11-07 RX ADMIN — QUETIAPINE FUMARATE 50 MG: 50 TABLET, FILM COATED ORAL at 21:29

## 2017-11-07 RX ADMIN — LORAZEPAM 0.5 MG: 0.5 TABLET ORAL at 18:48

## 2017-11-07 ASSESSMENT — ACTIVITIES OF DAILY LIVING (ADL)
GROOMING: INDEPENDENT;SHOWER
ORAL_HYGIENE: INDEPENDENT
DRESS: STREET CLOTHES
LAUNDRY: WITH SUPERVISION

## 2017-11-07 NOTE — PLAN OF CARE
"Problem: Depressive Symptoms  Goal: Depressive Symptoms  Signs and symptoms of listed problems will be absent or manageable.   Outcome: No Change  Patient appears brighter in affect and calmer in mood. He spent the majority of the shift in the lounge watching tv, he was observed socializing with peers. Patient reports he is, \"hanging in there,\" but did stated that today was, \"not the worst day I have had.\"       "

## 2017-11-07 NOTE — PLAN OF CARE
Problem: Depressive Symptoms  Goal: Depressive Symptoms  Signs and symptoms of listed problems will be absent or manageable.   Outcome: Improving  Patient presents with a more full range affect. He was visible and social on the unit. Patient attended groups and participated appropriately. He spent most of the shift in the SDU lounge interacting with peers and staff. Patient was significantly more independent throughout the shift. Patient showered and came up for medications without prompts. He has been cooperative and pleasant with staff and peers.

## 2017-11-07 NOTE — PLAN OF CARE
Problem: Depressive Symptoms  Goal: Depressive Symptoms  Signs and symptoms of listed problems will be absent or manageable.   Outcome: Improving  Pt improved compared to 10 days ago when l last saw him. Pt has been interacting with peers more while in the lounge. Pt states he is feeling better; also stated his interest in reading has returned. Pt reminded to be NPO after midnight for ECT in the morning.

## 2017-11-07 NOTE — PROGRESS NOTES
[facility] Psychiatric Progress Note      Interval History:   Pt seen, team meeting held with , nursing staff, OT, and PA's to review diagnosis and treatment plan.  Patient reports he slept better last night.  He reports less anxiety but continues to experience intrusive thoughts.  He has been more spontaneous and he denies any self-harm thoughts or plans.  Apart from dry mouth, he does not endorse any other medication side effects.  Staff report that he has been more spontaneous in groups and responds appropriately to questions.  He no longer displays evidence of catatonia.  He does complain of tiredness.  He was advised that olanzapine will be switched to quetiapine to alleviate the anxiety.  He verbalizes understanding and gave consent.    Review of systems:   The Review of Systems is negative other than noted in the HPI     Medications:       LORazepam  0.5 mg Oral 3 times daily     clomiPRAMINE (ANAFRANIL) capsule 100 mg  100 mg Oral At Bedtime     eszopiclone  2 mg Oral At Bedtime     OLANZapine  10 mg Oral BID     aspirin EC  81 mg Oral Daily     lisinopril  10 mg Oral Daily     metoprolol  25 mg Oral BID     omeprazole (priLOSEC) CR capsule 20 mg  20 mg Oral QAM AC     simvastatin  40 mg Oral Daily     lidocaine (buffered or not buffered), sodium chloride (PF), lidocaine (buffered or not buffered), sodium chloride (PF), hydrOXYzine, hypromellose-dextran, OLANZapine zydis    Mental Status Examination:     Appearance:  awake, alert, marginally groomed and casually dressed  Eye Contact:  better  Speech:  Sparse  Psychomotor Behavior:  no evidence of tardive dyskinesia, dystonia, or tics and fidgeting  Mood: Better   Affect: Mood-congruent with restricted range of affect  Thought Process:  Thought blocking with perseveration  Thought Content:  Admits to presence of suicidal ideation and homicidal ideation.  Obsessions persist albeit to a lesser degree.    Oriented to:  time, person,  and place  Attention Span and Concentration:  limited  Recent and Remote Memory:  limited  Fund of Knowledge: appropriate  Muscle Strength and Tone: normal  Gait and Station: Normal  Insight:  fair  Judgment:  limited          Labs/Vitals:     No results found for this or any previous visit (from the past 24 hour(s)).  B/P: 127/83, T: 97.5, P: 80, R: 17    Impression:   This is a 67-year-old gentleman with reported history of obsessive-compulsive disorder, though at this point seems markedly psychotic and internally preoccupied.  He is afflicted by his obsessions, though at this point it seems catatonic with unusual mannerisms, extreme paucity of thought and speech.  It does sound like he has had some improvement since starting Ativan yesterday and also resuming Zyprexa as apparently he was not taking his medications consistently prior to admission.       DIagnoses:     1.  Major depressive disorder with psychotic features, with catatonia, versus a primary psychotic disorder with catatonic features.   2.  Obsessive-compulsive disorder.   3.  Rule out bipolar spectrum disorder.   4.  Hypertension.   5.  Tachycardia.   6.  Elevated troponin.   7.  Deconditioning. .           Plan:   1. Written information given on medications. Side effects, risks, benefits reviewed.  2. Schedule ECT# 4 for 11/8/2017.  3.  Swap olanzapine for Seroquel at 50 mg b.i.d.  4. Continue hospitalization.    Attestation:  Patient has been seen and evaluated by Mateus keller MD    PATIENT ID  Name: Negro Aj  MRN:[id number]  YOB: 1950

## 2017-11-08 ENCOUNTER — ANESTHESIA (OUTPATIENT)
Dept: SURGERY | Facility: CLINIC | Age: 67
End: 2017-11-08

## 2017-11-08 ENCOUNTER — ANESTHESIA EVENT (OUTPATIENT)
Dept: SURGERY | Facility: CLINIC | Age: 67
End: 2017-11-08

## 2017-11-08 PROCEDURE — 97150 GROUP THERAPEUTIC PROCEDURES: CPT | Mod: GO

## 2017-11-08 PROCEDURE — 40000671 ZZH STATISTIC ANESTHESIA CASE

## 2017-11-08 PROCEDURE — 90870 ELECTROCONVULSIVE THERAPY: CPT

## 2017-11-08 PROCEDURE — A9270 NON-COVERED ITEM OR SERVICE: HCPCS | Mod: GY | Performed by: PSYCHIATRY & NEUROLOGY

## 2017-11-08 PROCEDURE — 25000128 H RX IP 250 OP 636: Performed by: ANESTHESIOLOGY

## 2017-11-08 PROCEDURE — 12400006 ZZH R&B MH INTERMEDIATE

## 2017-11-08 PROCEDURE — 25000132 ZZH RX MED GY IP 250 OP 250 PS 637: Mod: GY | Performed by: PSYCHIATRY & NEUROLOGY

## 2017-11-08 PROCEDURE — 25000125 ZZHC RX 250: Performed by: NURSE ANESTHETIST, CERTIFIED REGISTERED

## 2017-11-08 PROCEDURE — 25000128 H RX IP 250 OP 636: Performed by: NURSE ANESTHETIST, CERTIFIED REGISTERED

## 2017-11-08 PROCEDURE — 90853 GROUP PSYCHOTHERAPY: CPT

## 2017-11-08 PROCEDURE — 25000125 ZZHC RX 250: Performed by: ANESTHESIOLOGY

## 2017-11-08 RX ORDER — ALBUTEROL SULFATE 0.83 MG/ML
2.5 SOLUTION RESPIRATORY (INHALATION) EVERY 4 HOURS PRN
Status: DISCONTINUED | OUTPATIENT
Start: 2017-11-08 | End: 2017-11-08 | Stop reason: HOSPADM

## 2017-11-08 RX ORDER — SODIUM CHLORIDE, SODIUM LACTATE, POTASSIUM CHLORIDE, CALCIUM CHLORIDE 600; 310; 30; 20 MG/100ML; MG/100ML; MG/100ML; MG/100ML
INJECTION, SOLUTION INTRAVENOUS CONTINUOUS
Status: DISCONTINUED | OUTPATIENT
Start: 2017-11-08 | End: 2017-11-08 | Stop reason: HOSPADM

## 2017-11-08 RX ORDER — ONDANSETRON 4 MG/1
4 TABLET, ORALLY DISINTEGRATING ORAL EVERY 30 MIN PRN
Status: DISCONTINUED | OUTPATIENT
Start: 2017-11-08 | End: 2017-11-08 | Stop reason: HOSPADM

## 2017-11-08 RX ORDER — HYDRALAZINE HYDROCHLORIDE 20 MG/ML
2.5-5 INJECTION INTRAMUSCULAR; INTRAVENOUS EVERY 10 MIN PRN
Status: DISCONTINUED | OUTPATIENT
Start: 2017-11-08 | End: 2017-11-08 | Stop reason: HOSPADM

## 2017-11-08 RX ORDER — MEPERIDINE HYDROCHLORIDE 25 MG/ML
12.5 INJECTION INTRAMUSCULAR; INTRAVENOUS; SUBCUTANEOUS
Status: DISCONTINUED | OUTPATIENT
Start: 2017-11-08 | End: 2017-11-08 | Stop reason: HOSPADM

## 2017-11-08 RX ORDER — SODIUM CHLORIDE, SODIUM LACTATE, POTASSIUM CHLORIDE, CALCIUM CHLORIDE 600; 310; 30; 20 MG/100ML; MG/100ML; MG/100ML; MG/100ML
INJECTION, SOLUTION INTRAVENOUS CONTINUOUS
Status: DISCONTINUED | OUTPATIENT
Start: 2017-11-08 | End: 2017-11-14 | Stop reason: HOSPADM

## 2017-11-08 RX ORDER — LABETALOL HYDROCHLORIDE 5 MG/ML
10 INJECTION, SOLUTION INTRAVENOUS
Status: DISCONTINUED | OUTPATIENT
Start: 2017-11-08 | End: 2017-11-08 | Stop reason: HOSPADM

## 2017-11-08 RX ORDER — FENTANYL CITRATE 50 UG/ML
25-50 INJECTION, SOLUTION INTRAMUSCULAR; INTRAVENOUS
Status: DISCONTINUED | OUTPATIENT
Start: 2017-11-08 | End: 2017-11-08 | Stop reason: HOSPADM

## 2017-11-08 RX ORDER — ONDANSETRON 2 MG/ML
4 INJECTION INTRAMUSCULAR; INTRAVENOUS EVERY 30 MIN PRN
Status: DISCONTINUED | OUTPATIENT
Start: 2017-11-08 | End: 2017-11-08 | Stop reason: HOSPADM

## 2017-11-08 RX ORDER — HYDROMORPHONE HYDROCHLORIDE 1 MG/ML
.3-.5 INJECTION, SOLUTION INTRAMUSCULAR; INTRAVENOUS; SUBCUTANEOUS EVERY 10 MIN PRN
Status: DISCONTINUED | OUTPATIENT
Start: 2017-11-08 | End: 2017-11-08 | Stop reason: HOSPADM

## 2017-11-08 RX ORDER — NALOXONE HYDROCHLORIDE 0.4 MG/ML
.1-.4 INJECTION, SOLUTION INTRAMUSCULAR; INTRAVENOUS; SUBCUTANEOUS
Status: DISCONTINUED | OUTPATIENT
Start: 2017-11-08 | End: 2017-11-08 | Stop reason: HOSPADM

## 2017-11-08 RX ORDER — FENTANYL CITRATE 50 UG/ML
25-50 INJECTION, SOLUTION INTRAMUSCULAR; INTRAVENOUS
Status: CANCELLED | OUTPATIENT
Start: 2017-11-08

## 2017-11-08 RX ADMIN — QUETIAPINE FUMARATE 50 MG: 50 TABLET, FILM COATED ORAL at 21:20

## 2017-11-08 RX ADMIN — ESZOPICLONE 2 MG: 1 TABLET, FILM COATED ORAL at 21:20

## 2017-11-08 RX ADMIN — LORAZEPAM 0.5 MG: 0.5 TABLET ORAL at 08:45

## 2017-11-08 RX ADMIN — METOPROLOL TARTRATE 25 MG: 25 TABLET ORAL at 08:43

## 2017-11-08 RX ADMIN — SUCCINYLCHOLINE CHLORIDE 100 MG: 20 INJECTION, SOLUTION INTRAMUSCULAR; INTRAVENOUS at 07:03

## 2017-11-08 RX ADMIN — LISINOPRIL 10 MG: 10 TABLET ORAL at 08:43

## 2017-11-08 RX ADMIN — ASPIRIN 81 MG: 81 TABLET, COATED ORAL at 08:43

## 2017-11-08 RX ADMIN — QUETIAPINE FUMARATE 50 MG: 50 TABLET, FILM COATED ORAL at 08:48

## 2017-11-08 RX ADMIN — LORAZEPAM 0.5 MG: 0.5 TABLET ORAL at 14:40

## 2017-11-08 RX ADMIN — LORAZEPAM 0.5 MG: 0.5 TABLET ORAL at 19:28

## 2017-11-08 RX ADMIN — SIMVASTATIN 40 MG: 40 TABLET, FILM COATED ORAL at 21:20

## 2017-11-08 RX ADMIN — METOPROLOL TARTRATE 25 MG: 25 TABLET ORAL at 21:20

## 2017-11-08 RX ADMIN — CLOMIPRAMINE HYDROCHLORIDE 100 MG: 50 CAPSULE ORAL at 21:20

## 2017-11-08 RX ADMIN — SODIUM CHLORIDE, POTASSIUM CHLORIDE, SODIUM LACTATE AND CALCIUM CHLORIDE: 600; 310; 30; 20 INJECTION, SOLUTION INTRAVENOUS at 06:19

## 2017-11-08 RX ADMIN — LIDOCAINE HYDROCHLORIDE 1 ML: 10 INJECTION, SOLUTION EPIDURAL; INFILTRATION; INTRACAUDAL; PERINEURAL at 06:20

## 2017-11-08 RX ADMIN — METHOHEXITAL SODIUM 100 MG: 500 INJECTION, POWDER, LYOPHILIZED, FOR SOLUTION INTRAMUSCULAR; INTRAVENOUS; RECTAL at 07:03

## 2017-11-08 ASSESSMENT — ACTIVITIES OF DAILY LIVING (ADL)
DRESS: SCRUBS (BEHAVIORAL HEALTH)
ORAL_HYGIENE: INDEPENDENT
HYGIENE/GROOMING: INDEPENDENT

## 2017-11-08 NOTE — PLAN OF CARE
Problem: General Plan of Care (Inpatient Behavioral)  Goal: Discharge Planning  Patient attended Process Group on 11/6,11/7 and 11/8. Participation complete and appropriate. Patient seemed more alert ans focused on 11/7 and 11/8.

## 2017-11-08 NOTE — PLAN OF CARE
Problem: Depressive Symptoms  Goal: Depressive Symptoms  Signs and symptoms of listed problems will be absent or manageable.   Outcome: Improving  ECT #4 this morning. Present but withdrawn. Attended all groups without prompting. Social interactions improving. Observed reading in his room. Pleasant, polite, and cooperative.

## 2017-11-08 NOTE — ANESTHESIA CARE TRANSFER NOTE
Patient: Negro Aj    * No procedures listed *    Diagnosis: * No pre-op diagnosis entered *  Diagnosis Additional Information: No value filed.    Anesthesia Type:   General     Note:  Airway :Nasal Cannula  Patient transferred to:PACU  Handoff Report: Identifed the Patient, Identified the Reponsible Provider, Reviewed the pertinent medical history, Discussed the surgical course, Reviewed Intra-OP anesthesia mangement and issues during anesthesia, Set expectations for post-procedure period and Allowed opportunity for questions and acknowledgement of understanding      Vitals: (Last set prior to Anesthesia Care Transfer)    CRNA VITALS  11/8/2017 0642 - 11/8/2017 0713      11/8/2017             Pulse: 80    SpO2: 99 %    Resp Rate (observed): 18    Resp Rate (set):                 Electronically Signed By: CONCEPCIÓN Oglesby CRNA  November 8, 2017  7:13 AM

## 2017-11-08 NOTE — PROGRESS NOTES
[facility] Psychiatric Progress Note      Interval History:   Pt seen, team meeting held with , nursing staff, OT, and PA's to review diagnosis and treatment plan.  The patient had his fourth ECT treatment this morning.  He reports he is doing well.  He denies experiencing any headaches and jaw pain.  He states he is having some memory problems but otherwise doing better.  He is observed reading books this morning and staff report that they have observed improvement in his social interactions.  She does not endorse any self-harm thoughts or plans at this point.   Review of systems:   The Review of Systems is negative other than noted in the HPI     Medications:       QUEtiapine  50 mg Oral BID     LORazepam  0.5 mg Oral 3 times daily     clomiPRAMINE (ANAFRANIL) capsule 100 mg  100 mg Oral At Bedtime     eszopiclone  2 mg Oral At Bedtime     aspirin EC  81 mg Oral Daily     lisinopril  10 mg Oral Daily     metoprolol  25 mg Oral BID     omeprazole (priLOSEC) CR capsule 20 mg  20 mg Oral QAM AC     simvastatin  40 mg Oral Daily     sodium chloride (PF), lidocaine (buffered or not buffered), lidocaine (buffered or not buffered), sodium chloride (PF), lidocaine (buffered or not buffered), sodium chloride (PF), hydrOXYzine, hypromellose-dextran, OLANZapine zydis    Mental Status Examination:     Appearance:  awake, alert, marginally groomed and casually dressed  Eye Contact:  better  Speech:  Clear and coherent  Psychomotor Behavior:  no evidence of tardive dyskinesia, dystonia, or tics and fidgeting  Mood: Better   Affect: Mood-congruent with regular range of affect  Thought Process:  Thought blocking with perseveration  Thought Content:  Admits to presence of suicidal ideation and homicidal ideation.  Obsessions persist albeit to a lesser degree.    Oriented to:  time, person, and place  Attention Span and Concentration:  limited  Recent and Remote Memory:  limited  Fund of Knowledge:  appropriate  Muscle Strength and Tone: normal  Gait and Station: Normal  Insight:  fair  Judgment:  limited          Labs/Vitals:     No results found for this or any previous visit (from the past 24 hour(s)).  B/P: 127/83, T: 97.5, P: 80, R: 17    Impression:   This is a 67-year-old gentleman with reported history of obsessive-compulsive disorder, though at this point seems markedly psychotic and internally preoccupied.  He is afflicted by his obsessions, though at this point it seems catatonic with unusual mannerisms, extreme paucity of thought and speech.  It does sound like he has had some improvement since starting Ativan yesterday and also resuming Zyprexa as apparently he was not taking his medications consistently prior to admission.       DIagnoses:     1.  Major depressive disorder with psychotic features, with catatonia, versus a primary psychotic disorder with catatonic features.   2.  Obsessive-compulsive disorder.   3.  Rule out bipolar spectrum disorder.   4.  Hypertension.   5.  Tachycardia.   6.  Elevated troponin.   7.  Deconditioning. .           Plan:   1. Written information given on medications. Side effects, risks, benefits reviewed.  2. Schedule ECT# 5 for 11/10/2017.  3. Continue hospitalization.    Attestation:  Patient has been seen and evaluated by me,  Mateus Hurt MD    PATIENT ID  Name: Negro Aj  MRN:[id number]  YOB: 1950

## 2017-11-08 NOTE — ANESTHESIA PREPROCEDURE EVALUATION
Anesthesia Evaluation     . Pt has had prior anesthetic.     No history of anesthetic complications          ROS/MED HX    ENT/Pulmonary:      (-) sleep apnea   Neurologic:       Cardiovascular: Comment: Atypical chest pain  2016 stress test - no ischemia    (+) hypertension----. : . . . :. .       METS/Exercise Tolerance:     Hematologic:         Musculoskeletal:   (+) arthritis, , , -       GI/Hepatic:        (-) GERD   Renal/Genitourinary:         Endo:         Psychiatric:     (+) psychiatric history anxiety and depression      Infectious Disease:         Malignancy:         Other:                     Physical Exam  Normal systems: dental    Airway     Dental     Cardiovascular   Rhythm and rate: regular and normal      Pulmonary    breath sounds clear to auscultation                        Anesthesia Plan      History & Physical Review  History and physical reviewed and following examination; no interval change.    ASA Status:  2 .    NPO Status:  > 8 hours    Plan for General with Intravenous induction.     Brevital 90 and succinylcholine 100 last time      Postoperative Care      Consents  Anesthetic plan, risks, benefits and alternatives discussed with:  Patient..                          .  Anesthesia Evaluation     . Pt has had prior anesthetic. Type: Regional    No history of anesthetic complications          ROS/MED HX    ENT/Pulmonary:      (-) asthma, COPD and sleep apnea   Neurologic:      (-) seizures and CVA   Cardiovascular: Comment: Recent minor troponin elevation this admit - likely significant tachycardia in the setting of LVH.  Negative stress echo test 2016, and had no RWMAs on echo this admit.  Pt continues to complain of intermittent chest pain, but very confused as well.  Cleared for ECT.      (+) hypertension----. : . . . :. valvular problems/murmurs type: MR 1+ MR:.      (-) angina, CAD, orthopnea/PND, syncope, arrhythmias, irregular heartbeat/palpitations and angina   METS/Exercise  Tolerance:     Hematologic:         Musculoskeletal:   (+) arthritis, , , -       GI/Hepatic:     (+) GERD Asymptomatic on medication,      (-) liver disease   Renal/Genitourinary:      (-) renal disease   Endo:      (-) Type II DM, thyroid disease and chronic steroid usage   Psychiatric:     (+) psychiatric history anxiety, depression and other (comment) (OCD)      Infectious Disease:         Malignancy:         Other:                     Physical Exam  Normal systems: pulmonary    Airway   Mallampati: II  TM distance: >3 FB  Neck ROM: full    Dental   (+) chipped    Cardiovascular   Rhythm and rate: regular and normal  (+) murmur       Pulmonary                     Anesthesia Plan      History & Physical Review  History and physical reviewed and following examination; no interval change.    ASA Status:  2 .    NPO Status:  > 8 hours    Plan for General with Intravenous induction.   PONV prophylaxis:  Ondansetron (or other 5HT-3)       Postoperative Care  Postoperative pain management:  IV analgesics.      Consents  Anesthetic plan, risks, benefits and alternatives discussed with:  Patient..                          .  Anesthesia Evaluation     . Pt has had prior anesthetic. Type: Regional    No history of anesthetic complications          ROS/MED HX    ENT/Pulmonary:      (-) asthma, COPD and sleep apnea   Neurologic:      (-) seizures and CVA   Cardiovascular: Comment: Recent minor troponin elevation this admit - likely significant tachycardia in the setting of LVH.  Negative stress echo test 2016, and had no RWMAs on echo this admit.  Pt continues to complain of intermittent chest pain, but very confused as well.  Cleared for ECT.      (+) hypertension----. : . . . :. valvular problems/murmurs type: MR 1+ MR:.      (-) angina, CAD, orthopnea/PND, syncope, arrhythmias, irregular heartbeat/palpitations and angina   METS/Exercise Tolerance:     Hematologic:         Musculoskeletal:   (+) arthritis, , , -        GI/Hepatic:     (+) GERD Asymptomatic on medication,      (-) liver disease   Renal/Genitourinary:      (-) renal disease   Endo:      (-) Type II DM, thyroid disease and chronic steroid usage   Psychiatric:     (+) psychiatric history anxiety, depression and other (comment) (OCD)      Infectious Disease:         Malignancy:         Other:                     Physical Exam  Normal systems: pulmonary    Airway   Mallampati: II  TM distance: >3 FB  Neck ROM: full    Dental   (+) chipped    Cardiovascular   Rhythm and rate: regular and normal  (+) murmur       Pulmonary                     Anesthesia Plan      History & Physical Review  History and physical reviewed and following examination; no interval change.    ASA Status:  2 .    NPO Status:  > 8 hours    Plan for General with Intravenous induction.   PONV prophylaxis:  Ondansetron (or other 5HT-3)       Postoperative Care  Postoperative pain management:  IV analgesics.      Consents  Anesthetic plan, risks, benefits and alternatives discussed with:  Patient..                          .  Procedure: ECT   Preop diagnosis: Depression     Allergies   Allergen Reactions     Seasonal Allergies      Past Medical History:   Diagnosis Date     Anxiety      Depression with anxiety      High cholesterol      Hypertension     newly treating in 2016     Knee pain, right      OCD (obsessive compulsive disorder)      Past Surgical History:   Procedure Laterality Date     ARTHROPLASTY KNEE Right 2/9/2016    Procedure: ARTHROPLASTY KNEE;  Surgeon: Tiera Conteh MD;  Location: [facility] OR     ARTHROSCOPY KNEE Right      Social History   Substance Use Topics     Smoking status: Never Smoker     Smokeless tobacco: Not on file     Alcohol use Yes      Comment: occasional     Prior to Admission medications    Medication Sig Start Date End Date Taking? Authorizing Provider   LORazepam (ATIVAN) 0.5 MG tablet Take 1 tablet (0.5 mg) by mouth 3 times daily 10/28/17  Yes Wong Alvarado,  MD   lisinopril (PRINIVIL/ZESTRIL) 10 MG tablet Take 1 tablet (10 mg) by mouth daily 10/28/17  Yes Wong Alvarado MD   TOPIRAMATE PO Take 150 mg by mouth At Bedtime   Yes Unknown, Entered By History   CLOMIPRAMINE HCL PO Take 50 mg by mouth At Bedtime   Yes Unknown, Entered By History   CLONAZEPAM PO Take 0.5 mg by mouth nightly as needed   Yes Unknown, Entered By History   SIMVASTATIN PO Take 40 mg by mouth daily   Yes Reported, Patient   Omeprazole (PRILOSEC PO) Take 20 mg by mouth every morning    Yes Reported, Patient   aspirin EC 81 MG EC tablet Take 1 tablet (81 mg) by mouth daily 10/28/17   Wong Alvarado MD   OLANZapine (ZYPREXA) 10 MG tablet Take 1 tablet (10 mg) by mouth At Bedtime (takes 2 x 10mg tablet) 10/28/17   Wong Alvarado MD   OLANZapine zydis (ZYPREXA) 5 MG ODT tab Take 1 tablet (5 mg) by mouth every 6 hours as needed for agitation 10/28/17   Wong Alvarado MD   metoprolol (LOPRESSOR) 25 MG tablet Take 1 tablet (25 mg) by mouth 2 times daily 10/28/17   Wong Alvarado MD   FLUoxetine HCl (PROZAC PO) Take 40 mg by mouth 2 times daily Pt takes 40 mg in am, then 3 hours later takes second 40 mg to = 80 mg/day    Unknown, Entered By History   hypromellose-dextran (ARTIFICAL TEARS) SOLN Place 1 drop into the right eye 4 times daily as needed for dry eyes 8/29/16   Kenyon Leong DO   order for DME Equipment being ordered: Walker Wheels () and Walker ()  Treatment Diagnosis: Impaired gait 2/12/16   Agustin Hall PA-C     Current Facility-Administered Medications Ordered in Epic   Medication Dose Route Frequency Last Rate Last Dose     sodium chloride (PF) 0.9% PF flush 3 mL  3 mL Intracatheter Q1H PRN         lidocaine 1 % 1 mL  1 mL Other Q1H PRN   1 mL at 11/08/17 0620     lactated ringers infusion   Intravenous Continuous 25 mL/hr at 11/08/17 0619       [Auto Hold] QUEtiapine (SEROquel) tablet 50 mg  50 mg Oral BID   50 mg at 11/07/17 2123     lidocaine 1 % 1  mL  1 mL Other Q1H PRN         sodium chloride (PF) 0.9% PF flush 3 mL  3 mL Intracatheter Q1H PRN         lactated ringers infusion  500 mL Intravenous Continuous         [Auto Hold] LORazepam (ATIVAN) tablet 0.5 mg  0.5 mg Oral 3 times daily   0.5 mg at 11/07/17 1848     [Auto Hold] clomiPRAMINE (ANAFRANIL) capsule 100 mg  100 mg Oral At Bedtime   100 mg at 11/07/17 2129     [Auto Hold] eszopiclone (LUNESTA) tablet 2 mg  2 mg Oral At Bedtime   2 mg at 11/07/17 2128     lidocaine 1 % 1 mL  1 mL Other Q1H PRN   1 mL at 11/03/17 0614     sodium chloride (PF) 0.9% PF flush 3 mL  3 mL Intracatheter Q1H PRN         lactated ringers infusion  500 mL Intravenous Continuous 25 mL/hr at 11/03/17 0615 500 mL at 11/03/17 0615     [Auto Hold] hydrOXYzine (ATARAX) tablet 25-50 mg  25-50 mg Oral Q4H PRN   25 mg at 11/04/17 1220     [Auto Hold] aspirin EC EC tablet 81 mg  81 mg Oral Daily   81 mg at 11/07/17 0930     [Auto Hold] hypromellose-dextran (ARTIFICAL TEARS) ophthalmic solution 1 drop  1 drop Right Eye 4x Daily PRN         [Auto Hold] lisinopril (PRINIVIL/ZESTRIL) tablet 10 mg  10 mg Oral Daily   10 mg at 11/07/17 0929     [Auto Hold] metoprolol (LOPRESSOR) tablet 25 mg  25 mg Oral BID   25 mg at 11/07/17 2129     [Auto Hold] OLANZapine zydis (zyPREXA) ODT tab 5 mg  5 mg Oral Q6H PRN   5 mg at 10/29/17 1038     [Auto Hold] omeprazole (priLOSEC) CR capsule 20 mg  20 mg Oral QAM AC   20 mg at 11/07/17 0930     [Auto Hold] simvastatin (ZOCOR) tablet 40 mg  40 mg Oral Daily   40 mg at 11/07/17 2129     No current Epic-ordered outpatient prescriptions on file.       Wt Readings from Last 1 Encounters:   11/07/17 80.4 kg (177 lb 3.2 oz)     Temp Readings from Last 1 Encounters:   11/08/17 36.7  C (98.1  F) (Oral)     BP Readings from Last 6 Encounters:   11/08/17 133/88   10/28/17 126/84   08/17/17 (!) 164/109   08/29/16 (!) 180/106   02/12/16 142/78   05/16/15 142/54     Pulse Readings from Last 4 Encounters:   11/08/17 77    08/17/17 121   08/29/16 103   02/12/16 108     Resp Readings from Last 1 Encounters:   11/08/17 16     SpO2 Readings from Last 1 Encounters:   11/06/17 96%     Recent Labs   Lab Test  10/28/17   0550  10/28/17   0020  02/09/16   1156   NA   --   140  142   POTASSIUM  4.0  3.3*  3.9   CHLORIDE   --   106  108   CO2   --   25  27   ANIONGAP   --   9  7   GLC   --   145*  105*   BUN   --   24  17   CR   --   0.94  0.82   MELBA   --   8.7  8.4*     Recent Labs   Lab Test  10/28/17   0020  02/04/13   0039   AST  33  40   ALT  50  43   ALKPHOS  86  85   BILITOTAL  0.5  0.5     Recent Labs   Lab Test  10/28/17   0020  02/11/16   0704   02/09/16   1156   WBC  9.9   --    --   5.0   HGB  14.2  10.3*   < >  12.9*   PLT  237   --    --   172    < > = values in this interval not displayed.     Recent Labs   Lab Test  02/09/16   1156   ABO  A   RH   Pos     Recent Labs   Lab Test  02/09/16   1156   INR  0.92   PTT  26      Recent Labs   Lab Test  10/30/17   1450  10/28/17   1222  10/28/17   0550   TROPI  0.024  0.090*  0.087*     RECENT LABS:     ECG: NSR, voltage criteria for LVH, diffuse T wave changes    ECHO: 10/2017  Interpretation Summary     The rhythm was supraventricular tachycardia.  Left ventricular systolic function is normal.  The visual ejection fraction is estimated at 60-65%.  There is moderate concentric left ventricular hypertrophy.  The left ventricle is normal in size.  The right ventricle is normal in structure, function and size.  The left atrium is moderately dilated.  There is mild (1+) mitral regurgitation.    STRESS ECHO: 2016  Final Impressions:   1. Maximum stress test with 95.1% of age predicted maximum heart rate achieved.   2. During stress exam the patient developed shortness of breath and leg pain/fatigue.   3. Poor exercise capacity.   4. There were no ischemic changes by EKG during stress.   5. Post stress, normal left ventricular size, increased global systolic function.   6. Negative stress  echo for ischemia.   7. The mitral valve is sclerotic, moderate mitral regurgitation.    Anesthesia Evaluation     . Pt has had prior anesthetic. Type: Regional    No history of anesthetic complications          ROS/MED HX    ENT/Pulmonary:      (-) asthma, COPD and sleep apnea   Neurologic:      (-) seizures and CVA   Cardiovascular: Comment: Recent minor troponin elevation this admit - likely significant tachycardia in the setting of LVH.  Negative stress echo test 2016, and had no RWMAs on echo this admit.  Pt continues to complain of intermittent chest pain, but very confused as well.  Cleared for ECT.      (+) hypertension----. : . . . :. valvular problems/murmurs type: MR 1+ MR:.      (-) angina, CAD, orthopnea/PND, syncope, arrhythmias, irregular heartbeat/palpitations and angina   METS/Exercise Tolerance:     Hematologic:         Musculoskeletal:   (+) arthritis, , , -       GI/Hepatic:     (+) GERD Asymptomatic on medication,      (-) liver disease   Renal/Genitourinary:      (-) renal disease   Endo:      (-) Type II DM, thyroid disease and chronic steroid usage   Psychiatric:     (+) psychiatric history anxiety, depression and other (comment) (OCD)      Infectious Disease:         Malignancy:         Other:                     Physical Exam      Airway   Mallampati: II  TM distance: >3 FB  Neck ROM: full    Dental   (+) chipped and missing    Cardiovascular   Rhythm and rate: regular and normal  (-) no murmur    Pulmonary    breath sounds clear to auscultation(-) no wheezes                        Anesthesia Plan      History & Physical Review  History and physical reviewed and following examination; no interval change.    ASA Status:  2 .    NPO Status:  > 8 hours    Plan for General and Other with Intravenous (Methohexital) induction.   PONV prophylaxis:  Ondansetron (or other 5HT-3)  Brevital 100  Sux 80    Recent minor troponin elevation - likely significant tachycardia in the setting of LVH.  Negative   Stress echo 2016, and had no RWMAs on echo this admit.  Pt continues to complain of intermittent chest pain, but very confused as well.  Cleared for ECT.  Will still avoid extreme/prolonged tachycardia/hypertension.       Postoperative Care  Postoperative pain management:  Multi-modal analgesia.      Consents  Anesthetic plan, risks, benefits and alternatives discussed with:  Patient..

## 2017-11-08 NOTE — PROCEDURES
[facility] ECT Procedure Note     Negro Aj [id number]   67 year old 1950     Patient Status: Inpatient    Allergies   Allergen Reactions     Seasonal Allergies        Weight:  177 lbs 3.2 oz    Patient Preparations: Other (comment)         Diagnosis:   Major depression       Indications for ECT:   Medications ineffective       Pause for the Cause:     Right patient Yes   Right procedure/laterality settings: Yes   Right diagnosis Yes          Intra-Procedure Documentation:     Date:  11/8/2017  Time:  6:41 AM    ECT #    Treatment number this series: 4   Total treatment number: 4   Type of ECT:  Bilateral, standard    ECT Medications administered: Brevital: 100mg  Succinyl Choline: 80mg         Clinical Narrative:     ECT was administered by Thymatron machine.  Pt has been medically cleared for procedure, consent signed. Side effects, Risks and benefits reviewed.    ECT Strip Summary:   Energy Level: 65 percent  Motor Seizure Duration: 30 seconds  EEG Seizure Duration: 30 seconds    Complications: No    Plan: 5th ECT 11/10/17  Outpatient Psychiatrist: Dr Hurt

## 2017-11-09 PROCEDURE — 25000132 ZZH RX MED GY IP 250 OP 250 PS 637: Mod: GY | Performed by: PSYCHIATRY & NEUROLOGY

## 2017-11-09 PROCEDURE — 97150 GROUP THERAPEUTIC PROCEDURES: CPT | Mod: GO

## 2017-11-09 PROCEDURE — A9270 NON-COVERED ITEM OR SERVICE: HCPCS | Mod: GY | Performed by: PSYCHIATRY & NEUROLOGY

## 2017-11-09 PROCEDURE — 90853 GROUP PSYCHOTHERAPY: CPT

## 2017-11-09 PROCEDURE — 12400006 ZZH R&B MH INTERMEDIATE

## 2017-11-09 RX ORDER — CLOMIPRAMINE HYDROCHLORIDE 50 MG/1
150 CAPSULE ORAL AT BEDTIME
Status: DISCONTINUED | OUTPATIENT
Start: 2017-11-09 | End: 2017-11-14 | Stop reason: HOSPADM

## 2017-11-09 RX ORDER — LORAZEPAM 0.5 MG/1
0.5 TABLET ORAL 3 TIMES DAILY PRN
Status: DISCONTINUED | OUTPATIENT
Start: 2017-11-09 | End: 2017-11-14 | Stop reason: HOSPADM

## 2017-11-09 RX ADMIN — LORAZEPAM 0.5 MG: 0.5 TABLET ORAL at 07:51

## 2017-11-09 RX ADMIN — ESZOPICLONE 2 MG: 1 TABLET, FILM COATED ORAL at 21:07

## 2017-11-09 RX ADMIN — CLOMIPRAMINE HYDROCHLORIDE 150 MG: 50 CAPSULE ORAL at 21:07

## 2017-11-09 RX ADMIN — METOPROLOL TARTRATE 25 MG: 25 TABLET ORAL at 21:08

## 2017-11-09 RX ADMIN — SIMVASTATIN 40 MG: 40 TABLET, FILM COATED ORAL at 21:08

## 2017-11-09 RX ADMIN — QUETIAPINE FUMARATE 50 MG: 50 TABLET, FILM COATED ORAL at 21:08

## 2017-11-09 RX ADMIN — QUETIAPINE FUMARATE 50 MG: 50 TABLET, FILM COATED ORAL at 07:51

## 2017-11-09 RX ADMIN — LISINOPRIL 10 MG: 10 TABLET ORAL at 07:51

## 2017-11-09 RX ADMIN — METOPROLOL TARTRATE 25 MG: 25 TABLET ORAL at 07:51

## 2017-11-09 RX ADMIN — OMEPRAZOLE 20 MG: 20 CAPSULE, DELAYED RELEASE ORAL at 07:51

## 2017-11-09 RX ADMIN — ASPIRIN 81 MG: 81 TABLET, COATED ORAL at 07:51

## 2017-11-09 ASSESSMENT — ACTIVITIES OF DAILY LIVING (ADL)
LAUNDRY: WITH SUPERVISION
DRESS: STREET CLOTHES;INDEPENDENT;SCRUBS (BEHAVIORAL HEALTH)
GROOMING: INDEPENDENT;SHOWER
ORAL_HYGIENE: INDEPENDENT

## 2017-11-09 NOTE — PLAN OF CARE
Problem: Depressive Symptoms  Goal: Depressive Symptoms  Signs and symptoms of listed problems will be absent or manageable.   Patient presents with a more full range affect. He was visible and social on the unit. Patient attended groups and participated appropriately. He was slow to respond at times. Patient expressed to writer that he feels he has made improvement throuhg ECT. Patient's clomipramine was increased to 150 mg qhs and lorazepam changed to prn. Patient spent most of the shift between the gonzalez and his room reading and talking with roommate. Patient has been cooperative and pleasant with staff and peers.

## 2017-11-09 NOTE — PLAN OF CARE
Problem: General Plan of Care (Inpatient Behavioral)  Goal: Discharge Planning  Patient attended Process Group and participated appropriately. Patient struggled a bit during group discussion. He remains somewhat fixated on the topic of death and endorses still having thoughts of dying. Patient was encouraged to explore ways to stop his negative thoughts when he has them and to replace them with positive affirmations. Patient was also encouraged to look at his support system and identify people that he can talk to when he has self harm thoughts.

## 2017-11-09 NOTE — PROGRESS NOTES
[facility] Psychiatric Progress Note      Interval History:   Pt seen, team meeting held with , nursing staff, OT, and PA's to review diagnosis and treatment plan.  Staff report that patient is doing much better. He is participating in groups more appropriately and has been able to focus more on reading his book. Denies suicidal or homicidal ideation. Tolerating medications well without significant side effects. He stays in touch with his daughter regularly.      Review of systems:   The Review of Systems is negative other than noted in the HPI     Medications:       QUEtiapine  50 mg Oral BID     LORazepam  0.5 mg Oral 3 times daily     clomiPRAMINE (ANAFRANIL) capsule 100 mg  100 mg Oral At Bedtime     eszopiclone  2 mg Oral At Bedtime     aspirin EC  81 mg Oral Daily     lisinopril  10 mg Oral Daily     metoprolol  25 mg Oral BID     omeprazole (priLOSEC) CR capsule 20 mg  20 mg Oral QAM AC     simvastatin  40 mg Oral Daily     sodium chloride (PF), lidocaine (buffered or not buffered), lidocaine (buffered or not buffered), sodium chloride (PF), lidocaine (buffered or not buffered), sodium chloride (PF), hydrOXYzine, hypromellose-dextran, OLANZapine zydis    Mental Status Examination:     Appearance:  awake, alert, marginally groomed and casually dressed. More spontaneous.  Eye Contact:  better  Speech:  Clear and coherent  Psychomotor Behavior:  no evidence of tardive dyskinesia, dystonia, or tics and fidgeting  Mood: Better   Affect: Mood-congruent with regular range of affect  Thought Process:  Logical and linear  Thought Content:  Admits to presence of suicidal ideation and homicidal ideation.  Obsessions persist albeit to a much lesser degree.    Oriented to:  time, person, and place  Attention Span and Concentration:  Improved  Recent and Remote Memory:  Better  Fund of Knowledge: appropriate  Muscle Strength and Tone: normal  Gait and Station: Normal  Insight:  fair  Judgment:   limited          Labs/Vitals:     No results found for this or any previous visit (from the past 24 hour(s)).  B/P: 127/83, T: 97.5, P: 80, R: 17    Impression:   This is a 67-year-old gentleman with reported history of obsessive-compulsive disorder, though at this point seems markedly psychotic and internally preoccupied.  He is afflicted by his obsessions, though at this point it seems catatonic with unusual mannerisms, extreme paucity of thought and speech.  It does sound like he has had some improvement since starting Ativan yesterday and also resuming Zyprexa as apparently he was not taking his medications consistently prior to admission.       DIagnoses:     1.  Major depressive disorder with psychotic features, with catatonia, versus a primary psychotic disorder with catatonic features.   2.  Obsessive-compulsive disorder.   3.  Rule out bipolar spectrum disorder.   4.  Hypertension.   5.  Tachycardia.   6.  Elevated troponin.   7.  Deconditioning. .           Plan:   1. Written information given on medications. Side effects, risks, benefits reviewed.  2. Schedule ECT# 5 for 11/10/2017.  3. Increase clomipramine to 150 mg qhs and change lorazepam to prn.  4. Continue hospitalization.    Attestation:  Patient has been seen and evaluated by me,  Mateus Hurt MD    PATIENT ID  Name: Negro Aj  MRN:[id number]  YOB: 1950

## 2017-11-09 NOTE — PLAN OF CARE
"Problem: Depressive Symptoms  Goal: Depressive Symptoms  Signs and symptoms of listed problems will be absent or manageable.   Outcome: Improving  Patient spent most of his day in groups or reading. Affect is blunt/flat. More communicative with staff but doesn't brighten much on approach. Endorses no significant side effects from ECT (no headaches or pain, minor memory effects). Had ECT treatment 4 this morning. Hoping to see more effects from the treatment as it continues. Endorses no SI or HI. Concerned that his eyes \"feel a little puffy,\" that he might be having an allergic reaction. Nurse examined eyes and reports no matter or drainage, nothing too concerning. Patient states his day wasn't great because of \"all the changes\" (construction, patient lounge closed). States he is bored, but is enjoying the book he's reading. Glad to be able to concentrate well enough to read it. Attended all evening groups and participated well. Med-compliant.       "

## 2017-11-09 NOTE — PLAN OF CARE
Problem: General Rehab Plan of Care  Goal: Occupational Therapy Goals  The patient and/or their representative will achieve their patient-specific goals related to the plan of care.  The patient-specific goals include:  Pt will engage in group activities to enhance attention and social skills.   Pt participated in 2/3 OT groups today. Affect was mildly anxious. In Life Skills group, pt was alert and engaged but did not participate in the group discussion. He did participate in the group mindfulness activity. Pt engaged fully in Exercise group today. Balance was poor, and pt needed to use the wall to stabilize himself. On several occasions, his movement was disorganized. For instance, while stretching his neck, pt bent at the waist as well. He struggled with verbal cues to correct. Pt struggled during multi-step instructions, often overlooking the demonstration of the movement.

## 2017-11-10 PROCEDURE — A9270 NON-COVERED ITEM OR SERVICE: HCPCS | Mod: GY | Performed by: PSYCHIATRY & NEUROLOGY

## 2017-11-10 PROCEDURE — 90870 ELECTROCONVULSIVE THERAPY: CPT

## 2017-11-10 PROCEDURE — 25000132 ZZH RX MED GY IP 250 OP 250 PS 637: Mod: GY | Performed by: PSYCHIATRY & NEUROLOGY

## 2017-11-10 PROCEDURE — 25000125 ZZHC RX 250: Performed by: NURSE ANESTHETIST, CERTIFIED REGISTERED

## 2017-11-10 PROCEDURE — 37000008 ZZH ANESTHESIA TECHNICAL FEE, 1ST 30 MIN

## 2017-11-10 PROCEDURE — 40000010 ZZH STATISTIC ANES STAT CODE-CRNA PER MINUTE

## 2017-11-10 PROCEDURE — 25000128 H RX IP 250 OP 636: Performed by: NURSE ANESTHETIST, CERTIFIED REGISTERED

## 2017-11-10 PROCEDURE — 90853 GROUP PSYCHOTHERAPY: CPT

## 2017-11-10 PROCEDURE — 25000128 H RX IP 250 OP 636: Performed by: ANESTHESIOLOGY

## 2017-11-10 PROCEDURE — 12400006 ZZH R&B MH INTERMEDIATE

## 2017-11-10 RX ORDER — HYDRALAZINE HYDROCHLORIDE 20 MG/ML
2.5-5 INJECTION INTRAMUSCULAR; INTRAVENOUS EVERY 10 MIN PRN
Status: DISCONTINUED | OUTPATIENT
Start: 2017-11-10 | End: 2017-11-10 | Stop reason: HOSPADM

## 2017-11-10 RX ORDER — ONDANSETRON 2 MG/ML
4 INJECTION INTRAMUSCULAR; INTRAVENOUS EVERY 30 MIN PRN
Status: DISCONTINUED | OUTPATIENT
Start: 2017-11-10 | End: 2017-11-10 | Stop reason: HOSPADM

## 2017-11-10 RX ORDER — LABETALOL HYDROCHLORIDE 5 MG/ML
10 INJECTION, SOLUTION INTRAVENOUS ONCE
Status: COMPLETED | OUTPATIENT
Start: 2017-11-10 | End: 2017-11-10

## 2017-11-10 RX ORDER — LABETALOL HYDROCHLORIDE 5 MG/ML
10 INJECTION, SOLUTION INTRAVENOUS
Status: DISCONTINUED | OUTPATIENT
Start: 2017-11-10 | End: 2017-11-10 | Stop reason: HOSPADM

## 2017-11-10 RX ORDER — SODIUM CHLORIDE, SODIUM LACTATE, POTASSIUM CHLORIDE, CALCIUM CHLORIDE 600; 310; 30; 20 MG/100ML; MG/100ML; MG/100ML; MG/100ML
INJECTION, SOLUTION INTRAVENOUS CONTINUOUS
Status: DISCONTINUED | OUTPATIENT
Start: 2017-11-10 | End: 2017-11-10 | Stop reason: HOSPADM

## 2017-11-10 RX ORDER — MEPERIDINE HYDROCHLORIDE 25 MG/ML
12.5 INJECTION INTRAMUSCULAR; INTRAVENOUS; SUBCUTANEOUS EVERY 5 MIN PRN
Status: DISCONTINUED | OUTPATIENT
Start: 2017-11-10 | End: 2017-11-10 | Stop reason: HOSPADM

## 2017-11-10 RX ORDER — HYDROMORPHONE HYDROCHLORIDE 1 MG/ML
.3-.5 INJECTION, SOLUTION INTRAMUSCULAR; INTRAVENOUS; SUBCUTANEOUS EVERY 5 MIN PRN
Status: DISCONTINUED | OUTPATIENT
Start: 2017-11-10 | End: 2017-11-10 | Stop reason: HOSPADM

## 2017-11-10 RX ORDER — ALBUTEROL SULFATE 0.83 MG/ML
2.5 SOLUTION RESPIRATORY (INHALATION) EVERY 4 HOURS PRN
Status: DISCONTINUED | OUTPATIENT
Start: 2017-11-10 | End: 2017-11-10 | Stop reason: HOSPADM

## 2017-11-10 RX ORDER — ONDANSETRON 4 MG/1
4 TABLET, ORALLY DISINTEGRATING ORAL EVERY 30 MIN PRN
Status: DISCONTINUED | OUTPATIENT
Start: 2017-11-10 | End: 2017-11-10 | Stop reason: HOSPADM

## 2017-11-10 RX ORDER — FENTANYL CITRATE 50 UG/ML
25-50 INJECTION, SOLUTION INTRAMUSCULAR; INTRAVENOUS
Status: DISCONTINUED | OUTPATIENT
Start: 2017-11-10 | End: 2017-11-10 | Stop reason: HOSPADM

## 2017-11-10 RX ADMIN — METOPROLOL TARTRATE 25 MG: 25 TABLET ORAL at 08:43

## 2017-11-10 RX ADMIN — METOPROLOL TARTRATE 25 MG: 25 TABLET ORAL at 22:02

## 2017-11-10 RX ADMIN — ASPIRIN 81 MG: 81 TABLET, COATED ORAL at 08:43

## 2017-11-10 RX ADMIN — LABETALOL HYDROCHLORIDE 10 MG: 5 INJECTION, SOLUTION INTRAVENOUS at 08:20

## 2017-11-10 RX ADMIN — LISINOPRIL 10 MG: 10 TABLET ORAL at 08:43

## 2017-11-10 RX ADMIN — SODIUM CHLORIDE, POTASSIUM CHLORIDE, SODIUM LACTATE AND CALCIUM CHLORIDE: 600; 310; 30; 20 INJECTION, SOLUTION INTRAVENOUS at 06:20

## 2017-11-10 RX ADMIN — LIDOCAINE HYDROCHLORIDE 1 ML: 10 INJECTION, SOLUTION EPIDURAL; INFILTRATION; INTRACAUDAL; PERINEURAL at 06:20

## 2017-11-10 RX ADMIN — SIMVASTATIN 40 MG: 40 TABLET, FILM COATED ORAL at 22:02

## 2017-11-10 RX ADMIN — LABETALOL HYDROCHLORIDE 10 MG: 5 INJECTION, SOLUTION INTRAVENOUS at 08:06

## 2017-11-10 RX ADMIN — OMEPRAZOLE 20 MG: 20 CAPSULE, DELAYED RELEASE ORAL at 08:43

## 2017-11-10 RX ADMIN — ESZOPICLONE 2 MG: 1 TABLET, FILM COATED ORAL at 22:02

## 2017-11-10 RX ADMIN — CLOMIPRAMINE HYDROCHLORIDE 150 MG: 50 CAPSULE ORAL at 22:02

## 2017-11-10 RX ADMIN — QUETIAPINE FUMARATE 50 MG: 50 TABLET, FILM COATED ORAL at 08:43

## 2017-11-10 RX ADMIN — QUETIAPINE FUMARATE 50 MG: 50 TABLET, FILM COATED ORAL at 22:02

## 2017-11-10 ASSESSMENT — ACTIVITIES OF DAILY LIVING (ADL)
GROOMING: INDEPENDENT;SHOWER
LAUNDRY: WITH SUPERVISION
DRESS: STREET CLOTHES;INDEPENDENT
ORAL_HYGIENE: INDEPENDENT

## 2017-11-10 NOTE — PLAN OF CARE
Problem: Depressive Symptoms  Goal: Depressive Symptoms  Signs and symptoms of listed problems will be absent or manageable.   Outcome: No Change  Patient presents with a full range affect. He had ECT this morning and reports minor memory issues, that resolved near end of the shift. Patient was visible and social on the unit. He attended groups and participated appropriately. Patient feels that ECT has made a significant improvement in his mood. Patient was cooperative with medication and pleasant with staff and peers. Patient spent most of the shift in the gonzalez talking with roommate and other patients.

## 2017-11-10 NOTE — PROGRESS NOTES
0832hr - Pt transferred back to floor in w/chair by CNA. No needs/concerns noted/voiced at time of transfer back to floor.

## 2017-11-10 NOTE — PROGRESS NOTES
[facility] Psychiatric Progress Note      Interval History:   Pt seen, team meeting held with , nursing staff, OT, and PA's to review diagnosis and treatment plan. Patient continues to show evidence of improvement in mood and cognition. He says if he continues to do this well he'll be ready to go home after his last treatment on Monday. He says he has been in touch with his daughter daily and feels comfortable about returning home to her. Denies suicidal or homicidal ideation. Tolerating medications well without significant side effects.    Review of systems:   The Review of Systems is negative other than noted in the HPI     Medications:       clomiPRAMINE (ANAFRANIL) capsule 150 mg  150 mg Oral At Bedtime     QUEtiapine  50 mg Oral BID     eszopiclone  2 mg Oral At Bedtime     aspirin EC  81 mg Oral Daily     lisinopril  10 mg Oral Daily     metoprolol  25 mg Oral BID     omeprazole (priLOSEC) CR capsule 20 mg  20 mg Oral QAM AC     simvastatin  40 mg Oral Daily     LORazepam, sodium chloride (PF), lidocaine (buffered or not buffered), lidocaine (buffered or not buffered), sodium chloride (PF), lidocaine (buffered or not buffered), sodium chloride (PF), hydrOXYzine, hypromellose-dextran, OLANZapine zydis    Mental Status Examination:     Appearance:  awake, alert, marginally groomed and casually dressed. More animated today  Eye Contact:  better  Speech:  Clear and coherent. More spontaneous   Psychomotor Behavior:  no evidence of tardive dyskinesia, dystonia, or tics and fidgeting  Mood: Better   Affect: Mood-congruent with regular range of affect  Thought Process:  Logical and linear  Thought Content:  Admits to presence of suicidal ideation and homicidal ideation.  Obsessions persist albeit to a much lesser degree.    Oriented to:  time, person, and place  Attention Span and Concentration:  Improved  Recent and Remote Memory:  Better  Fund of Knowledge: appropriate  Muscle Strength  and Tone: normal  Gait and Station: Normal  Insight:  fair  Judgment:  limited          Labs/Vitals:     No results found for this or any previous visit (from the past 24 hour(s)).  B/P: 127/83, T: 97.5, P: 80, R: 17    Impression:   This is a 67-year-old gentleman with reported history of obsessive-compulsive disorder, though at this point seems markedly psychotic and internally preoccupied.  He is afflicted by his obsessions, though at this point it seems catatonic with unusual mannerisms, extreme paucity of thought and speech.  It does sound like he has had some improvement since starting Ativan yesterday and also resuming Zyprexa as apparently he was not taking his medications consistently prior to admission.       DIagnoses:     1.  Major depressive disorder with psychotic features, with catatonia, versus a primary psychotic disorder with catatonic features.   2.  Obsessive-compulsive disorder.   3.  Rule out bipolar spectrum disorder.   4.  Hypertension.   5.  Tachycardia.   6.  Elevated troponin.   7.  Deconditioning.       Plan:   1. Written information given on medications. Side effects, risks, benefits reviewed.  2. Schedule ECT# 6 for 11/13/2017.  3. He is interested in setting up aftercare either with Dr. Lul Draper at [facility] or with Dr. Annelise Hernandez ([phone]) in [place].   4. Continue hospitalization.    Attestation:  Patient has been seen and evaluated by me,  Mateus Hurt MD    PATIENT ID  Name: Negro Aj  MRN:[id number]  YOB: 1950

## 2017-11-10 NOTE — PROCEDURES
[facility] ECT Procedure Note     Negro Aj [id number]   67 year old 1950     Patient Status: Inpatient    Allergies   Allergen Reactions     Seasonal Allergies        Weight:  177 lbs 3.2 oz    Patient Preparations: Other (comment)         Diagnosis:   Major depression       Indications for ECT:   Medications ineffective       Pause for the Cause:     Right patient Yes   Right procedure/laterality settings: Yes   Right diagnosis Yes          Intra-Procedure Documentation:     Date:  11/10/2017  Time:  6:41 AM    ECT #    Treatment number this series: 5   Total treatment number: 5   Type of ECT:  Bilateral, standard    ECT Medications administered: Brevital: 100mg  Succinyl Choline: 80mg         Clinical Narrative:     ECT was administered by Thymatron machine.  Pt has been medically cleared for procedure, consent signed. Side effects, Risks and benefits reviewed.    ECT Strip Summary:   Energy Level: 70 percent  Motor Seizure Duration: 30 seconds  EEG Seizure Duration: 30 seconds    Complications: No    Plan: 6th ECT 11/13/17  Outpatient Psychiatrist: Dr Hurt

## 2017-11-10 NOTE — PLAN OF CARE
Problem: General Plan of Care (Inpatient Behavioral)  Goal: Discharge Planning  Patient attended Process Group and participated appropriately. Patient demonstrated emerging insight into the topic of discussion. Compared to last week, patient is much more spontaneous in his responses and able to grasp the content of group discussion much more readily.

## 2017-11-10 NOTE — PROGRESS NOTES
MDA Harper rounded on pt - BP now improved post 20m g IVP Labetalol - 152/96.  Ok for pt to transfer back to floor now per [facility] Harper.

## 2017-11-10 NOTE — PLAN OF CARE
Problem: Depressive Symptoms  Goal: Depressive Symptoms  Signs and symptoms of listed problems will be absent or manageable.   Outcome: Improving  Pt was more visible on the unit this shift. He spent time watching movies with peers. He reported that he cannot tell if ECT is helping, but is hopeful that he will notice improvement after his remaining treatments. He appears to be more animated-he initiates conversation, and shows more facial expressions. He visited with his daughter and reported that it went well. He did not attend any groups this evening.

## 2017-11-11 PROCEDURE — 12400006 ZZH R&B MH INTERMEDIATE

## 2017-11-11 PROCEDURE — A9270 NON-COVERED ITEM OR SERVICE: HCPCS | Mod: GY | Performed by: PSYCHIATRY & NEUROLOGY

## 2017-11-11 PROCEDURE — 25000132 ZZH RX MED GY IP 250 OP 250 PS 637: Mod: GY | Performed by: PSYCHIATRY & NEUROLOGY

## 2017-11-11 PROCEDURE — 90853 GROUP PSYCHOTHERAPY: CPT

## 2017-11-11 RX ADMIN — METOPROLOL TARTRATE 25 MG: 25 TABLET ORAL at 22:36

## 2017-11-11 RX ADMIN — ASPIRIN 81 MG: 81 TABLET, COATED ORAL at 08:56

## 2017-11-11 RX ADMIN — SIMVASTATIN 40 MG: 40 TABLET, FILM COATED ORAL at 22:36

## 2017-11-11 RX ADMIN — QUETIAPINE FUMARATE 50 MG: 50 TABLET, FILM COATED ORAL at 22:35

## 2017-11-11 RX ADMIN — LISINOPRIL 10 MG: 10 TABLET ORAL at 08:56

## 2017-11-11 RX ADMIN — ESZOPICLONE 2 MG: 1 TABLET, FILM COATED ORAL at 22:35

## 2017-11-11 RX ADMIN — QUETIAPINE FUMARATE 50 MG: 50 TABLET, FILM COATED ORAL at 08:56

## 2017-11-11 RX ADMIN — OMEPRAZOLE 20 MG: 20 CAPSULE, DELAYED RELEASE ORAL at 08:55

## 2017-11-11 RX ADMIN — CLOMIPRAMINE HYDROCHLORIDE 150 MG: 50 CAPSULE ORAL at 23:03

## 2017-11-11 RX ADMIN — METOPROLOL TARTRATE 25 MG: 25 TABLET ORAL at 08:56

## 2017-11-11 ASSESSMENT — ACTIVITIES OF DAILY LIVING (ADL)
GROOMING: INDEPENDENT
ORAL_HYGIENE: INDEPENDENT
DRESS: INDEPENDENT
LAUNDRY: WITH SUPERVISION

## 2017-11-11 NOTE — PLAN OF CARE
"Problem: Depressive Symptoms  Goal: Depressive Symptoms  Signs and symptoms of listed problems will be absent or manageable.   Outcome: No Change  Had ECT #5 today. Reports feeling overall better than upon admission, however, today he feels \"down.\" He reports slight STM loss, doesn't remember anything from the past few days or this morning. He states he spent some time in his room but doesn't recall if he slept at all. States he sleeps well at night. He spent time in the lounge watching tv, minimally social, however, appears more engaged in conversation, and is more animated. Pt attended wrap up group with proper participation.       "

## 2017-11-11 NOTE — PROGRESS NOTES
[facility] Psychiatric Progress Note      Interval History:   Pt seen on the stepdown unit.  He reports he continues to do well but claims he has blurred vision in his left eye which he claims is new.  He wonders if this is a side effect of his currently prescribed medications.  This was reviewed with him and he feels he needs to follow up with an eye doctor following discharge from the hospital.  He does not endorse any self-harm thoughts or plans.  He does not report any worsening of his intrusive thoughts.  He states he is better able to concentrate.  He is looking forward to discharging from the hospital.   Review of systems:   The Review of Systems is negative other than noted in the HPI     Medications:       clomiPRAMINE (ANAFRANIL) capsule 150 mg  150 mg Oral At Bedtime     QUEtiapine  50 mg Oral BID     eszopiclone  2 mg Oral At Bedtime     aspirin EC  81 mg Oral Daily     lisinopril  10 mg Oral Daily     metoprolol  25 mg Oral BID     omeprazole (priLOSEC) CR capsule 20 mg  20 mg Oral QAM AC     simvastatin  40 mg Oral Daily     LORazepam, sodium chloride (PF), lidocaine (buffered or not buffered), lidocaine (buffered or not buffered), sodium chloride (PF), lidocaine (buffered or not buffered), sodium chloride (PF), hydrOXYzine, hypromellose-dextran, OLANZapine zydis    Mental Status Examination:     Appearance:  awake, alert, marginally groomed and casually dressed. More animated today  Eye Contact:  better  Speech:  Clear and coherent. More spontaneous   Psychomotor Behavior:  no evidence of tardive dyskinesia, dystonia, or tics and fidgeting  Mood: Better   Affect: Mood-congruent with regular range of affect  Thought Process:  Logical and linear  Thought Content:  Admits to presence of suicidal ideation and homicidal ideation.    Oriented to:  time, person, and place  Attention Span and Concentration:  Improved  Recent and Remote Memory:  Better  Fund of Knowledge: appropriate  Muscle  Strength and Tone: normal  Gait and Station: Normal  Insight:  fair  Judgment:  limited          Labs/Vitals:     No results found for this or any previous visit (from the past 24 hour(s)).  B/P: 127/83, T: 97.5, P: 80, R: 17    Impression:   This is a 67-year-old gentleman with reported history of obsessive-compulsive disorder, though at this point seems markedly psychotic and internally preoccupied.  He is afflicted by his obsessions, though at this point it seems catatonic with unusual mannerisms, extreme paucity of thought and speech.  It does sound like he has had some improvement since starting Ativan yesterday and also resuming Zyprexa as apparently he was not taking his medications consistently prior to admission.       DIagnoses:     1.  Major depressive disorder with psychotic features, with catatonia, versus a primary psychotic disorder with catatonic features.   2.  Obsessive-compulsive disorder.   3.  Hypertension.          Plan:   1. Written information given on medications. Side effects, risks, benefits reviewed.  2. Schedule ECT# 6 for 11/13/2017.  3. Continue hospitalization.    Attestation:  Patient has been seen and evaluated by me,  Mateus Hurt MD    PATIENT ID  Name: Negro Aj  MRN:[id number]  YOB: 1950

## 2017-11-11 NOTE — PLAN OF CARE
"Problem: Depressive Symptoms  Goal: Depressive Symptoms  Signs and symptoms of listed problems will be absent or manageable.   Outcome: No Change  Full range affect, mood calm, reports feeling \"pretty good today\", he states that he is looking forward to his middle daughter visiting him. Spent time in the lounge watching tv, more social with peers, also spent time with writer talking about his grandkids. Attended morning groups, participated appropriately, and even asked for copy of \"5-steps to mental well-being\" handout used in Focus group today to keep for future reference      "

## 2017-11-12 PROCEDURE — 12400006 ZZH R&B MH INTERMEDIATE

## 2017-11-12 PROCEDURE — A9270 NON-COVERED ITEM OR SERVICE: HCPCS | Mod: GY | Performed by: PSYCHIATRY & NEUROLOGY

## 2017-11-12 PROCEDURE — 25000132 ZZH RX MED GY IP 250 OP 250 PS 637: Mod: GY | Performed by: PSYCHIATRY & NEUROLOGY

## 2017-11-12 RX ADMIN — METOPROLOL TARTRATE 25 MG: 25 TABLET ORAL at 22:11

## 2017-11-12 RX ADMIN — LISINOPRIL 10 MG: 10 TABLET ORAL at 08:43

## 2017-11-12 RX ADMIN — ESZOPICLONE 2 MG: 1 TABLET, FILM COATED ORAL at 22:11

## 2017-11-12 RX ADMIN — QUETIAPINE FUMARATE 50 MG: 50 TABLET, FILM COATED ORAL at 22:12

## 2017-11-12 RX ADMIN — CLOMIPRAMINE HYDROCHLORIDE 150 MG: 50 CAPSULE ORAL at 22:11

## 2017-11-12 RX ADMIN — OMEPRAZOLE 20 MG: 20 CAPSULE, DELAYED RELEASE ORAL at 08:43

## 2017-11-12 RX ADMIN — QUETIAPINE FUMARATE 50 MG: 50 TABLET, FILM COATED ORAL at 08:43

## 2017-11-12 RX ADMIN — ASPIRIN 81 MG: 81 TABLET, COATED ORAL at 08:43

## 2017-11-12 RX ADMIN — SIMVASTATIN 40 MG: 40 TABLET, FILM COATED ORAL at 22:12

## 2017-11-12 RX ADMIN — METOPROLOL TARTRATE 25 MG: 25 TABLET ORAL at 08:43

## 2017-11-12 ASSESSMENT — ACTIVITIES OF DAILY LIVING (ADL)
DRESS: INDEPENDENT
LAUNDRY: WITH SUPERVISION
GROOMING: INDEPENDENT
ORAL_HYGIENE: INDEPENDENT

## 2017-11-12 NOTE — PLAN OF CARE
"Problem: Depressive Symptoms  Goal: Depressive Symptoms  Signs and symptoms of listed problems will be absent or manageable.   Outcome: Improving  Visible all shift . Out in lounge watching sports. More conversational and social .Talked about his 3 daughters and the home he lives in  Hospitals in [place]. States \" I am feeling better. \"  Affect more full and more bright. Showing more interest and more spontaneous. Complained of some blurring in left eye that started today. Daughter Nela visited and was supportive . . Patient walked length of hallway back and forth at least 20 times with her.       "

## 2017-11-12 NOTE — PLAN OF CARE
Problem: Depressive Symptoms  Goal: Depressive Symptoms  Signs and symptoms of listed problems will be absent or manageable.   Outcome: Improving  Affect noticiably brighter, more socialable with peers, better able to hold a conversation. Visible in lounge, watching the football game. Attended all morning groups and actively participated. States that he is feeling better.

## 2017-11-13 ENCOUNTER — ANESTHESIA EVENT (OUTPATIENT)
Dept: SURGERY | Facility: CLINIC | Age: 67
End: 2017-11-13

## 2017-11-13 ENCOUNTER — ANESTHESIA (OUTPATIENT)
Dept: SURGERY | Facility: CLINIC | Age: 67
End: 2017-11-13

## 2017-11-13 PROCEDURE — 25000125 ZZHC RX 250: Performed by: ANESTHESIOLOGY

## 2017-11-13 PROCEDURE — A9270 NON-COVERED ITEM OR SERVICE: HCPCS | Mod: GY | Performed by: PSYCHIATRY & NEUROLOGY

## 2017-11-13 PROCEDURE — 25000132 ZZH RX MED GY IP 250 OP 250 PS 637: Mod: GY | Performed by: PSYCHIATRY & NEUROLOGY

## 2017-11-13 PROCEDURE — 12400006 ZZH R&B MH INTERMEDIATE

## 2017-11-13 PROCEDURE — 40000671 ZZH STATISTIC ANESTHESIA CASE

## 2017-11-13 PROCEDURE — 25000128 H RX IP 250 OP 636: Performed by: NURSE ANESTHETIST, CERTIFIED REGISTERED

## 2017-11-13 PROCEDURE — 25000125 ZZHC RX 250: Performed by: NURSE ANESTHETIST, CERTIFIED REGISTERED

## 2017-11-13 PROCEDURE — 25000128 H RX IP 250 OP 636: Performed by: ANESTHESIOLOGY

## 2017-11-13 PROCEDURE — 90870 ELECTROCONVULSIVE THERAPY: CPT

## 2017-11-13 PROCEDURE — 90853 GROUP PSYCHOTHERAPY: CPT

## 2017-11-13 PROCEDURE — GZB2ZZZ ELECTROCONVULSIVE THERAPY, BILATERAL-SINGLE SEIZURE: ICD-10-PCS | Performed by: PSYCHIATRY & NEUROLOGY

## 2017-11-13 RX ORDER — LABETALOL HYDROCHLORIDE 5 MG/ML
INJECTION, SOLUTION INTRAVENOUS PRN
Status: DISCONTINUED | OUTPATIENT
Start: 2017-11-13 | End: 2017-11-13

## 2017-11-13 RX ORDER — SODIUM CHLORIDE, SODIUM LACTATE, POTASSIUM CHLORIDE, CALCIUM CHLORIDE 600; 310; 30; 20 MG/100ML; MG/100ML; MG/100ML; MG/100ML
500 INJECTION, SOLUTION INTRAVENOUS CONTINUOUS
Status: DISCONTINUED | OUTPATIENT
Start: 2017-11-13 | End: 2017-11-14 | Stop reason: HOSPADM

## 2017-11-13 RX ADMIN — QUETIAPINE FUMARATE 50 MG: 50 TABLET, FILM COATED ORAL at 08:27

## 2017-11-13 RX ADMIN — ASPIRIN 81 MG: 81 TABLET, COATED ORAL at 08:27

## 2017-11-13 RX ADMIN — OMEPRAZOLE 20 MG: 20 CAPSULE, DELAYED RELEASE ORAL at 08:28

## 2017-11-13 RX ADMIN — ESZOPICLONE 2 MG: 1 TABLET, FILM COATED ORAL at 21:34

## 2017-11-13 RX ADMIN — LABETALOL HYDROCHLORIDE 10 MG: 5 INJECTION, SOLUTION INTRAVENOUS at 07:16

## 2017-11-13 RX ADMIN — METOPROLOL TARTRATE 25 MG: 25 TABLET ORAL at 21:34

## 2017-11-13 RX ADMIN — CLOMIPRAMINE HYDROCHLORIDE 150 MG: 50 CAPSULE ORAL at 21:34

## 2017-11-13 RX ADMIN — METOPROLOL TARTRATE 25 MG: 25 TABLET ORAL at 08:28

## 2017-11-13 RX ADMIN — SIMVASTATIN 40 MG: 40 TABLET, FILM COATED ORAL at 21:34

## 2017-11-13 RX ADMIN — LIDOCAINE HYDROCHLORIDE 1 ML: 10 INJECTION, SOLUTION EPIDURAL; INFILTRATION; INTRACAUDAL; PERINEURAL at 06:26

## 2017-11-13 RX ADMIN — LABETALOL HYDROCHLORIDE 15 MG: 5 INJECTION, SOLUTION INTRAVENOUS at 07:11

## 2017-11-13 RX ADMIN — SUCCINYLCHOLINE CHLORIDE 100 MG: 20 INJECTION, SOLUTION INTRAMUSCULAR; INTRAVENOUS at 07:03

## 2017-11-13 RX ADMIN — METHOHEXITAL SODIUM 80 MG: 500 INJECTION, POWDER, LYOPHILIZED, FOR SOLUTION INTRAMUSCULAR; INTRAVENOUS; RECTAL at 07:02

## 2017-11-13 RX ADMIN — LISINOPRIL 10 MG: 10 TABLET ORAL at 08:27

## 2017-11-13 RX ADMIN — SODIUM CHLORIDE, POTASSIUM CHLORIDE, SODIUM LACTATE AND CALCIUM CHLORIDE 500 ML: 600; 310; 30; 20 INJECTION, SOLUTION INTRAVENOUS at 06:25

## 2017-11-13 RX ADMIN — QUETIAPINE FUMARATE 50 MG: 50 TABLET, FILM COATED ORAL at 21:34

## 2017-11-13 NOTE — PLAN OF CARE
Problem: Depressive Symptoms  Goal: Depressive Symptoms  Signs and symptoms of listed problems will be absent or manageable.   Outcome: Improving  Patient had last ECT today.  He does not complain of any side effects from the ECT.  Bright affect and able to hold a conversation.  Spent the morning in bed sleeping.

## 2017-11-13 NOTE — PROGRESS NOTES
[facility] Psychiatric Progress Note      Interval History:   Pt seen on the stepdown unit.  He had his last ECT treatment this morning and he reports it went uneventfully. He denies any headaches or jaw pain. He says he feels a little tired but he'll like to discharge tomorrow. Attempts were made to refer him to [facility] but they do not accept his insurance. He wants to do CBT for OCD. Denies suicidal or homicidal ideation. Tolerating medications well without significant side effects.   Review of systems:   The Review of Systems is negative other than noted in the HPI     Medications:       clomiPRAMINE (ANAFRANIL) capsule 150 mg  150 mg Oral At Bedtime     QUEtiapine  50 mg Oral BID     eszopiclone  2 mg Oral At Bedtime     aspirin EC  81 mg Oral Daily     lisinopril  10 mg Oral Daily     metoprolol  25 mg Oral BID     omeprazole (priLOSEC) CR capsule 20 mg  20 mg Oral QAM AC     simvastatin  40 mg Oral Daily     lidocaine (buffered or not buffered), sodium chloride (PF), LORazepam, sodium chloride (PF), lidocaine (buffered or not buffered), lidocaine (buffered or not buffered), sodium chloride (PF), lidocaine (buffered or not buffered), sodium chloride (PF), hydrOXYzine, hypromellose-dextran, OLANZapine zydis    Mental Status Examination:     Appearance:  awake, alert, marginally groomed and casually dressed. More animated today  Eye Contact:  better  Speech:  Clear and coherent. More spontaneous   Psychomotor Behavior:  no evidence of tardive dyskinesia, dystonia, or tics and fidgeting  Mood: Brighter   Affect: Mood-congruent with regular range of affect  Thought Process:  Logical and linear  Thought Content:  Admits to presence of suicidal ideation and homicidal ideation.    Oriented to:  time, person, and place  Attention Span and Concentration:  Improved  Recent and Remote Memory:  Better  Fund of Knowledge: appropriate  Muscle Strength and Tone: normal  Gait and Station: Normal  Insight:   fair  Judgment:  limited          Labs/Vitals:     No results found for this or any previous visit (from the past 24 hour(s)).  B/P: 127/83, T: 97.5, P: 80, R: 17    Impression:   This is a 67-year-old gentleman with reported history of obsessive-compulsive disorder, though at this point seems markedly psychotic and internally preoccupied.  He is afflicted by his obsessions, though at this point it seems catatonic with unusual mannerisms, extreme paucity of thought and speech.  It does sound like he has had some improvement since starting Ativan yesterday and also resuming Zyprexa as apparently he was not taking his medications consistently prior to admission.       DIagnoses:     1.  Major depressive disorder with psychotic features, with catatonia, versus a primary psychotic disorder with catatonic features.   2.  Obsessive-compulsive disorder.   3.  Hypertension.          Plan:   1. Written information given on medications. Side effects, risks, benefits reviewed.  2. For discharge tomorrow.  3. Continue hospitalization.    Attestation:  Patient has been seen and evaluated by me,  Mateus Hurt MD    PATIENT ID  Name: Negro Aj  MRN:[id number]  YOB: 1950

## 2017-11-13 NOTE — PROCEDURES
[facility] ECT Procedure Note     Negro Aj [id number]   67 year old 1950     Patient Status: Inpatient    Allergies   Allergen Reactions     Seasonal Allergies        Weight:  177 lbs 3.2 oz    Patient Preparations: Other (comment)         Diagnosis:   Major depression       Indications for ECT:   Medications ineffective       Pause for the Cause:     Right patient Yes   Right procedure/laterality settings: Yes   Right diagnosis Yes          Intra-Procedure Documentation:     Date:  11/13/2017  Time:  6:41 AM    ECT #    Treatment number this series: 6   Total treatment number: 6   Type of ECT:  Bilateral, standard    ECT Medications administered: Brevital: 100mg  Succinyl Choline: 80mg         Clinical Narrative:     ECT was administered by Thymatron machine.  Pt has been medically cleared for procedure, consent signed. Side effects, Risks and benefits reviewed.    ECT Strip Summary:   Energy Level: 100 percent  Motor Seizure Duration: 30 seconds  EEG Seizure Duration: 30 seconds    Complications: No    Plan: done  Outpatient Psychiatrist: Dr Hurt

## 2017-11-13 NOTE — ANESTHESIA POSTPROCEDURE EVALUATION
Patient: Negro Aj    * No procedures listed *    Diagnosis:* No pre-op diagnosis entered *  Diagnosis Additional Information: No value filed.    Anesthesia Type:  General    Note:  Anesthesia Post Evaluation    Patient location during evaluation: PACU  Patient participation: Able to fully participate in evaluation  Level of consciousness: awake and alert  Pain management: adequate  Airway patency: patent  Cardiovascular status: acceptable  Respiratory status: acceptable  Hydration status: acceptable  PONV: none     Anesthetic complications: None          Last vitals:  Vitals:    11/13/17 0755 11/13/17 0810 11/13/17 1542   BP: 130/87  126/76   Pulse:   79   Resp: 27 24 16   Temp:   36.7  C (98  F)   SpO2: 90% 94%          Electronically Signed By: José Miguel Barker MD  November 13, 2017  5:03 PM

## 2017-11-13 NOTE — PROGRESS NOTES
Was unable to refer patient to Dr. Draper at [facility] for therapy as he does not take patient's insurance.  I left a voice mail for Dr. Annelise Hernandez, therapist that also specializes in OCD to see if she is a provider for patient's insurance and if she is accepting new patient's.  [phone]

## 2017-11-13 NOTE — PLAN OF CARE
Problem: Depressive Symptoms  Goal: Depressive Symptoms  Signs and symptoms of listed problems will be absent or manageable.   Outcome: Improving  Spent entire shift out in lounge watching football and later a movie . Was more animated and excited over football games. Appeared more spontaneous and outgoing. Smiling more , engaging more and brighter affect .States that he is less depressed.  Daughter Nela visited and this went well . They would like a family meeting Wednesday this week when another  Daughter of patients is returning from [place].

## 2017-11-14 VITALS
OXYGEN SATURATION: 94 % | BODY MASS INDEX: 26.62 KG/M2 | RESPIRATION RATE: 16 BRPM | TEMPERATURE: 97.7 F | DIASTOLIC BLOOD PRESSURE: 79 MMHG | SYSTOLIC BLOOD PRESSURE: 134 MMHG | WEIGHT: 179.7 LBS | HEART RATE: 60 BPM | HEIGHT: 69 IN

## 2017-11-14 PROCEDURE — 25000132 ZZH RX MED GY IP 250 OP 250 PS 637: Mod: GY | Performed by: PSYCHIATRY & NEUROLOGY

## 2017-11-14 PROCEDURE — A9270 NON-COVERED ITEM OR SERVICE: HCPCS | Mod: GY | Performed by: PSYCHIATRY & NEUROLOGY

## 2017-11-14 RX ORDER — CLOMIPRAMINE HYDROCHLORIDE 75 MG/1
150 CAPSULE ORAL AT BEDTIME
Qty: 60 CAPSULE | Refills: 0 | Status: ON HOLD | OUTPATIENT
Start: 2017-11-14 | End: 2024-08-03

## 2017-11-14 RX ORDER — ESZOPICLONE 2 MG/1
2 TABLET, FILM COATED ORAL AT BEDTIME
Qty: 30 TABLET | Refills: 0 | Status: SHIPPED | OUTPATIENT
Start: 2017-11-14 | End: 2017-12-14

## 2017-11-14 RX ORDER — LORAZEPAM 0.5 MG/1
0.5 TABLET ORAL 3 TIMES DAILY PRN
Qty: 60 TABLET | Refills: 0 | Status: ON HOLD | OUTPATIENT
Start: 2017-11-14 | End: 2024-08-10

## 2017-11-14 RX ORDER — QUETIAPINE FUMARATE 50 MG/1
50 TABLET, FILM COATED ORAL 2 TIMES DAILY
Qty: 60 TABLET | Refills: 0 | Status: SHIPPED | OUTPATIENT
Start: 2017-11-14 | End: 2024-09-18

## 2017-11-14 RX ADMIN — LISINOPRIL 10 MG: 10 TABLET ORAL at 08:52

## 2017-11-14 RX ADMIN — METOPROLOL TARTRATE 25 MG: 25 TABLET ORAL at 08:52

## 2017-11-14 RX ADMIN — QUETIAPINE FUMARATE 50 MG: 50 TABLET, FILM COATED ORAL at 08:52

## 2017-11-14 RX ADMIN — ASPIRIN 81 MG: 81 TABLET, COATED ORAL at 08:52

## 2017-11-14 RX ADMIN — OMEPRAZOLE 20 MG: 20 CAPSULE, DELAYED RELEASE ORAL at 08:52

## 2017-11-14 ASSESSMENT — ACTIVITIES OF DAILY LIVING (ADL)
ORAL_HYGIENE: INDEPENDENT
DRESS: STREET CLOTHES
LAUNDRY: WITH SUPERVISION
GROOMING: INDEPENDENT

## 2017-11-14 NOTE — PROGRESS NOTES
"Pt had some odd behavior during the night. He was startled during 3 of the rounds that he dramatically flung himself out of his bed onto the floor.  Pt was not harmed in any way.  Staff asked pt if he was okay, he replied \"I thought someone came into his room with a knife\".  Staff reassured him that no one had a knife.  Medication was offered to help pt get back to sleep, he declined.  Will continue to monitor.   "

## 2017-11-14 NOTE — PROGRESS NOTES
met with patient and went over safety plan with him. Patient agreeable to completing safety plan prior to discharge.

## 2017-11-14 NOTE — DISCHARGE INSTRUCTIONS
Behavioral Discharge Planning and Instructions    Summary:  Admitted with depression and psychosis    Main Diagnosis:   Major Depressive Disorder with psychotic features, with catatonia, versus a primary psychotic disorder with catatonic features; Obsessive-Compulsive Disorder; Rule Out Bipolar Spectrum Disorder.     Major Treatments, Procedures and Findings:  Psychiatric assessment. Medication adjustment. ECT.     Symptoms to Report: Increased confusion, Increasingly intrusive thoughts, Losing more sleep, Mood getting worse or Thoughts of harm to yourself or others.     Lifestyle Adjustment:  Follow all treatment recommendations. Develop and follow safety plan. Do not drive for at least one week following your last ECT treatment. If you have lingering memory issues or impaired judgment, do not drive until you have been cleared to do so by your physician.     Psychiatry Follow-up:     You have a follow up psychiatry appointment with Dr. Hurt at [facility] in [place] on Monday, November 27, 2017 at 4:40 pm.     [facility]  [address]  [place], [place]  [phone]  Fax [phone]    It is recommended that you follow up for therapy to address your obsessive compulsive disorder. A message was left Annelise Hernandez PhD, LP in [place] to determine if she accepts Medicare for insurance. If she does take Medicare, then you will be able to schedule an appointment with her. Please call her office and follow up on this after discharge.     Annelise Hernandez, PhD, LP  [address], [address]  [place] [address] [phone]    Resources:   Crisis Intervention: 540.723.2450 or 274-560-1462 (TTY: 342.390.7496).  Call anytime for help.  [place] [employer] Mental Illness (www.[place].org): 240.539.4374 or 207-488-6085.  National Suicide Prevention Line (www.mentalhealthmn.org): 206-529-JYUD (9403)  COPE (Crisis Services for Adults) in [facility]: 386.386.6071 (Available  24/7)    General Medication Instructions:   See your medication sheet(s) for instructions.   Take all medicines as directed.  Make no changes unless your doctor suggests them.   Go to all your doctor visits.  Be sure to have all your required lab tests. This way, your medicines can be refilled on time.  Do not use any drugs not prescribed by your doctor.  Avoid alcohol.

## 2017-11-14 NOTE — PLAN OF CARE
"Problem: Depressive Symptoms  Goal: Depressive Symptoms  Signs and symptoms of listed problems will be absent or manageable.   Outcome: No Change  Had 6th ECT this AM. Scheduled for D/C tomorrow. States he feels better from the ECT and reports he is \"probably\" ready to go home and would like to D/C soon. Spent most of the shift in the lounge. He attended wrap up group where he participated appropriately and was social with peers. He appears bright in affect. Patient displayed some STM loss.           "

## 2017-11-14 NOTE — PROGRESS NOTES
Discharge instructions reviewed with patient and his daughter including follow-up care plan. Educated on medication regime including review of name, dose, route, frequency, side effects, and next dose needed.  Advised not to stop prescribed medication without consulting their physician.  Explained that he cannot drive for at least 1 week following ECT.  Pt verbalized an understanding of instructions and teachings.  Copy of AVS given to patient. Coping skills and support network reviewed.  Denied SI. All belongings which where brought into the hospital have been returned to patient. Picked up by his daughter and D/C'd at 1825.

## 2017-11-14 NOTE — PLAN OF CARE
Problem: Depressive Symptoms  Goal: Depressive Symptoms  Signs and symptoms of listed problems will be absent or manageable.   Pt denies SI and HI and states he feels pretty good.  He states he worries about the thoughts returning and this writer encouraged him not to worry that the ECT has been helpful to remove those thoughts. His daughter is coming at 1800 to pick him up. Thirty day supply of medications in his locker to be sent home with patient

## 2017-11-24 NOTE — ANESTHESIA POSTPROCEDURE EVALUATION
Patient: Negro Aj    * No procedures listed *    Diagnosis:* No pre-op diagnosis entered *  Diagnosis Additional Information: No value filed.    Anesthesia Type:  General    Note:  Anesthesia Post Evaluation    Patient location during evaluation: PACU  Patient participation: Able to fully participate in evaluation  Level of consciousness: awake  Pain management: adequate  Airway patency: patent  Cardiovascular status: acceptable  Respiratory status: acceptable  Hydration status: acceptable  PONV: none     Anesthetic complications: None          Last vitals:  Vitals:    11/13/17 2134 11/14/17 0751 11/14/17 1500   BP: (!) 147/91 128/89 134/79   Pulse:  83 60   Resp:  15 16   Temp:  36.7  C (98.1  F) 36.5  C (97.7  F)   SpO2:            Electronically Signed By: Cierra Saleem MD, MD  November 24, 2017  6:35 AM

## 2017-11-27 NOTE — DISCHARGE SUMMARY
[facility]    Discharge Summary  Adult Psychiatry    Date of Admission:  10/28/2017  Date of Discharge:  11/14/2017  6:25 PM  Discharging Provider: aMteus Hurt MD  Date of Service (when I saw the patient): 11/14/17    Discharge Diagnoses   1.  Major depressive disorder, recurrent, severe with psychotic features, with catatonia.   2.  Obsessive-compulsive disorder.   3.  Deconditioning.    4.  Hypertension.   5.  Tachycardia.   6.  Elevated troponin.       History of Present Illness   Negro Aj is a 67-year-old  gentleman who lives with his daughter in [place], [place].  He has a psychiatric history of obsessive-compulsive disorder as well as an unspecified mood disorder with psychotic features versus schizoaffective disorder, with a history of 1 admission at [facility].  He has a medical history of sinus tachycardia, moderate left ventricular hypertrophy, mild mitral regurgitation, possible atrial fibrillation, elevated troponin (just completed hospitalization at [facility] for evaluation of his cardiac status), hypertension, hyperlipidemia.  He is admitted to [facility] inpatient psychiatric unit for further stabilization of his depression.  He was reportedly making homicidal statements about his daughters and suicidal statements about himself and was extremely agitated at the time of admission to the hospital.  He was started on Zyprexa and lorazepam and initially was hospitalized at [facility] to clear for cardiac status, given he had slightly elevated troponins at the time of presentation to the Emergency Department. For more details, I refer the reader to the initial psychiatric assessment documented on 10/29/2017 by Kaushik Cooper MD.    Hospital Course   Negro Aj was admitted on 10/28/2017 with reported history of obsessive-compulsive disorder, though at this point seems markedly psychotic and  "internally preoccupied.  He is afflicted by his obsessions, though at this point it seems catatonic with unusual mannerisms, extreme paucity of thought and speech.  It does sound like he has had some improvement since starting Ativan yesterday and also resuming Zyprexa as apparently he was not taking his medications consistently prior to admission.  He was transferred from the medical service where his discharge summary suggested that \"Troponin checked in ED due to tachycardia and some ectopy noted, mildly elevated at 0.055. Overall plateau in trend 0.087->0.090. No ischemic ST-T wave changes on EKG. Echocardiogram with moderate LVH, normal EF, no focal wall motion abnormalities. No known history of coronary artery disease, but risk factors include age, male, hypertension, hyperlipidemia. Suspect troponin elevation secondary to significant tachycardia in setting of LVH. Given his risk factors, may benefit from provocative cardiac testing to rule out underlying coronary artery disease, however this should be completed after his psychiatric status has been stabilized as he would be a poor candidate for invasive assessment and/or intervention until this has occurred. Recommend follow-up with his primary care provider after discharge to facilitate this. In the meantime, have optimized his medical management in the event he has underlying coronary artery disease by continuing his prior to admission lisinopril, added aspirin 81 mg daily and changed his HCTZ to metoprolol 25 mg BID. Continued on his prior to admission statin. Note his echocardiogram report indicated a rhythm of SVT. His telemetry had captured mostly sinus tachycardia with PACs, although with one short run of possible atrial fibrillation. He was in sinus rhythm at time of discharge to psychiatry unit. Poor candidate for anticoagulation until psychiatric issues addressed. Continued on metoprolol as above.\"  On admission to the mental health unit, he was " noted to be catatonic. He was offered lorazepam and clomipramine. He remained deeply depressed. He was thus encouraged to commence a series of ECT treatments. He was initially reluctant but after weighing his options he decided to move forward with it. He underwent a series of 6 treatments successfully while also escalating the dose of his clomipramine to 150 mg daily. He participated diligently in individual, milieu and group therapy. He quickly re-compensated and was able to discharge home after his 6th treatment. Today, Negro Aj reports absence of homicidal or suicidal ideations. In addition, he has notable risk factors for self-harm, including age, single status, anxiety and psychosis. However, risk is mitigated by commitment to family, Cheondoism beliefs, absence of past attempts, ability to volunteer a safety plan and history of seeking help when needed. Therefore, based on all available evidence including the factors cited above, he does not appear to be at imminent risk for self-harm, does not meet criteria for a 72-hr hold, and therefore remains appropriate for ongoing outpatient level of care. Voluntary referral for outpatient treatment was offered, he accepted this offer. He was referred for CBT with Dr. Annelise Hernandez PhD,         Mateus Hurt MD    Significant Results and Procedures   Please see below.     Unresulted Labs Ordered in the Past 30 Days of this Admission     No orders found from 8/29/2017 to 10/29/2017.          Code Status   Full Code    Primary Care Physician   Florentin Colby    Physical Exam   Appearance:  awake, alert, adequately groomed and casually dressed  Attitude:  cooperative  Eye Contact:  good  Mood:  Brighter  Affect:  appropriate and in normal range and mood congruent  Speech:  clear, coherent and normal prosody  Psychomotor Behavior:  no evidence of tardive dyskinesia, dystonia, or tics and intact station, gait and muscle tone  Thought Process:   logical, linear and goal oriented  Associations:  no loose associations  Thought Content:  no evidence of suicidal ideation or homicidal ideation and no evidence of psychotic thought  Insight:  good  Judgment:  intact  Oriented to:  time, person, and place  Attention Span and Concentration:  intact  Recent and Remote Memory:  intact  Language: Able to name objects and Able to repeat phrases  Fund of Knowledge: appropriate  Muscle Strength and Tone: normal  Gait and Station: Normal    Time Spent on this Encounter   IMateus, personally saw the patient today and spent greater than 30 minutes discharging this patient.    Discharge Disposition   Discharged to home  Condition at discharge: Stable    Psychiatry Follow-up:      You have a follow up psychiatry appointment with Dr. Hurt at [facility] in [place] on Monday, November 27, 2017 at 4:40 pm.      [facility]  [address]  [place], [place]  [phone]  Fax [phone]     It is recommended that you follow up for therapy to address your obsessive compulsive disorder. A message was left Annelise Hernandez PhD, LP in [place] to determine if she accepts Medicare for insurance. If she does take Medicare, then you will be able to schedule an appointment with her. Please call her office and follow up on this after discharge.      Annelise Hernandez, PhD, LP  [facility], [address]  [place] [address] [phone]      Consultations This Hospital Stay   HOSPITALIST IP CONSULT  HOSPITALIST IP CONSULT  SPIRITUAL HEALTH SERVICES IP CONSULT    Discharge Orders   No discharge procedures on file.  Discharge Medications   Discharge Medication List as of 11/14/2017  5:51 PM      START taking these medications    Details   QUEtiapine (SEROQUEL) 50 MG tablet Take 1 tablet (50 mg) by mouth 2 times daily, Disp-60 tablet, R-0, E-Prescribe      eszopiclone (LUNESTA) 2 MG tablet Take 1 tablet (2 mg) by mouth At Bedtime,  Disp-30 tablet, R-0, Local Print         CONTINUE these medications which have CHANGED    Details   LORazepam (ATIVAN) 0.5 MG tablet Take 1 tablet (0.5 mg) by mouth 3 times daily as needed for anxiety, Disp-60 tablet, R-0, Local Print      clomiPRAMINE (ANAFRANIL) 75 MG capsule Take 2 capsules (150 mg) by mouth At Bedtime, Disp-60 capsule, R-0, E-Prescribe         CONTINUE these medications which have NOT CHANGED    Details   lisinopril (PRINIVIL/ZESTRIL) 10 MG tablet Take 1 tablet (10 mg) by mouth daily, Disp-30 tablet, Transitional      SIMVASTATIN PO Take 40 mg by mouth daily, Historical      Omeprazole (PRILOSEC PO) Take 20 mg by mouth every morning , Historical      aspirin EC 81 MG EC tablet Take 1 tablet (81 mg) by mouth daily, Transitional      metoprolol (LOPRESSOR) 25 MG tablet Take 1 tablet (25 mg) by mouth 2 times daily, Disp-60 tablet, Transitional      hypromellose-dextran (ARTIFICAL TEARS) SOLN Place 1 drop into the right eye 4 times daily as needed for dry eyes, Disp-1 Bottle, R-1, Local Print      order for DME Equipment being ordered: Walker Wheels () and Walker ()  Treatment Diagnosis: Impaired gaitDisp-1 each, R-0, Local Print         STOP taking these medications       OLANZapine (ZYPREXA) 10 MG tablet Comments:   Reason for Stopping:         OLANZapine zydis (ZYPREXA) 5 MG ODT tab Comments:   Reason for Stopping:         FLUoxetine HCl (PROZAC PO) Comments:   Reason for Stopping:         TOPIRAMATE PO Comments:   Reason for Stopping:         CLONAZEPAM PO Comments:   Reason for Stopping:             Allergies   Allergies   Allergen Reactions     Seasonal Allergies      Data   Most Recent 3 CBC's:  Recent Labs   Lab Test  10/28/17   0020  02/11/16   0704  02/10/16   0641  02/09/16   1156  02/04/13   0039   WBC  9.9   --    --   5.0  6.5   HGB  14.2  10.3*  10.9*  12.9*  14.6   MCV  91   --    --   94  94   PLT  237   --    --   172  185      Most Recent 3 BMP's:  Recent Labs   Lab Test   10/28/17   0550  10/28/17   0020  02/09/16   1156  02/04/13   0039   NA   --   140  142  144   POTASSIUM  4.0  3.3*  3.9  3.8   CHLORIDE   --   106  108  104   CO2   --   25  27  28   BUN   --   24  17  19   CR   --   0.94  0.82  0.80   ANIONGAP   --   9  7  12   MELBA   --   8.7  8.4*  9.3   GLC   --   145*  105*  114*     Most Recent 2 LFT's:  Recent Labs   Lab Test  10/28/17   0020  02/04/13   0039   AST  33  40   ALT  50  43   ALKPHOS  86  85   BILITOTAL  0.5  0.5      Panel:No lab results found.  Most Recent 6 Bacteria Isolates From Any Culture (See EPIC Reports for Culture Details):No lab results found.  Most Recent TSH, T4 and A1c Labs:  Recent Labs   Lab Test  10/28/17   1222   TSH  0.85     Results for orders placed or performed during the hospital encounter of 02/03/13   MRI Brain w/o contrast    Narrative       MRI BRAIN W-O CONTRAST  2/15/2013 7:20 PM     HISTORY:  severe ocd,Anomia on neuropsych testing.   Eval for focal  lesions specifically vascular.,     TECHNIQUE: Multiplanar, multisequence MRI of the brain without  gadolinium IV contrast material.     COMPARISON: None.     FINDINGS:  The ventricles and sulci are normal in size. There are a  few nonspecific white matter hyperintensities. These are commonly  seen in this age group and are most consistent with small vessel  ischemic change..  There is no evidence of hemorrhage, mass, acute  infarct, or anomaly. The facial structures appear normal.  The  arteries at the base of the brain and the dural venous sinuses appear  patent.     IMPRESSION  1. No acute pathology. No bleed, mass, or acute infarcts are  identified.  2. Nonspecific white matter hyperintensities. In this age group, they  are probably due to small vessel ischemic change and part of the  normal aging process. Hypertensive patients or diabetic patients or  more likely to have these types of changes.     KAL BURLESON MD

## 2019-10-02 ENCOUNTER — HEALTH MAINTENANCE LETTER (OUTPATIENT)
Age: 69
End: 2019-10-02

## 2019-12-16 ENCOUNTER — HEALTH MAINTENANCE LETTER (OUTPATIENT)
Age: 69
End: 2019-12-16

## 2020-03-08 NOTE — ANESTHESIA PREPROCEDURE EVALUATION
Anesthesia Evaluation     . Pt has had prior anesthetic.     No history of anesthetic complications          ROS/MED HX    ENT/Pulmonary:      (-) sleep apnea   Neurologic:       Cardiovascular: Comment: Atypical chest pain  2016 stress test - no ischemia    (+) hypertension----. : . . . :. .      (-) CAD   METS/Exercise Tolerance:     Hematologic:         Musculoskeletal:   (+) arthritis, , , -       GI/Hepatic:        (-) GERD and liver disease   Renal/Genitourinary:      (-) renal disease   Endo:      (-) Type I DM and Type II DM   Psychiatric:     (+) psychiatric history anxiety and depression      Infectious Disease:         Malignancy:         Other:                     Physical Exam  Normal systems: pulmonary    Airway   Mallampati: II  TM distance: >3 FB  Neck ROM: full    Dental   (+) chipped    Cardiovascular   Rhythm and rate: regular and normal  (+) murmur       Pulmonary                     Anesthesia Plan      History & Physical Review  History and physical reviewed and following examination; no interval change.    ASA Status:  2 .    NPO Status:  > 8 hours    Plan for General with Intravenous induction.   PONV prophylaxis:  Ondansetron (or other 5HT-3)       Postoperative Care  Postoperative pain management:  IV analgesics.      Consents  Anesthetic plan, risks, benefits and alternatives discussed with:  Patient..                          .   Yes

## 2021-01-15 ENCOUNTER — HEALTH MAINTENANCE LETTER (OUTPATIENT)
Age: 71
End: 2021-01-15

## 2021-09-04 ENCOUNTER — HEALTH MAINTENANCE LETTER (OUTPATIENT)
Age: 71
End: 2021-09-04

## 2021-12-18 NOTE — ED NOTES
[facility]  ED Nurse Handoff Report    ED Chief complaint: Homicidal (Hx of OCD, and OCD has been restricting him from functioning at home.  States he has racing thoughts of killing himself and 2 daughters.) and Suicidal      ED Diagnosis:   Final diagnoses:   Homicidal ideation   Obsessive-compulsive disorder, unspecified type   Elevated troponin       Code Status: See MD note    Allergies:   Allergies   Allergen Reactions     Seasonal Allergies        Activity level - Baseline/Home:  Independent    Activity Level - Current:   Independent     Needed?: No    Isolation: No  Infection: Not Applicable    Bariatric?: No    Vital Signs:   Vitals:    10/27/17 2311 10/27/17 2331 10/28/17 0126   BP: (!) 158/111  (!) 179/110   Resp: 18     Temp: 97.6  F (36.4  C)     TempSrc: Temporal     SpO2: 97% 96% 97%   Weight: 73.5 kg (162 lb)         Cardiac Rhythm: ,        Pain level: 0-10 Pain Scale: 0    Is this patient confused?: Yes    Patient Report: Initial Complaint: Hx of OCD.  Daughter brought him in because she states he is not able to care for himself at home r/t increased agitation with OCD.  He is confused, not able to speak in sentences, dirty clothes, and states he has chronic thoughts of killing himself and both daughters.  He has a plan to stab himself and stab his daughters.  Tachy-daughter states he is always tachy.  Troponin elevated so plan to admit to medical. Fluids helped to decrease heart rate.  Zyprexa orally to decrease anxiety.  Focused Assessment: see above  Tests Performed: see above  Abnormal Results: see above  Treatments provided: see above    Family Comments: Daughter here and supportive    OBS brochure/video discussed/provided to patient: No    ED Medications:   Medications   0.9% sodium chloride BOLUS (1,000 mLs Intravenous New Bag 10/28/17 0040)     Followed by   0.9% sodium chloride infusion (1,000 mLs Intravenous New Bag 10/28/17 0129)   OLANZapine zydis (zyPREXA)  Patient presents with hallucinations. According to daughter, patient has recently been treated for UTI. Daughter states that the patient was on ertapenem. She states that over the past 2 days however she has developed hallucinations. Her mental status worsened over this time. She has been at her baseline mentation status. Daughter states that she has noticed that the patient has been talking to people that are not there. The history is provided by the patient and a relative. No  was used. Review of Systems   Constitutional: Negative for chills and fever. HENT: Negative for ear pain, sinus pressure and sore throat. Eyes: Negative for pain, discharge and redness. Respiratory: Negative for cough, shortness of breath and wheezing. Cardiovascular: Negative for chest pain. Gastrointestinal: Negative for abdominal distention, diarrhea, nausea and vomiting. Genitourinary: Negative for dysuria and frequency. Musculoskeletal: Negative for arthralgias and back pain. Skin: Negative for rash and wound. Neurological: Negative for weakness and headaches. Hematological: Negative for adenopathy. Psychiatric/Behavioral: Positive for hallucinations. All other systems reviewed and are negative. Physical Exam  Vitals and nursing note reviewed. Constitutional:       Appearance: She is well-developed. HENT:      Head: Normocephalic and atraumatic. Eyes:      Conjunctiva/sclera: Conjunctivae normal.   Cardiovascular:      Rate and Rhythm: Normal rate and regular rhythm. Heart sounds: Normal heart sounds. No murmur heard. Pulmonary:      Effort: Pulmonary effort is normal. No respiratory distress. Breath sounds: Normal breath sounds. No wheezing or rales. Abdominal:      General: Bowel sounds are normal.      Palpations: Abdomen is soft. Tenderness: There is no abdominal tenderness. There is no guarding or rebound.    Musculoskeletal: Cervical back: Normal range of motion and neck supple. Skin:     General: Skin is warm and dry. Neurological:      Mental Status: She is alert and oriented to person, place, and time. Cranial Nerves: No cranial nerve deficit. Coordination: Coordination normal.          Procedures     MDM  Number of Diagnoses or Management Options  Diagnosis management comments: Patient presents with altered mental status and hallucinations. Cultures were drawn. Patient was recently on ertapenem for UTI. Urinalysis was obtained which however shows a consistent UTI with 10-20 white blood cells, moderate bacteria, and leukocyte esterase. Patient was already on ertapenem. Patient started on meropenem. CBC showed patient's baseline anemia at 10.6. CMP was largely unremarkable. Initial troponin was 26. Repeat is pending. Chest x-ray did not show any obvious pneumonia. CT of the head did show old encephalomalacia but no acute intracranial abnormalities. Patient's pacemaker was attempted to be interrogated but was unsuccessful. She appears to be patient out appropriately at this time. Patient will be admitted for further evaluation and care of her altered mental status and UTI. Patient admitted. Amount and/or Complexity of Data Reviewed  Clinical lab tests: reviewed  Tests in the radiology section of CPT®: reviewed  Decide to obtain previous medical records or to obtain history from someone other than the patient: yes         ED Course as of 12/18/21 1957   Sat Dec 18, 2021   1730 EKG shows a ventricularly paced rhythm at 66 bpm.  Right axis. There are no obvious ST elevations however there are T wave inversions in the inferior leads. Paced rhythm is new compared to previous EKG on 1/8/2020. [BB]      ED Course User Index  [BB] Sajan Grubbs DO        ED Course as of 12/18/21 1957   Sat Dec 18, 2021   1730 EKG shows a ventricularly paced rhythm at 66 bpm.  Right axis.   There are no obvious ST elevations ODT tab 10 mg (10 mg Oral Given 10/28/17 0126)       Drips infusing?:  No      ED NURSE PHONE NUMBER: [id number]          Result Value Ref Range    Color, UA Yellow Straw/Yellow    Clarity, UA Clear Clear    Glucose, Ur Negative Negative mg/dL    Bilirubin Urine Negative Negative    Ketones, Urine Negative Negative mg/dL    Specific Gravity, UA 1.020 1.005 - 1.030    Blood, Urine TRACE-INTACT Negative    pH, UA 5.5 5.0 - 9.0    Protein, UA Negative Negative mg/dL    Urobilinogen, Urine 0.2 <2.0 E.U./dL    Nitrite, Urine Negative Negative    Leukocyte Esterase, Urine SMALL (A) Negative   Microscopic Urinalysis   Result Value Ref Range    WBC, UA 10-20 (A) 0 - 5 /HPF    RBC, UA NONE 0 - 2 /HPF    Epithelial Cells, UA NONE SEEN /HPF    Bacteria, UA MODERATE (A) None Seen /HPF   CBC Auto Differential   Result Value Ref Range    WBC 5.9 4.5 - 11.5 E9/L    RBC 3.50 3.50 - 5.50 E12/L    Hemoglobin 10.6 (L) 11.5 - 15.5 g/dL    Hematocrit 34.8 34.0 - 48.0 %    MCV 99.4 80.0 - 99.9 fL    MCH 30.3 26.0 - 35.0 pg    MCHC 30.5 (L) 32.0 - 34.5 %    RDW 15.1 (H) 11.5 - 15.0 fL    Platelets 097 (L) 607 - 450 E9/L    MPV 11.5 7.0 - 12.0 fL    Neutrophils % 60.4 43.0 - 80.0 %    Immature Granulocytes % 0.3 0.0 - 5.0 %    Lymphocytes % 25.7 20.0 - 42.0 %    Monocytes % 9.7 2.0 - 12.0 %    Eosinophils % 3.2 0.0 - 6.0 %    Basophils % 0.7 0.0 - 2.0 %    Neutrophils Absolute 3.55 1.80 - 7.30 E9/L    Immature Granulocytes # 0.02 E9/L    Lymphocytes Absolute 1.51 1.50 - 4.00 E9/L    Monocytes Absolute 0.57 0.10 - 0.95 E9/L    Eosinophils Absolute 0.19 0.05 - 0.50 E9/L    Basophils Absolute 0.04 0.00 - 0.20 E9/L   Comprehensive Metabolic Panel w/ Reflex to MG   Result Value Ref Range    Sodium 139 132 - 146 mmol/L    Potassium reflex Magnesium 4.0 3.5 - 5.0 mmol/L    Chloride 104 98 - 107 mmol/L    CO2 25 22 - 29 mmol/L    Anion Gap 10 7 - 16 mmol/L    Glucose 106 (H) 74 - 99 mg/dL    BUN 25 (H) 6 - 23 mg/dL    CREATININE 1.0 0.5 - 1.0 mg/dL    GFR Non-African American 52 >=60 mL/min/1.73    GFR African American >60     Calcium 8.9 8.6 - 10.2 mg/dL    Total Protein 6.8 6.4 - 8.3 g/dL    Albumin 3.7 3.5 - 5.2 g/dL    Total Bilirubin 0.4 0.0 - 1.2 mg/dL    Alkaline Phosphatase 119 (H) 35 - 104 U/L    ALT 13 0 - 32 U/L    AST 27 0 - 31 U/L   Troponin   Result Value Ref Range    Troponin, High Sensitivity 26 (H) 0 - 9 ng/L   SPECIMEN REJECTION   Result Value Ref Range    Rejected Test lacds     Reason for Rejection see below        RADIOLOGY:  CT Head WO Contrast   Final Result   1. No acute intracranial abnormality. 2.  Signs of deep white matter small vessel disease. 3.  Chronic small zone of encephalomalacia in the right cerebellum      RECOMMENDATIONS:   Unavailable         XR CHEST PORTABLE   Final Result   1. Significant interval improvement in congestive changes   2. Small left pleural effusion and or atelectasis, stable   3. Mild cardiomegaly, unchanged               ------------------------- NURSING NOTES AND VITALS REVIEWED ---------------------------  Date / Time Roomed:  12/18/2021  1:40 PM  ED Bed Assignment:  03/03    The nursing notes within the ED encounter and vital signs as below have been reviewed. Patient Vitals for the past 24 hrs:   BP Temp Temp src Pulse Resp SpO2 Height Weight   12/18/21 1344 (!) 151/67 98.4 °F (36.9 °C) Oral 84 16 94 % 5' 1\" (1.549 m) 122 lb (55.3 kg)       Oxygen Saturation Interpretation: Normal    ------------------------------------------ PROGRESS NOTES ------------------------------------------  Re-evaluation(s):  Time: 1587  Patients symptoms show no change  Repeat physical examination is not changed    Counseling:  I have spoken with the patient and discussed todays results, in addition to providing specific details for the plan of care and counseling regarding the diagnosis and prognosis. Their questions are answered at this time and they are agreeable with the plan of admission.    --------------------------------- ADDITIONAL PROVIDER NOTES ---------------------------------  Consultations:  Time: 1924.

## 2022-02-19 ENCOUNTER — HEALTH MAINTENANCE LETTER (OUTPATIENT)
Age: 72
End: 2022-02-19

## 2022-10-22 ENCOUNTER — HEALTH MAINTENANCE LETTER (OUTPATIENT)
Age: 72
End: 2022-10-22

## 2024-01-14 ENCOUNTER — HEALTH MAINTENANCE LETTER (OUTPATIENT)
Age: 74
End: 2024-01-14

## 2024-08-02 ENCOUNTER — HOSPITAL ENCOUNTER (INPATIENT)
Facility: CLINIC | Age: 74
LOS: 9 days | Discharge: NURSING, REHAB OR LONG-TERM CARE | DRG: 871 | End: 2024-08-11
Attending: STUDENT IN AN ORGANIZED HEALTH CARE EDUCATION/TRAINING PROGRAM | Admitting: INTERNAL MEDICINE
Payer: MEDICARE

## 2024-08-02 ENCOUNTER — APPOINTMENT (OUTPATIENT)
Dept: CT IMAGING | Facility: CLINIC | Age: 74
DRG: 871 | End: 2024-08-02
Attending: STUDENT IN AN ORGANIZED HEALTH CARE EDUCATION/TRAINING PROGRAM
Payer: MEDICARE

## 2024-08-02 DIAGNOSIS — R41.0 DELIRIUM: ICD-10-CM

## 2024-08-02 DIAGNOSIS — A41.9 SEPSIS, DUE TO UNSPECIFIED ORGANISM, UNSPECIFIED WHETHER ACUTE ORGAN DYSFUNCTION PRESENT (H): Primary | ICD-10-CM

## 2024-08-02 DIAGNOSIS — N17.9 AKI (ACUTE KIDNEY INJURY) (H): ICD-10-CM

## 2024-08-02 DIAGNOSIS — E87.1 HYPONATREMIA: ICD-10-CM

## 2024-08-02 PROBLEM — K21.9 ACID REFLUX: Status: ACTIVE | Noted: 2022-10-10

## 2024-08-02 PROBLEM — Z96.651 HISTORY OF RIGHT KNEE JOINT REPLACEMENT: Status: ACTIVE | Noted: 2019-09-11

## 2024-08-02 PROBLEM — G47.9 DIFFICULTY SLEEPING: Status: ACTIVE | Noted: 2024-08-02

## 2024-08-02 PROBLEM — H26.9 CATARACT: Status: ACTIVE | Noted: 2024-01-01

## 2024-08-02 PROBLEM — E11.9 DIABETES (H): Status: ACTIVE | Noted: 2019-12-01

## 2024-08-02 PROBLEM — I10 HYPERTENSION: Status: ACTIVE | Noted: 2022-10-10

## 2024-08-02 PROBLEM — E11.9 DIABETES MELLITUS TYPE 2, CONTROLLED, WITHOUT COMPLICATIONS (H): Status: ACTIVE | Noted: 2019-12-15

## 2024-08-02 PROBLEM — G25.81 RESTLESS LEGS SYNDROME: Status: ACTIVE | Noted: 2024-08-02

## 2024-08-02 PROBLEM — E78.5 HYPERLIPEMIA: Status: ACTIVE | Noted: 2022-10-10

## 2024-08-02 LAB
ALBUMIN SERPL BCG-MCNC: 4.4 G/DL (ref 3.5–5.2)
ALBUMIN UR-MCNC: 30 MG/DL
ALP SERPL-CCNC: 91 U/L (ref 40–150)
ALT SERPL W P-5'-P-CCNC: 57 U/L (ref 0–70)
ANION GAP SERPL CALCULATED.3IONS-SCNC: 13 MMOL/L (ref 7–15)
APPEARANCE UR: CLEAR
AST SERPL W P-5'-P-CCNC: 45 U/L (ref 0–45)
ATRIAL RATE - MUSE: 89 BPM
BASE EXCESS BLDV CALC-SCNC: 0 MMOL/L (ref -3–3)
BASOPHILS # BLD AUTO: 0 10E3/UL (ref 0–0.2)
BASOPHILS NFR BLD AUTO: 0 %
BILIRUB SERPL-MCNC: 1.3 MG/DL
BILIRUB UR QL STRIP: NEGATIVE
BUN SERPL-MCNC: 22.6 MG/DL (ref 8–23)
CALCIUM SERPL-MCNC: 8.5 MG/DL (ref 8.8–10.4)
CHLORIDE SERPL-SCNC: 91 MMOL/L (ref 98–107)
CK SERPL-CCNC: 323 U/L (ref 39–308)
COLOR UR AUTO: ABNORMAL
CREAT SERPL-MCNC: 1.45 MG/DL (ref 0.67–1.17)
DIASTOLIC BLOOD PRESSURE - MUSE: NORMAL MMHG
EGFRCR SERPLBLD CKD-EPI 2021: 51 ML/MIN/1.73M2
EOSINOPHIL # BLD AUTO: 0 10E3/UL (ref 0–0.7)
EOSINOPHIL NFR BLD AUTO: 0 %
ERYTHROCYTE [DISTWIDTH] IN BLOOD BY AUTOMATED COUNT: 13.9 % (ref 10–15)
ETHANOL SERPL-MCNC: <0.01 G/DL
FLUAV RNA SPEC QL NAA+PROBE: NEGATIVE
FLUBV RNA RESP QL NAA+PROBE: NEGATIVE
GLUCOSE SERPL-MCNC: 163 MG/DL (ref 70–99)
GLUCOSE UR STRIP-MCNC: 50 MG/DL
HCO3 BLDV-SCNC: 25 MMOL/L (ref 21–28)
HCO3 SERPL-SCNC: 25 MMOL/L (ref 22–29)
HCT VFR BLD AUTO: 35.8 % (ref 40–53)
HGB BLD-MCNC: 11.8 G/DL (ref 13.3–17.7)
HGB UR QL STRIP: ABNORMAL
HOLD SPECIMEN: NORMAL
HOLD SPECIMEN: NORMAL
IMM GRANULOCYTES # BLD: 0 10E3/UL
IMM GRANULOCYTES NFR BLD: 0 %
INTERPRETATION ECG - MUSE: NORMAL
KETONES UR STRIP-MCNC: NEGATIVE MG/DL
LACTATE BLD-SCNC: 1.4 MMOL/L
LEUKOCYTE ESTERASE UR QL STRIP: NEGATIVE
LYMPHOCYTES # BLD AUTO: 1.4 10E3/UL (ref 0.8–5.3)
LYMPHOCYTES NFR BLD AUTO: 15 %
MCH RBC QN AUTO: 29.8 PG (ref 26.5–33)
MCHC RBC AUTO-ENTMCNC: 33 G/DL (ref 31.5–36.5)
MCV RBC AUTO: 90 FL (ref 78–100)
MONOCYTES # BLD AUTO: 0.6 10E3/UL (ref 0–1.3)
MONOCYTES NFR BLD AUTO: 7 %
MUCOUS THREADS #/AREA URNS LPF: PRESENT /LPF
NEUTROPHILS # BLD AUTO: 7.3 10E3/UL (ref 1.6–8.3)
NEUTROPHILS NFR BLD AUTO: 78 %
NITRATE UR QL: NEGATIVE
NRBC # BLD AUTO: 0 10E3/UL
NRBC BLD AUTO-RTO: 0 /100
P AXIS - MUSE: 66 DEGREES
PCO2 BLDV: 38 MM HG (ref 40–50)
PH BLDV: 7.43 [PH] (ref 7.32–7.43)
PH UR STRIP: 6 [PH] (ref 5–7)
PLAT MORPH BLD: NORMAL
PLATELET # BLD AUTO: 138 10E3/UL (ref 150–450)
PO2 BLDV: 19 MM HG (ref 25–47)
POTASSIUM SERPL-SCNC: 3.9 MMOL/L (ref 3.4–5.3)
PR INTERVAL - MUSE: 136 MS
PROT SERPL-MCNC: 7.4 G/DL (ref 6.4–8.3)
QRS DURATION - MUSE: 74 MS
QT - MUSE: 340 MS
QTC - MUSE: 413 MS
R AXIS - MUSE: -49 DEGREES
RBC # BLD AUTO: 3.96 10E6/UL (ref 4.4–5.9)
RBC MORPH BLD: NORMAL
RBC URINE: 1 /HPF
RSV RNA SPEC NAA+PROBE: NEGATIVE
SAO2 % BLDV: 32 % (ref 70–75)
SARS-COV-2 RNA RESP QL NAA+PROBE: NEGATIVE
SODIUM SERPL-SCNC: 129 MMOL/L (ref 135–145)
SP GR UR STRIP: 1.02 (ref 1–1.03)
SYSTOLIC BLOOD PRESSURE - MUSE: NORMAL MMHG
T AXIS - MUSE: -4 DEGREES
UROBILINOGEN UR STRIP-MCNC: NORMAL MG/DL
VENTRICULAR RATE- MUSE: 89 BPM
WBC # BLD AUTO: 9.4 10E3/UL (ref 4–11)
WBC URINE: <1 /HPF

## 2024-08-02 PROCEDURE — 84100 ASSAY OF PHOSPHORUS: CPT | Performed by: STUDENT IN AN ORGANIZED HEALTH CARE EDUCATION/TRAINING PROGRAM

## 2024-08-02 PROCEDURE — 85379 FIBRIN DEGRADATION QUANT: CPT | Performed by: INTERNAL MEDICINE

## 2024-08-02 PROCEDURE — 250N000011 HC RX IP 250 OP 636: Performed by: STUDENT IN AN ORGANIZED HEALTH CARE EDUCATION/TRAINING PROGRAM

## 2024-08-02 PROCEDURE — 96375 TX/PRO/DX INJ NEW DRUG ADDON: CPT

## 2024-08-02 PROCEDURE — 96365 THER/PROPH/DIAG IV INF INIT: CPT

## 2024-08-02 PROCEDURE — 99223 1ST HOSP IP/OBS HIGH 75: CPT | Mod: AI | Performed by: INTERNAL MEDICINE

## 2024-08-02 PROCEDURE — 70450 CT HEAD/BRAIN W/O DYE: CPT | Mod: MA

## 2024-08-02 PROCEDURE — 84157 ASSAY OF PROTEIN OTHER: CPT | Performed by: STUDENT IN AN ORGANIZED HEALTH CARE EDUCATION/TRAINING PROGRAM

## 2024-08-02 PROCEDURE — 89051 BODY FLUID CELL COUNT: CPT | Performed by: STUDENT IN AN ORGANIZED HEALTH CARE EDUCATION/TRAINING PROGRAM

## 2024-08-02 PROCEDURE — 82077 ASSAY SPEC XCP UR&BREATH IA: CPT | Performed by: STUDENT IN AN ORGANIZED HEALTH CARE EDUCATION/TRAINING PROGRAM

## 2024-08-02 PROCEDURE — 258N000003 HC RX IP 258 OP 636: Performed by: STUDENT IN AN ORGANIZED HEALTH CARE EDUCATION/TRAINING PROGRAM

## 2024-08-02 PROCEDURE — 36415 COLL VENOUS BLD VENIPUNCTURE: CPT | Performed by: STUDENT IN AN ORGANIZED HEALTH CARE EDUCATION/TRAINING PROGRAM

## 2024-08-02 PROCEDURE — 87040 BLOOD CULTURE FOR BACTERIA: CPT | Performed by: STUDENT IN AN ORGANIZED HEALTH CARE EDUCATION/TRAINING PROGRAM

## 2024-08-02 PROCEDURE — 86618 LYME DISEASE ANTIBODY: CPT | Performed by: INTERNAL MEDICINE

## 2024-08-02 PROCEDURE — 009U3ZX DRAINAGE OF SPINAL CANAL, PERCUTANEOUS APPROACH, DIAGNOSTIC: ICD-10-PCS | Performed by: STUDENT IN AN ORGANIZED HEALTH CARE EDUCATION/TRAINING PROGRAM

## 2024-08-02 PROCEDURE — 87205 SMEAR GRAM STAIN: CPT | Performed by: STUDENT IN AN ORGANIZED HEALTH CARE EDUCATION/TRAINING PROGRAM

## 2024-08-02 PROCEDURE — 87637 SARSCOV2&INF A&B&RSV AMP PRB: CPT | Performed by: STUDENT IN AN ORGANIZED HEALTH CARE EDUCATION/TRAINING PROGRAM

## 2024-08-02 PROCEDURE — 82803 BLOOD GASES ANY COMBINATION: CPT

## 2024-08-02 PROCEDURE — 99285 EMERGENCY DEPT VISIT HI MDM: CPT | Mod: 25

## 2024-08-02 PROCEDURE — 62270 DX LMBR SPI PNXR: CPT

## 2024-08-02 PROCEDURE — 87015 SPECIMEN INFECT AGNT CONCNTJ: CPT | Performed by: STUDENT IN AN ORGANIZED HEALTH CARE EDUCATION/TRAINING PROGRAM

## 2024-08-02 PROCEDURE — 87483 CNS DNA AMP PROBE TYPE 12-25: CPT | Performed by: STUDENT IN AN ORGANIZED HEALTH CARE EDUCATION/TRAINING PROGRAM

## 2024-08-02 PROCEDURE — 82945 GLUCOSE OTHER FLUID: CPT | Performed by: STUDENT IN AN ORGANIZED HEALTH CARE EDUCATION/TRAINING PROGRAM

## 2024-08-02 PROCEDURE — 93005 ELECTROCARDIOGRAM TRACING: CPT

## 2024-08-02 PROCEDURE — 120N000001 HC R&B MED SURG/OB

## 2024-08-02 PROCEDURE — 85025 COMPLETE CBC W/AUTO DIFF WBC: CPT | Performed by: STUDENT IN AN ORGANIZED HEALTH CARE EDUCATION/TRAINING PROGRAM

## 2024-08-02 PROCEDURE — 81001 URINALYSIS AUTO W/SCOPE: CPT | Performed by: STUDENT IN AN ORGANIZED HEALTH CARE EDUCATION/TRAINING PROGRAM

## 2024-08-02 PROCEDURE — 82550 ASSAY OF CK (CPK): CPT | Performed by: STUDENT IN AN ORGANIZED HEALTH CARE EDUCATION/TRAINING PROGRAM

## 2024-08-02 PROCEDURE — 96366 THER/PROPH/DIAG IV INF ADDON: CPT

## 2024-08-02 PROCEDURE — 80053 COMPREHEN METABOLIC PANEL: CPT | Performed by: STUDENT IN AN ORGANIZED HEALTH CARE EDUCATION/TRAINING PROGRAM

## 2024-08-02 PROCEDURE — 85384 FIBRINOGEN ACTIVITY: CPT | Performed by: INTERNAL MEDICINE

## 2024-08-02 PROCEDURE — 85610 PROTHROMBIN TIME: CPT | Performed by: INTERNAL MEDICINE

## 2024-08-02 PROCEDURE — 87798 DETECT AGENT NOS DNA AMP: CPT | Performed by: INTERNAL MEDICINE

## 2024-08-02 RX ORDER — AMPICILLIN 2 G/1
2 INJECTION, POWDER, FOR SOLUTION INTRAVENOUS ONCE
Status: COMPLETED | OUTPATIENT
Start: 2024-08-02 | End: 2024-08-03

## 2024-08-02 RX ORDER — CEFTRIAXONE 2 G/1
2 INJECTION, POWDER, FOR SOLUTION INTRAMUSCULAR; INTRAVENOUS ONCE
Status: COMPLETED | OUTPATIENT
Start: 2024-08-02 | End: 2024-08-03

## 2024-08-02 RX ORDER — DEXAMETHASONE SODIUM PHOSPHATE 4 MG/ML
4 INJECTION, SOLUTION INTRA-ARTICULAR; INTRALESIONAL; INTRAMUSCULAR; INTRAVENOUS; SOFT TISSUE ONCE
Status: DISCONTINUED | OUTPATIENT
Start: 2024-08-02 | End: 2024-08-03

## 2024-08-02 RX ORDER — DROPERIDOL 2.5 MG/ML
5 INJECTION, SOLUTION INTRAMUSCULAR; INTRAVENOUS ONCE
Status: COMPLETED | OUTPATIENT
Start: 2024-08-02 | End: 2024-08-02

## 2024-08-02 RX ORDER — DEXAMETHASONE SODIUM PHOSPHATE 10 MG/ML
0.15 INJECTION, SOLUTION INTRAMUSCULAR; INTRAVENOUS ONCE
Status: COMPLETED | OUTPATIENT
Start: 2024-08-02 | End: 2024-08-02

## 2024-08-02 RX ADMIN — DEXAMETHASONE SODIUM PHOSPHATE 12 MG: 10 INJECTION INTRAMUSCULAR; INTRAVENOUS at 23:53

## 2024-08-02 RX ADMIN — SODIUM CHLORIDE 1000 ML: 9 INJECTION, SOLUTION INTRAVENOUS at 22:26

## 2024-08-02 RX ADMIN — CEFTRIAXONE SODIUM 2 G: 2 INJECTION, POWDER, FOR SOLUTION INTRAMUSCULAR; INTRAVENOUS at 22:25

## 2024-08-02 RX ADMIN — DROPERIDOL 5 MG: 2.5 INJECTION, SOLUTION INTRAMUSCULAR; INTRAVENOUS at 23:39

## 2024-08-02 ASSESSMENT — ACTIVITIES OF DAILY LIVING (ADL)
ADLS_ACUITY_SCORE: 37

## 2024-08-02 ASSESSMENT — COLUMBIA-SUICIDE SEVERITY RATING SCALE - C-SSRS
6. HAVE YOU EVER DONE ANYTHING, STARTED TO DO ANYTHING, OR PREPARED TO DO ANYTHING TO END YOUR LIFE?: NO
1. IN THE PAST MONTH, HAVE YOU WISHED YOU WERE DEAD OR WISHED YOU COULD GO TO SLEEP AND NOT WAKE UP?: NO
2. HAVE YOU ACTUALLY HAD ANY THOUGHTS OF KILLING YOURSELF IN THE PAST MONTH?: NO

## 2024-08-03 ENCOUNTER — APPOINTMENT (OUTPATIENT)
Dept: GENERAL RADIOLOGY | Facility: CLINIC | Age: 74
DRG: 871 | End: 2024-08-03
Attending: STUDENT IN AN ORGANIZED HEALTH CARE EDUCATION/TRAINING PROGRAM
Payer: MEDICARE

## 2024-08-03 ENCOUNTER — APPOINTMENT (OUTPATIENT)
Dept: OCCUPATIONAL THERAPY | Facility: CLINIC | Age: 74
DRG: 871 | End: 2024-08-03
Attending: INTERNAL MEDICINE
Payer: MEDICARE

## 2024-08-03 ENCOUNTER — RESULTS ONLY (OUTPATIENT)
Facility: CLINIC | Age: 74
End: 2024-08-03
Payer: COMMERCIAL

## 2024-08-03 LAB
ALBUMIN SERPL BCG-MCNC: 4 G/DL (ref 3.5–5.2)
ALP SERPL-CCNC: 85 U/L (ref 40–150)
ALT SERPL W P-5'-P-CCNC: 50 U/L (ref 0–70)
ANION GAP SERPL CALCULATED.3IONS-SCNC: 13 MMOL/L (ref 7–15)
APPEARANCE CSF: CLEAR
APTT PPP: 26 SECONDS (ref 22–38)
AST SERPL W P-5'-P-CCNC: 48 U/L (ref 0–45)
ATRIAL RATE - MUSE: 109 BPM
B BURGDOR IGG+IGM SER QL: 0.15
BASOPHILS # BLD AUTO: 0 10E3/UL (ref 0–0.2)
BASOPHILS NFR BLD AUTO: 0 %
BILIRUB SERPL-MCNC: 1 MG/DL
BUN SERPL-MCNC: 23.9 MG/DL (ref 8–23)
C GATTII+NEOFOR DNA CSF QL NAA+NON-PROBE: NEGATIVE
CALCIUM SERPL-MCNC: 8 MG/DL (ref 8.8–10.4)
CHLORIDE SERPL-SCNC: 97 MMOL/L (ref 98–107)
CMV DNA CSF QL NAA+NON-PROBE: NEGATIVE
COLOR CSF: COLORLESS
CREAT SERPL-MCNC: 1.29 MG/DL (ref 0.67–1.17)
D DIMER PPP FEU-MCNC: 2.26 UG/ML FEU (ref 0–0.5)
DIASTOLIC BLOOD PRESSURE - MUSE: NORMAL MMHG
E COLI K1 AG CSF QL: NEGATIVE
EGFRCR SERPLBLD CKD-EPI 2021: 59 ML/MIN/1.73M2
EOSINOPHIL # BLD AUTO: 0 10E3/UL (ref 0–0.7)
EOSINOPHIL NFR BLD AUTO: 0 %
ERYTHROCYTE [DISTWIDTH] IN BLOOD BY AUTOMATED COUNT: 13.9 % (ref 10–15)
EV RNA SPEC QL NAA+PROBE: NEGATIVE
FIBRINOGEN PPP-MCNC: 429 MG/DL (ref 170–510)
GLUCOSE BLDC GLUCOMTR-MCNC: 167 MG/DL (ref 70–99)
GLUCOSE BLDC GLUCOMTR-MCNC: 203 MG/DL (ref 70–99)
GLUCOSE BLDC GLUCOMTR-MCNC: 227 MG/DL (ref 70–99)
GLUCOSE BLDC GLUCOMTR-MCNC: 316 MG/DL (ref 70–99)
GLUCOSE BLDC GLUCOMTR-MCNC: 354 MG/DL (ref 70–99)
GLUCOSE CSF-MCNC: 79 MG/DL (ref 40–70)
GLUCOSE SERPL-MCNC: 226 MG/DL (ref 70–99)
GP B STREP DNA CSF QL NAA+NON-PROBE: NEGATIVE
HAEM INFLU DNA CSF QL NAA+NON-PROBE: NEGATIVE
HBA1C MFR BLD: 7.3 %
HCO3 SERPL-SCNC: 22 MMOL/L (ref 22–29)
HCT VFR BLD AUTO: 34.5 % (ref 40–53)
HGB BLD-MCNC: 11.5 G/DL (ref 13.3–17.7)
HHV6 DNA CSF QL NAA+NON-PROBE: NEGATIVE
HSV1 DNA CSF QL NAA+NON-PROBE: NEGATIVE
HSV2 DNA CSF QL NAA+NON-PROBE: NEGATIVE
IMM GRANULOCYTES # BLD: 0 10E3/UL
IMM GRANULOCYTES NFR BLD: 0 %
INR PPP: 1.12 (ref 0.85–1.15)
INR PPP: 1.15 (ref 0.85–1.15)
INTERPRETATION ECG - MUSE: NORMAL
L MONOCYTOG DNA CSF QL NAA+NON-PROBE: NEGATIVE
LYMPH ABN NFR CSF MANUAL: 62 %
LYMPHOCYTES # BLD AUTO: 1.1 10E3/UL (ref 0.8–5.3)
LYMPHOCYTES NFR BLD AUTO: 12 %
MAGNESIUM SERPL-MCNC: 1.4 MG/DL (ref 1.7–2.3)
MAGNESIUM SERPL-MCNC: 2.8 MG/DL (ref 1.7–2.3)
MCH RBC QN AUTO: 29.6 PG (ref 26.5–33)
MCHC RBC AUTO-ENTMCNC: 33.3 G/DL (ref 31.5–36.5)
MCV RBC AUTO: 89 FL (ref 78–100)
MONOCYTES # BLD AUTO: 0.3 10E3/UL (ref 0–1.3)
MONOCYTES NFR BLD AUTO: 3 %
MONOS+MACROS NFR CSF MANUAL: 11 %
N MEN DNA CSF QL NAA+NON-PROBE: NEGATIVE
NEUTROPHILS # BLD AUTO: 7.7 10E3/UL (ref 1.6–8.3)
NEUTROPHILS NFR BLD AUTO: 85 %
NEUTROPHILS NFR CSF MANUAL: 27 %
NRBC # BLD AUTO: 0 10E3/UL
NRBC BLD AUTO-RTO: 0 /100
OSMOLALITY SERPL: 290 MMOL/KG (ref 280–301)
OSMOLALITY UR: 370 MMOL/KG (ref 100–1200)
P AXIS - MUSE: 41 DEGREES
PARECHOVIRUS A RNA CSF QL NAA+NON-PROBE: NEGATIVE
PHOSPHATE SERPL-MCNC: 2.3 MG/DL (ref 2.5–4.5)
PHOSPHATE SERPL-MCNC: 2.8 MG/DL (ref 2.5–4.5)
PLATELET # BLD AUTO: 120 10E3/UL (ref 150–450)
POTASSIUM SERPL-SCNC: 4.1 MMOL/L (ref 3.4–5.3)
PR INTERVAL - MUSE: 114 MS
PROT CSF-MCNC: 56.3 MG/DL (ref 15–45)
PROT SERPL-MCNC: 7 G/DL (ref 6.4–8.3)
QRS DURATION - MUSE: 78 MS
QT - MUSE: 348 MS
QTC - MUSE: 468 MS
R AXIS - MUSE: -53 DEGREES
RBC # BLD AUTO: 3.89 10E6/UL (ref 4.4–5.9)
RBC # CSF MANUAL: 25 /UL (ref 0–2)
RETICS # AUTO: 0.04 10E6/UL (ref 0.03–0.1)
RETICS/RBC NFR AUTO: 1.1 % (ref 0.5–2)
S PNEUM DNA CSF QL NAA+NON-PROBE: NEGATIVE
SODIUM SERPL-SCNC: 132 MMOL/L (ref 135–145)
SODIUM UR-SCNC: 37 MMOL/L
SYSTOLIC BLOOD PRESSURE - MUSE: NORMAL MMHG
T AXIS - MUSE: 14 DEGREES
TSH SERPL DL<=0.005 MIU/L-ACNC: 0.53 UIU/ML (ref 0.3–4.2)
TUBE # CSF: 4
VENTRICULAR RATE- MUSE: 109 BPM
VZV DNA CSF QL NAA+NON-PROBE: NEGATIVE
WBC # BLD AUTO: 9.1 10E3/UL (ref 4–11)
WBC # CSF MANUAL: 195 /UL (ref 0–5)

## 2024-08-03 PROCEDURE — 83930 ASSAY OF BLOOD OSMOLALITY: CPT | Performed by: INTERNAL MEDICINE

## 2024-08-03 PROCEDURE — 83735 ASSAY OF MAGNESIUM: CPT | Performed by: INTERNAL MEDICINE

## 2024-08-03 PROCEDURE — 84300 ASSAY OF URINE SODIUM: CPT | Performed by: INTERNAL MEDICINE

## 2024-08-03 PROCEDURE — 250N000011 HC RX IP 250 OP 636: Performed by: STUDENT IN AN ORGANIZED HEALTH CARE EDUCATION/TRAINING PROGRAM

## 2024-08-03 PROCEDURE — 36415 COLL VENOUS BLD VENIPUNCTURE: CPT | Performed by: INTERNAL MEDICINE

## 2024-08-03 PROCEDURE — 250N000011 HC RX IP 250 OP 636: Performed by: INTERNAL MEDICINE

## 2024-08-03 PROCEDURE — 250N000009 HC RX 250: Performed by: INTERNAL MEDICINE

## 2024-08-03 PROCEDURE — 250N000013 HC RX MED GY IP 250 OP 250 PS 637: Performed by: INTERNAL MEDICINE

## 2024-08-03 PROCEDURE — 85025 COMPLETE CBC W/AUTO DIFF WBC: CPT | Performed by: INTERNAL MEDICINE

## 2024-08-03 PROCEDURE — 250N000013 HC RX MED GY IP 250 OP 250 PS 637: Performed by: STUDENT IN AN ORGANIZED HEALTH CARE EDUCATION/TRAINING PROGRAM

## 2024-08-03 PROCEDURE — 84100 ASSAY OF PHOSPHORUS: CPT | Performed by: INTERNAL MEDICINE

## 2024-08-03 PROCEDURE — 93010 ELECTROCARDIOGRAM REPORT: CPT | Performed by: INTERNAL MEDICINE

## 2024-08-03 PROCEDURE — 250N000011 HC RX IP 250 OP 636

## 2024-08-03 PROCEDURE — 99222 1ST HOSP IP/OBS MODERATE 55: CPT | Performed by: INTERNAL MEDICINE

## 2024-08-03 PROCEDURE — 97535 SELF CARE MNGMENT TRAINING: CPT | Mod: GO

## 2024-08-03 PROCEDURE — 85730 THROMBOPLASTIN TIME PARTIAL: CPT | Performed by: INTERNAL MEDICINE

## 2024-08-03 PROCEDURE — 258N000003 HC RX IP 258 OP 636

## 2024-08-03 PROCEDURE — 80053 COMPREHEN METABOLIC PANEL: CPT | Performed by: INTERNAL MEDICINE

## 2024-08-03 PROCEDURE — 258N000003 HC RX IP 258 OP 636: Performed by: INTERNAL MEDICINE

## 2024-08-03 PROCEDURE — 99232 SBSQ HOSP IP/OBS MODERATE 35: CPT | Performed by: INTERNAL MEDICINE

## 2024-08-03 PROCEDURE — 85045 AUTOMATED RETICULOCYTE COUNT: CPT | Performed by: INTERNAL MEDICINE

## 2024-08-03 PROCEDURE — 83935 ASSAY OF URINE OSMOLALITY: CPT | Performed by: INTERNAL MEDICINE

## 2024-08-03 PROCEDURE — 84443 ASSAY THYROID STIM HORMONE: CPT | Performed by: INTERNAL MEDICINE

## 2024-08-03 PROCEDURE — 97165 OT EVAL LOW COMPLEX 30 MIN: CPT | Mod: GO

## 2024-08-03 PROCEDURE — 87798 DETECT AGENT NOS DNA AMP: CPT | Performed by: INTERNAL MEDICINE

## 2024-08-03 PROCEDURE — 250N000012 HC RX MED GY IP 250 OP 636 PS 637: Performed by: INTERNAL MEDICINE

## 2024-08-03 PROCEDURE — 71046 X-RAY EXAM CHEST 2 VIEWS: CPT

## 2024-08-03 PROCEDURE — 83036 HEMOGLOBIN GLYCOSYLATED A1C: CPT | Performed by: INTERNAL MEDICINE

## 2024-08-03 PROCEDURE — 93005 ELECTROCARDIOGRAM TRACING: CPT

## 2024-08-03 PROCEDURE — 85610 PROTHROMBIN TIME: CPT | Performed by: INTERNAL MEDICINE

## 2024-08-03 PROCEDURE — 120N000013 HC R&B IMCU

## 2024-08-03 RX ORDER — NALOXONE HYDROCHLORIDE 0.4 MG/ML
0.4 INJECTION, SOLUTION INTRAMUSCULAR; INTRAVENOUS; SUBCUTANEOUS
Status: DISCONTINUED | OUTPATIENT
Start: 2024-08-03 | End: 2024-08-11 | Stop reason: HOSPADM

## 2024-08-03 RX ORDER — VANCOMYCIN 1.75 GRAM/500 ML IN 0.9 % SODIUM CHLORIDE INTRAVENOUS
1750 ONCE
Status: COMPLETED | OUTPATIENT
Start: 2024-08-03 | End: 2024-08-03

## 2024-08-03 RX ORDER — AMLODIPINE BESYLATE 5 MG/1
5 TABLET ORAL 2 TIMES DAILY
COMMUNITY

## 2024-08-03 RX ORDER — ONDANSETRON 4 MG/1
4 TABLET, ORALLY DISINTEGRATING ORAL EVERY 6 HOURS PRN
Status: DISCONTINUED | OUTPATIENT
Start: 2024-08-03 | End: 2024-08-11 | Stop reason: HOSPADM

## 2024-08-03 RX ORDER — POLYETHYLENE GLYCOL 3350 17 G/17G
17 POWDER, FOR SOLUTION ORAL 2 TIMES DAILY PRN
Status: DISCONTINUED | OUTPATIENT
Start: 2024-08-03 | End: 2024-08-11 | Stop reason: HOSPADM

## 2024-08-03 RX ORDER — OXYCODONE HYDROCHLORIDE 5 MG/1
5 TABLET ORAL EVERY 4 HOURS PRN
Status: DISCONTINUED | OUTPATIENT
Start: 2024-08-03 | End: 2024-08-06

## 2024-08-03 RX ORDER — PROCHLORPERAZINE 25 MG
12.5 SUPPOSITORY, RECTAL RECTAL EVERY 12 HOURS PRN
Status: DISCONTINUED | OUTPATIENT
Start: 2024-08-03 | End: 2024-08-11 | Stop reason: HOSPADM

## 2024-08-03 RX ORDER — ACETAMINOPHEN 650 MG/1
650 SUPPOSITORY RECTAL EVERY 4 HOURS PRN
Status: DISCONTINUED | OUTPATIENT
Start: 2024-08-03 | End: 2024-08-11 | Stop reason: HOSPADM

## 2024-08-03 RX ORDER — ATORVASTATIN CALCIUM 20 MG/1
20 TABLET, FILM COATED ORAL DAILY
COMMUNITY

## 2024-08-03 RX ORDER — LORAZEPAM 2 MG/ML
.5-1 INJECTION INTRAMUSCULAR EVERY 4 HOURS PRN
Status: DISCONTINUED | OUTPATIENT
Start: 2024-08-03 | End: 2024-08-06

## 2024-08-03 RX ORDER — LISINOPRIL 20 MG/1
20 TABLET ORAL DAILY
COMMUNITY

## 2024-08-03 RX ORDER — NICOTINE POLACRILEX 4 MG
15-30 LOZENGE BUCCAL
Status: DISCONTINUED | OUTPATIENT
Start: 2024-08-03 | End: 2024-08-11 | Stop reason: HOSPADM

## 2024-08-03 RX ORDER — AMPICILLIN 2 G/1
2 INJECTION, POWDER, FOR SOLUTION INTRAVENOUS EVERY 4 HOURS
Status: DISCONTINUED | OUTPATIENT
Start: 2024-08-03 | End: 2024-08-03

## 2024-08-03 RX ORDER — DEXAMETHASONE SODIUM PHOSPHATE 4 MG/ML
12 INJECTION, SOLUTION INTRA-ARTICULAR; INTRALESIONAL; INTRAMUSCULAR; INTRAVENOUS; SOFT TISSUE EVERY 6 HOURS
Status: DISCONTINUED | OUTPATIENT
Start: 2024-08-03 | End: 2024-08-04

## 2024-08-03 RX ORDER — NALOXONE HYDROCHLORIDE 0.4 MG/ML
0.2 INJECTION, SOLUTION INTRAMUSCULAR; INTRAVENOUS; SUBCUTANEOUS
Status: DISCONTINUED | OUTPATIENT
Start: 2024-08-03 | End: 2024-08-11 | Stop reason: HOSPADM

## 2024-08-03 RX ORDER — CALCIUM CARBONATE 500 MG/1
1000 TABLET, CHEWABLE ORAL 4 TIMES DAILY PRN
Status: DISCONTINUED | OUTPATIENT
Start: 2024-08-03 | End: 2024-08-11 | Stop reason: HOSPADM

## 2024-08-03 RX ORDER — CEPHALEXIN 500 MG/1
500 CAPSULE ORAL
Status: ON HOLD | COMMUNITY
End: 2024-08-10

## 2024-08-03 RX ORDER — DEXTROSE MONOHYDRATE 25 G/50ML
25-50 INJECTION, SOLUTION INTRAVENOUS
Status: DISCONTINUED | OUTPATIENT
Start: 2024-08-03 | End: 2024-08-11 | Stop reason: HOSPADM

## 2024-08-03 RX ORDER — LIDOCAINE 40 MG/G
CREAM TOPICAL
Status: DISCONTINUED | OUTPATIENT
Start: 2024-08-03 | End: 2024-08-05

## 2024-08-03 RX ORDER — LIDOCAINE 40 MG/G
CREAM TOPICAL
Status: DISCONTINUED | OUTPATIENT
Start: 2024-08-03 | End: 2024-08-03

## 2024-08-03 RX ORDER — ONDANSETRON 2 MG/ML
4 INJECTION INTRAMUSCULAR; INTRAVENOUS EVERY 6 HOURS PRN
Status: DISCONTINUED | OUTPATIENT
Start: 2024-08-03 | End: 2024-08-11 | Stop reason: HOSPADM

## 2024-08-03 RX ORDER — CEFTRIAXONE 2 G/1
2 INJECTION, POWDER, FOR SOLUTION INTRAMUSCULAR; INTRAVENOUS EVERY 12 HOURS
Status: DISCONTINUED | OUTPATIENT
Start: 2024-08-03 | End: 2024-08-05

## 2024-08-03 RX ORDER — PROCHLORPERAZINE MALEATE 5 MG
5 TABLET ORAL EVERY 6 HOURS PRN
Status: DISCONTINUED | OUTPATIENT
Start: 2024-08-03 | End: 2024-08-11 | Stop reason: HOSPADM

## 2024-08-03 RX ORDER — LIDOCAINE 40 MG/G
CREAM TOPICAL
Status: DISCONTINUED | OUTPATIENT
Start: 2024-08-03 | End: 2024-08-04

## 2024-08-03 RX ORDER — VANCOMYCIN HYDROCHLORIDE
1500 EVERY 12 HOURS
Status: DISCONTINUED | OUTPATIENT
Start: 2024-08-03 | End: 2024-08-04

## 2024-08-03 RX ORDER — ACETAMINOPHEN 650 MG/1
650 SUPPOSITORY RECTAL ONCE
Status: COMPLETED | OUTPATIENT
Start: 2024-08-03 | End: 2024-08-03

## 2024-08-03 RX ORDER — BISACODYL 10 MG
10 SUPPOSITORY, RECTAL RECTAL DAILY PRN
Status: DISCONTINUED | OUTPATIENT
Start: 2024-08-03 | End: 2024-08-11 | Stop reason: HOSPADM

## 2024-08-03 RX ORDER — HYDROMORPHONE HCL IN WATER/PF 6 MG/30 ML
0.4 PATIENT CONTROLLED ANALGESIA SYRINGE INTRAVENOUS
Status: DISCONTINUED | OUTPATIENT
Start: 2024-08-03 | End: 2024-08-06

## 2024-08-03 RX ORDER — MAGNESIUM SULFATE HEPTAHYDRATE 40 MG/ML
4 INJECTION, SOLUTION INTRAVENOUS ONCE
Status: COMPLETED | OUTPATIENT
Start: 2024-08-03 | End: 2024-08-03

## 2024-08-03 RX ORDER — HYDROMORPHONE HCL IN WATER/PF 6 MG/30 ML
0.2 PATIENT CONTROLLED ANALGESIA SYRINGE INTRAVENOUS
Status: DISCONTINUED | OUTPATIENT
Start: 2024-08-03 | End: 2024-08-06

## 2024-08-03 RX ORDER — AMOXICILLIN 250 MG
2 CAPSULE ORAL 2 TIMES DAILY PRN
Status: DISCONTINUED | OUTPATIENT
Start: 2024-08-03 | End: 2024-08-11 | Stop reason: HOSPADM

## 2024-08-03 RX ORDER — ACETAMINOPHEN 325 MG/1
650 TABLET ORAL EVERY 4 HOURS PRN
Status: DISCONTINUED | OUTPATIENT
Start: 2024-08-03 | End: 2024-08-11 | Stop reason: HOSPADM

## 2024-08-03 RX ORDER — AMOXICILLIN 250 MG
1 CAPSULE ORAL 2 TIMES DAILY PRN
Status: DISCONTINUED | OUTPATIENT
Start: 2024-08-03 | End: 2024-08-11 | Stop reason: HOSPADM

## 2024-08-03 RX ORDER — ACETAMINOPHEN 500 MG
1000 TABLET ORAL ONCE
Status: DISCONTINUED | OUTPATIENT
Start: 2024-08-03 | End: 2024-08-03

## 2024-08-03 RX ADMIN — PANTOPRAZOLE SODIUM 40 MG: 40 INJECTION, POWDER, FOR SOLUTION INTRAVENOUS at 06:59

## 2024-08-03 RX ADMIN — ACETAMINOPHEN 650 MG: 325 TABLET, FILM COATED ORAL at 14:09

## 2024-08-03 RX ADMIN — AMPICILLIN SODIUM 2 G: 2 INJECTION, POWDER, FOR SOLUTION INTRAMUSCULAR; INTRAVENOUS at 00:22

## 2024-08-03 RX ADMIN — INSULIN ASPART 2 UNITS: 100 INJECTION, SOLUTION INTRAVENOUS; SUBCUTANEOUS at 10:56

## 2024-08-03 RX ADMIN — ACETAMINOPHEN 650 MG: 650 SUPPOSITORY RECTAL at 00:21

## 2024-08-03 RX ADMIN — VANCOMYCIN HYDROCHLORIDE 1750 MG: 10 INJECTION, POWDER, LYOPHILIZED, FOR SOLUTION INTRAVENOUS at 04:25

## 2024-08-03 RX ADMIN — MAGNESIUM SULFATE HEPTAHYDRATE 4 G: 40 INJECTION, SOLUTION INTRAVENOUS at 11:24

## 2024-08-03 RX ADMIN — LORAZEPAM 0.5 MG: 2 INJECTION INTRAMUSCULAR; INTRAVENOUS at 14:07

## 2024-08-03 RX ADMIN — DEXAMETHASONE SODIUM PHOSPHATE 12 MG: 4 INJECTION, SOLUTION INTRAMUSCULAR; INTRAVENOUS at 12:03

## 2024-08-03 RX ADMIN — ACYCLOVIR SODIUM 810 MG: 50 INJECTION, SOLUTION INTRAVENOUS at 03:20

## 2024-08-03 RX ADMIN — CEFTRIAXONE SODIUM 2 G: 2 INJECTION, POWDER, FOR SOLUTION INTRAMUSCULAR; INTRAVENOUS at 09:24

## 2024-08-03 RX ADMIN — DEXAMETHASONE SODIUM PHOSPHATE 12 MG: 4 INJECTION, SOLUTION INTRAMUSCULAR; INTRAVENOUS at 06:59

## 2024-08-03 RX ADMIN — Medication 1 MG: at 01:55

## 2024-08-03 RX ADMIN — INSULIN ASPART 5 UNITS: 100 INJECTION, SOLUTION INTRAVENOUS; SUBCUTANEOUS at 12:06

## 2024-08-03 RX ADMIN — INSULIN ASPART 4 UNITS: 100 INJECTION, SOLUTION INTRAVENOUS; SUBCUTANEOUS at 17:21

## 2024-08-03 RX ADMIN — CEFTRIAXONE SODIUM 2 G: 2 INJECTION, POWDER, FOR SOLUTION INTRAMUSCULAR; INTRAVENOUS at 22:07

## 2024-08-03 RX ADMIN — AMPICILLIN SODIUM 2 G: 2 INJECTION, POWDER, FOR SOLUTION INTRAMUSCULAR; INTRAVENOUS at 06:58

## 2024-08-03 RX ADMIN — DEXAMETHASONE SODIUM PHOSPHATE 12 MG: 4 INJECTION, SOLUTION INTRAMUSCULAR; INTRAVENOUS at 17:15

## 2024-08-03 RX ADMIN — SODIUM CHLORIDE 1000 ML: 9 INJECTION, SOLUTION INTRAVENOUS at 01:57

## 2024-08-03 RX ADMIN — VANCOMYCIN HYDROCHLORIDE 1500 MG: 10 INJECTION, POWDER, LYOPHILIZED, FOR SOLUTION INTRAVENOUS at 16:05

## 2024-08-03 ASSESSMENT — ACTIVITIES OF DAILY LIVING (ADL)
ADLS_ACUITY_SCORE: 44
ADLS_ACUITY_SCORE: 44
ADLS_ACUITY_SCORE: 37
ADLS_ACUITY_SCORE: 44
ADLS_ACUITY_SCORE: 44
ADLS_ACUITY_SCORE: 38
ADLS_ACUITY_SCORE: 49
ADLS_ACUITY_SCORE: 49
ADLS_ACUITY_SCORE: 41
ADLS_ACUITY_SCORE: 38
ADLS_ACUITY_SCORE: 44
ADLS_ACUITY_SCORE: 40
ADLS_ACUITY_SCORE: 38
ADLS_ACUITY_SCORE: 42
ADLS_ACUITY_SCORE: 37
ADLS_ACUITY_SCORE: 38
ADLS_ACUITY_SCORE: 44
ADLS_ACUITY_SCORE: 38
ADLS_ACUITY_SCORE: 38
ADLS_ACUITY_SCORE: 49
ADLS_ACUITY_SCORE: 38
ADLS_ACUITY_SCORE: 44
ADLS_ACUITY_SCORE: 44

## 2024-08-03 NOTE — ED NOTES
"[facility]  ED Nurse Handoff Report    ED Chief complaint: Psychiatric Evaluation      ED Diagnosis:   Final diagnoses:   JUSTINA (acute kidney injury) (H24)   Delirium       Code Status: Full Code    Allergies:   Allergies   Allergen Reactions    Seasonal Allergies        Patient Story:        Pt brought in by daughter for irritability, generalized weakness, feeling \"groggy\". Daughter reports hx of ocd and that he was normal last night at 2130, but has not seen him all day today and found pt laying on the couch. Daughter believes this is due to his mental health and has seen him like this before. Pt oriented x3, following commands, but speaking in circles. Pt states \"I'm feeling overwhelmed by my thoughts\" but will not say what these thoughts are - denies si/hi.        Focused Assessment:    Labs Ordered and Resulted from Time of ED Arrival to Time of ED Departure   COMPREHENSIVE METABOLIC PANEL - Abnormal       Result Value    Sodium 129 (*)     Potassium 3.9      Carbon Dioxide (CO2) 25      Anion Gap 13      Urea Nitrogen 22.6      Creatinine 1.45 (*)     GFR Estimate 51 (*)     Calcium 8.5 (*)     Chloride 91 (*)     Glucose 163 (*)     Alkaline Phosphatase 91      AST 45      ALT 57      Protein Total 7.4      Albumin 4.4      Bilirubin Total 1.3 (*)    CBC WITH PLATELETS AND DIFFERENTIAL - Abnormal    WBC Count 9.4      RBC Count 3.96 (*)     Hemoglobin 11.8 (*)     Hematocrit 35.8 (*)     MCV 90      MCH 29.8      MCHC 33.0      RDW 13.9      Platelet Count 138 (*)     % Neutrophils 78      % Lymphocytes 15      % Monocytes 7      % Eosinophils 0      % Basophils 0      % Immature Granulocytes 0      NRBCs per 100 WBC 0      Absolute Neutrophils 7.3      Absolute Lymphocytes 1.4      Absolute Monocytes 0.6      Absolute Eosinophils 0.0      Absolute Basophils 0.0      Absolute Immature Granulocytes 0.0      Absolute NRBCs 0.0     ROUTINE UA WITH MICROSCOPIC REFLEX TO CULTURE - Abnormal    Color " Urine Light Yellow      Appearance Urine Clear      Glucose Urine 50 (*)     Bilirubin Urine Negative      Ketones Urine Negative      Specific Gravity Urine 1.018      Blood Urine Trace (*)     pH Urine 6.0      Protein Albumin Urine 30 (*)     Urobilinogen Urine Normal      Nitrite Urine Negative      Leukocyte Esterase Urine Negative      Mucus Urine Present (*)     RBC Urine 1      WBC Urine <1     CK TOTAL - Abnormal     (*)    ISTAT GASES LACTATE VENOUS POCT - Abnormal    Lactic Acid POCT 1.4      Bicarbonate Venous POCT 25      O2 Sat, Venous POCT 32 (*)     pCO2 Venous POCT 38 (*)     pH Venous POCT 7.43      pO2 Venous POCT 19 (*)     Base Excess/Deficit (+/-) POCT 0.0     GLUCOSE CSF - Abnormal    Glucose CSF 79 (*)    PROTEIN TOTAL CSF - Abnormal    Protein total CSF 56.3 (*)    PHOSPHORUS - Abnormal    Phosphorus 2.3 (*)    ETHYL ALCOHOL LEVEL - Normal    Alcohol ethyl <0.01     INFLUENZA A/B, RSV, & SARS-COV2 PCR - Normal    Influenza A PCR Negative      Influenza B PCR Negative      RSV PCR Negative      SARS CoV2 PCR Negative     RBC AND PLATELET MORPHOLOGY    RBC Morphology Confirmed RBC Indices      Platelet Assessment        Value: Automated Count Confirmed. Platelet morphology is normal.   CELL COUNT CSF   TICK-BORNE DISEASE PANEL BY PCR   LYME DISEASE TOTAL ANTIBODIES WITH REFLEX TO CONFIRMATION   DIFFERENTIAL CSF   AEROBIC BACTERIAL CULTURE ROUTINE    Gram Stain Result No organisms seen     BLOOD CULTURE   MENINGITIS/ENCEPHALITIS PANEL QUAL PCR CSF   CELL COUNT WITH DIFFERENTIAL CSF     Medications   sodium chloride (PF) 0.9% PF flush 3 mL (has no administration in time range)   sodium chloride (PF) 0.9% PF flush 3 mL (has no administration in time range)   dexAMETHasone (DECADRON) injection 4 mg ( Intravenous Canceled Entry 8/3/24 0003)   sodium chloride 0.9% BOLUS 1,000 mL (0 mLs Intravenous Stopped 8/3/24 0026)   cefTRIAXone (ROCEPHIN) 2 g vial to attach to  ml bag for ADULTS or  NS 50 ml bag for PEDS (0 g Intravenous Stopped 8/3/24 0002)   ampicillin (OMNIPEN) 2 g vial to attach to  mL bag (0 g Intravenous Stopped 8/3/24 0046)   dexAMETHasone PF (DECADRON) injection 12 mg (12 mg Intravenous $Given 8/2/24 2353)   droPERidol (INAPSINE) injection 5 mg (5 mg Intravenous $Given 8/2/24 2339)   acetaminophen (TYLENOL) Suppository 650 mg (650 mg Rectal $Given 8/3/24 0021)     CT Head w/o Contrast   Final Result   IMPRESSION:   1.  No acute intracranial process.      XR Chest 2 Views    (Results Pending)         Treatments and/or interventions provided: abs and repeat labs        Does this patient have any cognitive concerns? Accute onset of confusion    Activity level - Baseline/Home:  Independent with one assist  Activity Level - Current:   Stand with assist x2    Patient's Preferred language: English   Needed?: No    Isolation: None  Infection: Not Applicable  Patient tested for COVID 19 prior to admission: NO  Bariatric?: No    Vital Signs:   Vitals:    08/02/24 2350 08/03/24 0000 08/03/24 0027 08/03/24 0030   BP:  (!) 149/105 (!) 134/90 (!) 134/90   Pulse:  (!) 145  112   Resp:    21   Temp:   (!) 103.5  F (39.7  C)    TempSrc:   Rectal    SpO2: 93%  94% 94%       Cardiac Rhythm:     Was the PSS-3 completed:   Yes  What interventions are required if any?               Family Comments: daughter is here at the bedside      For the majority of the shift this patient's behavior was YELLOW.   Behavioral interventions performed were soft restraints and 1:1 for patient safety.    ED NURSE PHONE NUMBER: [id number]

## 2024-08-03 NOTE — ED TRIAGE NOTES
"Pt brought in by daughter for irritability, generalized weakness, feeling \"groggy\". Daughter reports hx of ocd and that he was normal last night at 2130, but has not seen him all day today and found pt laying on the couch. Daughter believes this is due to his mental health and has seen him like this before. Pt oriented x3, following commands, but speaking in circles. Pt states \"I'm feeling overwhelmed by my thoughts\" but will not say what these thoughts are - denies si/hi.         "

## 2024-08-03 NOTE — CONSULTS
[facility]    Infectious Disease Consultation     Date of Admission:  8/2/2024  Date of Consult (When I saw the patient): 08/03/24    Assessment & Plan   Negro Aj is a 74 year old male who was admitted on 8/2/2024.     Impression: 1 74-year-old male with acute fever, confusion unclear etiology but lumbar puncture abnormal consistent with meningitis, mostly lymphocytes likely aseptic meningitis, meningitis profile negative, large differential diagnosis of infectious and noninfectious causes  2 major fever, septic like condition but procalcitonin low, blood cultures negative no obvious focal infection except the CNS process  3 diabetes mellitus  4 ATN  5 major depression and psychiatric disease    REC 1 very unlikely bacterial based on the studies done but await cultures, antibiotics for now discontinue ampicillin  2 large differential diagnosis although statistically most likely viral, await further information and follow clinically, further workup from a neurostandpoint if not improving including MRI scan etc.          Jose Hoff MD    Reason for Consult   Reason for consult: I was asked to evaluate this patient for meningitis.    Primary Care Physician   Florentin Colby    Chief Complaint   Confusion    History is obtained from the patient and medical records    History of Present Illness   Negro Aj is a 74 year old male who presents with acute confusion and fever, found to have grossly abnormal lumbar puncture with lymphocytic pleocytosis.  Meningitis profile negative as are cultures.  No recent travel or exposures known allergies been around's been ill.  No prior history of anything similar.    Past Medical History   I have reviewed this patient's medical history and updated it with pertinent information if needed.   Past Medical History:   Diagnosis Date    Anxiety     Depression with anxiety     High cholesterol     Hypertension     newly treating  in 2016    Knee pain, right     OCD (obsessive compulsive disorder)        Past Surgical History   I have reviewed this patient's surgical history and updated it with pertinent information if needed.  Past Surgical History:   Procedure Laterality Date    ARTHROPLASTY KNEE Right 2/9/2016    Procedure: ARTHROPLASTY KNEE;  Surgeon: Tiera Conteh MD;  Location: [facility] OR    ARTHROSCOPY KNEE Right        Prior to Admission Medications   Prior to Admission Medications   Prescriptions Last Dose Informant Patient Reported? Taking?   LORazepam (ATIVAN) 0.5 MG tablet Unknown  No Yes   Sig: Take 1 tablet (0.5 mg) by mouth 3 times daily as needed for anxiety   Omeprazole (PRILOSEC PO) Unknown Pharmacy Yes Yes   Sig: Take 20 mg by mouth every morning    QUEtiapine (SEROQUEL) 50 MG tablet Unknown  No Yes   Sig: Take 1 tablet (50 mg) by mouth 2 times daily   amLODIPine (NORVASC) 5 MG tablet Unknown  Yes Yes   Sig: Take 5 mg by mouth 2 times daily   aspirin EC 81 MG EC tablet Unknown  No Yes   Sig: Take 1 tablet (81 mg) by mouth daily   atorvastatin (LIPITOR) 20 MG tablet Unknown  Yes Yes   Sig: Take 20 mg by mouth daily   cephALEXin (KEFLEX) 500 MG capsule Unknown  Yes Yes   Sig: Take 500 mg by mouth Take 2 capsules by mouth 1 hour before dental appointment and 2 after, total daily dose of 4 capsules.   lisinopril (ZESTRIL) 20 MG tablet Unknown  Yes Yes   Sig: Take 20 mg by mouth daily   metFORMIN (GLUCOPHAGE) 500 MG tablet Unknown  Yes Yes   Sig: Take 500 mg by mouth 2 times daily (with meals)   metoprolol (LOPRESSOR) 25 MG tablet Unknown  No Yes   Sig: Take 1 tablet (25 mg) by mouth 2 times daily   order for DME   No No   Sig: Equipment being ordered: Walker Wheels () and Walker ()  Treatment Diagnosis: Impaired gait      Facility-Administered Medications: None     Allergies   Allergies   Allergen Reactions    Seasonal Allergies        Immunization History   Immunization History   Administered Date(s) Administered     "COVID-19 Monovalent 18+ (Moderna) 03/15/2021, 04/12/2021, 12/06/2021       Social History   I have reviewed this patient's social history and updated it with pertinent information if needed. Negro Aj  reports that he has never smoked. He does not have any smokeless tobacco history on file. He reports current alcohol use. He reports that he does not use drugs.    Family History   I have reviewed this patient's family history and updated it with pertinent information if needed.   Family History   Problem Relation Age of Onset    Depression Mother     Anxiety Disorder Mother     Bipolar Disorder Mother     Bipolar Disorder Sister     Depression Sister     Depression Sister        Review of Systems   The 10 point Review of Systems is negative    Physical Exam   Temp: 100.4  F (38  C) Temp src: Axillary BP: (!) 150/98 Pulse: 110   Resp: (!) 34 SpO2: 97 % O2 Device: None (Room air)    Vital Signs with Ranges  Temp:  [97.8  F (36.6  C)-103.5  F (39.7  C)] 100.4  F (38  C)  Pulse:  [] 110  Resp:  [16-38] 34  BP: (118-150)/() 150/98  SpO2:  [90 %-98 %] 97 %  171 lbs 11.81 oz  Body mass index is 25.73 kg/m .    GENERAL APPEARANCE:  awake fully communicative but confused  EYES: Eyes grossly normal to inspection  NECK: no adenopathy supple  RESP: lungs clear   CV: regular rates and rhythm  LYMPHATICS: normal ant/post cervical and supraclavicular nodes  ABDOMEN: soft, nontender  MS: extremities normal  SKIN: no suspicious lesions or rashes        Data   All laboratory and imaging data in the past 24 hours reviewed  No results for input(s): \"CULT\" in the last 168 hours.  No lab results found.    Invalid input(s): \"UC\"       All cultures:  Recent Labs   Lab 08/02/24 2312 08/02/24 2148   CULTURE No growth, less than 1 day No growth after 12 hours      Blood culture:  Results for orders placed or performed during the hospital encounter of 08/02/24   Blood Culture Peripheral Blood    Specimen: Peripheral " Blood   Result Value Ref Range    Culture No growth after 12 hours       Urine culture:  No results found for this or any previous visit.
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
## 2024-08-03 NOTE — PHARMACY-VANCOMYCIN DOSING SERVICE
Pharmacy Vancomycin Initial Note  Date of Service August 3, 2024  Patient's  1950  74 year old, male    Indication: Meningitis    Current estimated CrCl = Estimated Creatinine Clearance: 57 mL/min (A) (based on SCr of 1.29 mg/dL (H)).    Creatinine for last 3 days  2024:  8:19 PM Creatinine 1.45 mg/dL  8/3/2024:  6:03 AM Creatinine 1.29 mg/dL    Recent Vancomycin Level(s) for last 3 days  No results found for requested labs within last 3 days.      Vancomycin IV Administrations (past 72 hours)                     vancomycin (VANCOCIN) 1,750 mg in 0.9% NaCl 500 mL intermittent infusion (mg) 1,750 mg New Bag 24 0425                    Nephrotoxins and other renal medications (From now, onward)      Start     Dose/Rate Route Frequency Ordered Stop    24 1600  vancomycin (VANCOCIN) 1,500 mg in 0.9% NaCl 250 mL intermittent infusion         1,500 mg  166.7 mL/hr over 90 Minutes Intravenous EVERY 12 HOURS 24 1326              Contrast Orders - past 72 hours (72h ago, onward)      None                Plan:  Start vancomycin  1750 mg load then 1500 mg IV q12h.   Vancomycin monitoring method: Trough (Method 2 = manual dose calculation)  Vancomycin therapeutic monitoring goal: 15-20 mg/L  Pharmacy will check vancomycin levels as appropriate in 1-3 Days.    Serum creatinine levels will be ordered daily for the first week of therapy and at least twice weekly for subsequent weeks.      Esthela Meng, CASE, PharmD, BCPS

## 2024-08-03 NOTE — PROGRESS NOTES
RECEIVING UNIT ED HANDOFF REVIEW    ED Nurse Handoff Report was reviewed by: Harjinder Pina RN on August 3, 2024 at 1:36 AM
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
## 2024-08-03 NOTE — PROGRESS NOTES
Patient seen, dictation to follow, presumed aseptic meningitis negative meningitis profile, antibiotics 1 more day but unlikely bacterial, no obvious travel or other exposures.

## 2024-08-03 NOTE — PROGRESS NOTES
Orientation: AOx2-3 confused to situation  Pain management: PRN tylenol for fever  Vitals/Tele: ST with PAC and PVC  IV Access/drains: PIV L  Diet: Regular  Mobility: SBA with gait belt  GI/: Cont. B&B  Wound/Skin: BLE/U rushes, scattered bruises and scabs.  Consults: Infectious disease following   Discharge Plan: Pending   Other info: Sitter at bedside    See Flow sheets for assessment

## 2024-08-03 NOTE — PROGRESS NOTES
08/03/24 1000   Appointment Info   Signing Clinician's Name / Credentials (OT) Mindy Mejia, OTR/L   Rehab Comments (OT) consulted for cog eval       Present no   Living Environment   People in Home child(praveen), adult   Current Living Arrangements house   Home Accessibility stairs within home   Number of Stairs, Within Home, Primary greater than 10 stairs   Stair Railings, Within Home, Primary railings safe and in good condition   Transportation Anticipated family or friend will provide   Living Environment Comments Pt lives in a house with daughter who works outside of the home. Granddaughter recently graduated college and lives in basement. Pt can meet most needs on main floor, per understanding. Has tub/shower. Hoping to renovate and then would have a walk in shower. Owns a rental property, tangential and difficult to understand.   Self-Care   Usual Activity Tolerance good   Current Activity Tolerance moderate   Regular Exercise Yes   Activity/Exercise Type   (house projects)   Equipment Currently Used at Home none   Fall history within last six months no   Activity/Exercise/Self-Care Comment ind with ADLs   Instrumental Activities of Daily Living (IADL)   IADL Comments Pt drives and manages own meds. Per understanding, pt is ind with IADLs. Stated daughter mows lawn.   General Information   Onset of Illness/Injury or Date of Surgery 08/02/24   Referring Physician Oliveira, Lucio Gonzalez MD   Patient/Family Therapy Goal Statement (OT) not stated   Additional Occupational Profile Info/Pertinent History of Current Problem Negro Aj is a 74 year old male admitted on 8/2/2024. He was brought to the emergency department by his daughter after she noted him to be confused and fatigued on the couch.  Upon arrival with significant rigors, fever to 103.5, and encephalopathy.   Existing Precautions/Restrictions fall   Limitations/Impairments safety/cognitive   Left Upper Extremity  (Weight-bearing Status) full weight-bearing (FWB)   Right Upper Extremity (Weight-bearing Status) full weight-bearing (FWB)   Left Lower Extremity (Weight-bearing Status) full weight-bearing (FWB)   Right Lower Extremity (Weight-bearing Status) full weight-bearing (FWB)   General Observations and Info shakiness   Cognitive Status Examination   Orientation Status orientation to person, place and time  (needed time to process)   Cognitive Screens/Assessments   Cognitive Assessments Completed [facility] Mental Status Exam ([facility]):  Total Score out of /30 11/30   [facility] Norms 1-20 equals dementia   [facility] Domains assessed: orientation, memory, attention, executive functions   [facility] Interpretation Pt scored 11/30 on [facility] cog screen. Score indicates probable cognition relative to dementia level. 3/3 orientation, 0/3 money mgmt math, 1/3 animal recall pts received, 3/5 delayed object recall, 0/2 number reversals, 0/4 clock drawing, 2/2 shape rec, 2/8 story recall. Of note, pt had just had [employer] gymnastics on TV in room (turned TV off to limit distractions). When asked about name, job, etc from [facility] story, pt related all information to [employer] and gymnastics.   Visual Perception   Impact of Vision Impairment on Function (Vision) Pt typically wears glasses, not with him. Was able to see functionally for [facility] assessment without glasses   Posture   Posture forward head position   Strength Comprehensive (MMT)   Comment, General Manual Muscle Testing (MMT) Assessment WFL   Coordination   Coordination Comments shakiness noted   Bed Mobility   Comment (Bed Mobility) not assessed   Transfers   Transfer Comments STS CGA/gait belt and IV pole, unsteadiness noted   Balance   Balance Comments balance deficits present.   Activities of Daily Living   BADL Assessment/Intervention bathing;lower body dressing;upper body dressing;grooming;toileting   Additional Documentation Comment, IADL Assessment/Training  (Row)  (higher level IADLs - driving, cooking, meds, finances)   Bathing Assessment/Intervention   [place] Level (Bathing) modified independence;contact guard assist;minimum assist (75% patient effort);verbal cues   Comment, (Bathing) Per clinical judgment   Upper Body Dressing Assessment/Training   Position (Upper Body Dressing) supported sitting   Comment, (Upper Body Dressing) Per clinical judgment   [place] Level (Upper Body Dressing) set up;supervision;verbal cues   Lower Body Dressing Assessment/Training   Position (Lower Body Dressing) supported sitting   Comment, (Lower Body Dressing) Per clinical judgment   [place] Level (Lower Body Dressing) supervision;set up;verbal cues   Grooming Assessment/Training   [place] Level (Grooming) contact guard assist;modified independence;verbal cues   Comment, (Grooming) Per clinical judgment   Toileting   Comment, (Toileting) Per clinical judgment   [place] Level (Toileting) modified independence;contact guard assist   Clinical Impression   Criteria for Skilled Therapeutic Interventions Met (OT) Yes, treatment indicated   OT Diagnosis Pt presents below baseline with cognitive deficits present.   OT Problem List-Impairments impacting ADL problems related to;balance;cognition;mobility   Assessment of Occupational Performance 5 or more Performance Deficits   Identified Performance Deficits all ADLs due to balance/cog, driving, med & financial mgmt, cooking   Planned Therapy Interventions (OT) ADL retraining;IADL retraining;balance training;cognition;transfer training;risk factor education;progressive activity/exercise   Clinical Decision Making Complexity (OT) problem focused assessment/low complexity   Risk & Benefits of therapy have been explained evaluation/treatment results reviewed;care plan/treatment goals reviewed;risks/benefits reviewed;current/potential barriers reviewed;participants voiced agreement with care plan;participants  included;patient   Clinical Impression Comments Pt presents below baseline with cognitive deficits present. Would benefit from skilled IP OT to progress safety and ind with I/ADLs and to rec safe discharge.   OT Total Evaluation Time   OT Eval, Low Complexity Minutes (51985) 25   OT Goals   Therapy Frequency (OT) 6 times/week   OT Predicted Duration/Target Date for Goal Attainment 08/17/24   OT Goals Hygiene/Grooming;Upper Body Dressing;Lower Body Dressing;Transfers;Toilet Transfer/Toileting;Cognition;OT Goal 1;OT Goal 2   OT: Hygiene/Grooming independent   OT: Upper Body Dressing Independent;including set-up/clothing retrieval   OT: Lower Body Dressing Independent;including set-up/clothing retrieval   OT: Transfer Independent  (tub/shower tx)   OT: Toilet Transfer/Toileting Independent;cleaning and garment management   OT: Cognitive Patient/caregiver will verbalize understanding of cognitive assessment results/recommendations as needed for safe discharge planning   OT: Goal 1 Pt will score 100% on home safety questions   OT: Goal 2 Pt will complete med mgmt task with 100% score   Interventions   Interventions Quick Adds Self-Care/Home Management   Self-Care/Home Management   Self-Care/Home Mgmt/ADL, Compensatory, Meal Prep Minutes (83296) 10   Symptoms Noted During/After Treatment (Meal Preparation/Planning Training) significant change in vital signs   Treatment Detail/Skilled Intervention Pt greeted for OT eval/treat. 1:1 present before and after session. Pt on phone and not acknowledging therapist. Some non-sensical speaking notable, but some language appropriate. Therapist needing to ask for pt to respectfully hang up on 2x occasions. Pt grasping onto phone tightly the entire session and needing therapist to assist moving phone to the side to participate. Closing eyes at times, but fully alert. Required re-direction, providing round-about responses to questions. OT administered SLUMs per OT order. Pt scored  within dementia range (see results above). Pt educated on score and POC/retesting via other methods to ensure safe discharge plan is in place. OT inquired if pt could have help for driving/meds and pt stated daughter works. Appears pt has limit insight to deficits. STS completed from chair with CGA/gait belt, pt holding onto IV pole. Unsteadiness noted. Edu on rec to use walker while in hospital and pt denied, stated it makes walking worse. During standing to assess standing tolerance for ADLs, OT administered home safety questions. Pt not sure what to do if he had a fall and didn't have phone on him. Was aware of what to do if gas/fire in home. HR increasing from 110 - 140 BPM. No symptoms reported, shakiness observed, however. Pt returned to chair. All needs met and 1:1 present.   OT Discharge Planning   OT Plan Additional cog screen - review recs for IADL assistance pending score (especially if daughter present), med mgmt task, extensive home safety quesions. Progress ADL endurance.   OT Discharge Recommendation (DC Rec) Transitional Care Facility;home with assist;home with home care occupational therapy   OT Rationale for DC Rec Pt below baseline with severe cog deficits and balance deficits at this time. CGA x 1 for standing/marching, unsteadiness. Fall risk. SLUMs 11/30. Lives with daughter (and granddaughter in basement), but both work and appear to not be available 24/7. Rec 24/7 assist and assist for driving, meds, finances, cooking and HH OT for cog. If level of assist not available, rec TCU. Will update pending progress.   OT Brief overview of current status CGA x 1. SLUMS 11/30.   OT Equipment Needed at Discharge   (pending balance changes, may benefit from shower chair and walker)   Total Session Time   Timed Code Treatment Minutes 10   Total Session Time (sum of timed and untimed services) 35

## 2024-08-03 NOTE — ED PROVIDER NOTES
"  Emergency Department Note      History of Present Illness     Chief Complaint   Psychiatric Evaluation    HPI   Negro Aj is a pleasant 74 year old male with history of OCD, major depressive disorder, type 2 diabetes, among others presents for a psychiatric evaluation.  Per his daughter he was normal at 2100 last night. He is not making sense and keeps talking is circles per his daughter. His daughter adds today he was laying on the couch all day today. He has been eating at home. He states he has been overwhelmed with thoughts. No SI. He has been working a lot around the house over the last few days. He has a rash on his legs which he thinks is from working on his evergreen tree. In the ED, he adds he feels a little bit \"foggy\" and \"out of it.\" He denies pain. When he stands up he feels unsteady. His daughter notes he drinks beer but not regularly. No drug use. He adds he feels a little bit anxious and groggy. He adds he feels generalized weakness. No shortness of breath.     Independent Historian   Daughter as detailed above.    Review of External Notes   I personally reviewed notes from the patient's clinic visit dated  5/1/2024 . This provided me with information regarding patient's baseline medical problems.     I personally reviewed the patient's chart, including available medication list and available past medical history, past surgical history, family history, and social history.    Physical Exam     Patient Vitals for the past 24 hrs:   BP Temp Temp src Pulse Resp SpO2   08/03/24 0200 -- 99.2  F (37.3  C) Temporal -- -- --   08/03/24 0100 118/74 -- -- 104 29 90 %   08/03/24 0030 (!) 134/90 -- -- 112 21 94 %   08/03/24 0027 (!) 134/90 (!) 103.5  F (39.7  C) Rectal -- -- 94 %   08/03/24 0000 (!) 149/105 -- -- (!) 145 -- --   08/02/24 2350 -- -- -- -- -- 93 %   08/02/24 2200 (!) 146/81 -- -- -- -- --   08/02/24 2130 -- 100.4  F (38  C) Temporal -- -- 93 %   08/02/24 1959 (!) 149/71 98.1  F " (36.7  C) -- 88 16 96 %      Physical Exam  Vitals and nursing note reviewed.   Constitutional:       General: He is not in acute distress.     Appearance: He is ill-appearing.   HENT:      Head: Atraumatic.   Eyes:      General: No scleral icterus.     Conjunctiva/sclera: Conjunctivae normal.   Neck:      Meningeal: Brudzinski's sign and Kernig's sign absent.   Cardiovascular:      Rate and Rhythm: Normal rate and regular rhythm.   Pulmonary:      Effort: Pulmonary effort is normal.      Breath sounds: Normal breath sounds.   Abdominal:      Palpations: Abdomen is soft.      Tenderness: There is no abdominal tenderness. There is no guarding or rebound.   Musculoskeletal:      Cervical back: Neck supple. No rigidity.   Skin:     General: Skin is warm.      Findings: Rash (Petechial rash bilateral lower extremities) present.      Comments: Rash pictured below   Neurological:      Mental Status: He is alert.      Sensory: No sensory deficit.      Motor: No weakness.      Gait: Gait abnormal (Slightly wide-based gait, slightly unsteady).      Comments: Patient is oriented to person, place, situation but not to time.  Patient has difficulty following commands                      Diagnostics     Lab Results   Labs Ordered and Resulted from Time of ED Arrival to Time of ED Departure   COMPREHENSIVE METABOLIC PANEL - Abnormal       Result Value    Sodium 129 (*)     Potassium 3.9      Carbon Dioxide (CO2) 25      Anion Gap 13      Urea Nitrogen 22.6      Creatinine 1.45 (*)     GFR Estimate 51 (*)     Calcium 8.5 (*)     Chloride 91 (*)     Glucose 163 (*)     Alkaline Phosphatase 91      AST 45      ALT 57      Protein Total 7.4      Albumin 4.4      Bilirubin Total 1.3 (*)    CBC WITH PLATELETS AND DIFFERENTIAL - Abnormal    WBC Count 9.4      RBC Count 3.96 (*)     Hemoglobin 11.8 (*)     Hematocrit 35.8 (*)     MCV 90      MCH 29.8      MCHC 33.0      RDW 13.9      Platelet Count 138 (*)     % Neutrophils 78      %  Lymphocytes 15      % Monocytes 7      % Eosinophils 0      % Basophils 0      % Immature Granulocytes 0      NRBCs per 100 WBC 0      Absolute Neutrophils 7.3      Absolute Lymphocytes 1.4      Absolute Monocytes 0.6      Absolute Eosinophils 0.0      Absolute Basophils 0.0      Absolute Immature Granulocytes 0.0      Absolute NRBCs 0.0     ROUTINE UA WITH MICROSCOPIC REFLEX TO CULTURE - Abnormal    Color Urine Light Yellow      Appearance Urine Clear      Glucose Urine 50 (*)     Bilirubin Urine Negative      Ketones Urine Negative      Specific Gravity Urine 1.018      Blood Urine Trace (*)     pH Urine 6.0      Protein Albumin Urine 30 (*)     Urobilinogen Urine Normal      Nitrite Urine Negative      Leukocyte Esterase Urine Negative      Mucus Urine Present (*)     RBC Urine 1      WBC Urine <1     CK TOTAL - Abnormal     (*)    ISTAT GASES LACTATE VENOUS POCT - Abnormal    Lactic Acid POCT 1.4      Bicarbonate Venous POCT 25      O2 Sat, Venous POCT 32 (*)     pCO2 Venous POCT 38 (*)     pH Venous POCT 7.43      pO2 Venous POCT 19 (*)     Base Excess/Deficit (+/-) POCT 0.0     GLUCOSE CSF - Abnormal    Glucose CSF 79 (*)    PROTEIN TOTAL CSF - Abnormal    Protein total CSF 56.3 (*)    CELL COUNT CSF - Abnormal    Tube Number 4      Color Colorless      Clarity Clear      Total Nucleated Cells 195 (*)     RBC Count 25 (*)    PHOSPHORUS - Abnormal    Phosphorus 2.3 (*)    ETHYL ALCOHOL LEVEL - Normal    Alcohol ethyl <0.01     INFLUENZA A/B, RSV, & SARS-COV2 PCR - Normal    Influenza A PCR Negative      Influenza B PCR Negative      RSV PCR Negative      SARS CoV2 PCR Negative     RBC AND PLATELET MORPHOLOGY    RBC Morphology Confirmed RBC Indices      Platelet Assessment        Value: Automated Count Confirmed. Platelet morphology is normal.   DIFFERENTIAL CSF    % Neutrophils 27      % Lymphocytes 62      % Monocytes/Macrophages 11     TICK-BORNE DISEASE PANEL BY PCR   LYME DISEASE TOTAL ANTIBODIES WITH  REFLEX TO CONFIRMATION   AEROBIC BACTERIAL CULTURE ROUTINE    Gram Stain Result No organisms seen     BLOOD CULTURE   MENINGITIS/ENCEPHALITIS PANEL QUAL PCR CSF   CELL COUNT WITH DIFFERENTIAL CSF     Imaging   XR Chest 2 Views   Preliminary Result   IMPRESSION: AP and lateral views of the chest were obtained. Cardiomediastinal silhouette is within normal limits. No suspicious focal pulmonary opacities. No significant pleural effusion or pneumothorax.      CT Head w/o Contrast   Final Result   IMPRESSION:   1.  No acute intracranial process.        EKG  ECG results from 08/02/24   EKG 12-lead, tracing only     Value    Systolic Blood Pressure     Diastolic Blood Pressure     Ventricular Rate 89    Atrial Rate 89    MO Interval 136    QRS Duration 74        QTc 413    P Axis 66    R AXIS -49    T Axis -4    Interpretation ECG      Sinus rhythm with Premature supraventricular complexes  Left anterior fascicular block  Nonspecific ST abnormality  Abnormal ECG  When compared with ECG of 30-Oct-2017 13:29,  Premature supraventricular complexes are now Present  Left anterior fascicular block is now Present  T wave inversion no longer evident in Inferior leads    My interpretation         Independent Interpretation   See ED Course below    ED Course      Medications Administered   Medications   sodium chloride (PF) 0.9% PF flush 3 mL (has no administration in time range)   sodium chloride (PF) 0.9% PF flush 3 mL (has no administration in time range)   dexAMETHasone (DECADRON) injection 4 mg ( Intravenous Canceled Entry 8/3/24 0003)   melatonin tablet 1 mg (1 mg Oral $Given 8/3/24 0155)   sodium chloride 0.9% BOLUS 1,000 mL (1,000 mLs Intravenous $New Bag 8/3/24 0157)   sodium chloride 0.9% BOLUS 1,000 mL (0 mLs Intravenous Stopped 8/3/24 0026)   cefTRIAXone (ROCEPHIN) 2 g vial to attach to  ml bag for ADULTS or NS 50 ml bag for PEDS (0 g Intravenous Stopped 8/3/24 0002)   ampicillin (OMNIPEN) 2 g vial to attach  to  mL bag (0 g Intravenous Stopped 8/3/24 0046)   dexAMETHasone PF (DECADRON) injection 12 mg (12 mg Intravenous $Given 8/2/24 4443)   droPERidol (INAPSINE) injection 5 mg (5 mg Intravenous $Given 8/2/24 2339)   acetaminophen (TYLENOL) Suppository 650 mg (650 mg Rectal $Given 8/3/24 0021)     Procedures   Procedures     Lumbar Puncture      Procedure: Lumbar Puncture      Indication: fever and confusion     Consent: Written from Family  Risks Discussed (including but not limited to): Infection, Bleeding, Spinal headache with possibility of spinal patch, and Temporary or permanent neurological injury     Universal Protocol: Universal protocol was followed and time out conducted just prior to starting procedure, confirming patient identity, site/side, procedure, patient position, and availability of correct equipment and implants.      Anesthesia/Sedation: Bupivacaine - 0.5%     Procedure Note:     Patient was placed in a left lateral decubitus position.  The skin overlying the L4-5 area was prepped with povidone-iodine.    The patient was medicated as above.   A 25 gauge spinal needle was used to gain access to the subarachnoid space with stylet in place.   Opening pressure was  12.5 cm H2O.   The fluid was clear.   Stylet was replaced and needle withdrawn.      Patient Status:  The patient tolerated the procedure well: Yes. There were no complications.    Discussion of Management   See ED Course below    Social Determinants of Health adding to complexity of care   None.      ED Course   Independent Interpretation / Discussion of Management / Repeat Assessments  ED Course as of 08/03/24 0205   Fri Aug 02, 2024   1955 I obtained the patient's history and examined as noted above.    2131 Updated by the patient's nurse that the patient is febrile.    2147 I rechecked the patient.    2227 CT Head w/o Contrast  I independently interpreted the patient's non-contrast head CT; reassuring against acute intracranial  hemorrhage.     Sat Aug 03, 2024   0152 XR Chest 2 Views  I independently interpreted the patient's chest x-ray; reassuring against infiltrate.     0159 I discussed the patient's presentation, workup, assessment, plan and disposition with hospitalist Dr Oliveira.       Medical Decision Making / Diagnosis     CMS Diagnoses: None    MIPS       None    MDM   Patient presenting with altered mental status.  Initially, vital signs are reassuring.  Considered differential including mental health crisis, metabolic abnormality, electrolyte disturbance, occult infection, viral syndrome, among others.  On reevaluation, once the patient was roomed, I reassessed him and he appeared increasingly delirious, now febrile, tachycardic, demonstrating significant rigors.  With patient's presentation, along with possible petechial rash on bilateral lower extremities, concern for meningitis.  Infectious workup was relatively unremarkable, with no leukocytosis, normal lactate. I did order ceftriaxone empirically.  After obtaining head CT, which was reassuring against intracranial hemorrhage or mass, I performed a lumbar puncture as above to evaluate for meningitis or encephalitis.  I also ordered ampicillin and dexamethasone empirically for bacterial meningitis.  CSF obtained during lumbar puncture.  Clear and ultimately had elevated protein, glucose, and significant number of nucleated cells with lymphocytic predominance, suggesting viral meningitis.  Shortly after lumbar puncture, the patient became agitated with delirium.  He pulled out his IV.  For this reason, he was given 5 mg of intramuscular droperidol and was placed in soft restraints.  He was admitted to hospitalist for further evaluation and management.         Disposition   The patient was admitted to the hospital.     Diagnosis     ICD-10-CM    1. Sepsis, due to unspecified organism, unspecified whether acute organ dysfunction present (H)  A41.9       2. JUSTINA (acute kidney injury)  (H24)  N17.9       3. Delirium  R41.0       4. Hyponatremia  E87.1         Scribe Disclosure:  I, Joan Bravo, am serving as a scribe at 9:50 PM on 8/2/2024 to document services personally performed by Madan Stringer MD based on my observations and the provider's statements to me.       Maadn Stringer MD  08/03/24 0205

## 2024-08-03 NOTE — PHARMACY-ADMISSION MEDICATION HISTORY
Pharmacy Intern Admission Medication History    Admission medication history is complete. The information provided in this note is only as accurate as the sources available at the time of the update.    Information Source(s): Family member, Clinic records, Hospital records, Facility ([facility]/) medication list/MAR, Prescription bottles, and CareEverywhere/SureScripts via in-person    Pertinent Information: Due to pt's confusion state, called pt's daughter,  Nela, to update PTA med list. She stated that she does not know what medication is active as some medications were old but still had pills in the bottles. I tried to confirm with the pt's recent refill visits with Pb in his chart review.    Changes made to PTA medication list:  Added: Amlodipine, atorvastatin, cephalexin  Deleted: Clomipramine, hypromellose-dextan soln, simvastatin  Unsure about quetiapine 50 mg, pt's daughter stated that pt still has bottle with pills. I could not find any refill information about this.  Unsure about lorazepam 0.5 mg. Could not find refill information about this.  Changed: Lisinopril 10 mg to 20 mg      Allergies reviewed with patient and updates made in EHR: no    Medication History Completed By: Nikole Singh 8/3/2024 8:44 AM    PTA Med List   Medication Sig Last Dose    amLODIPine (NORVASC) 5 MG tablet Take 5 mg by mouth 2 times daily Unknown    aspirin EC 81 MG EC tablet Take 1 tablet (81 mg) by mouth daily Unknown    atorvastatin (LIPITOR) 20 MG tablet Take 20 mg by mouth daily Unknown    cephALEXin (KEFLEX) 500 MG capsule Take 500 mg by mouth Take 2 capsules by mouth 1 hour before dental appointment and 2 after, total daily dose of 4 capsules. Unknown    lisinopril (ZESTRIL) 20 MG tablet Take 20 mg by mouth daily Unknown    LORazepam (ATIVAN) 0.5 MG tablet Take 1 tablet (0.5 mg) by mouth 3 times daily as needed for anxiety Unknown    metFORMIN (GLUCOPHAGE) 500 MG tablet Take 500 mg by mouth 2 times daily (with meals)  Unknown    metoprolol (LOPRESSOR) 25 MG tablet Take 1 tablet (25 mg) by mouth 2 times daily Unknown    Omeprazole (PRILOSEC PO) Take 20 mg by mouth every morning  Unknown    QUEtiapine (SEROQUEL) 50 MG tablet Take 1 tablet (50 mg) by mouth 2 times daily Unknown

## 2024-08-03 NOTE — PROGRESS NOTES
"[facility]  Hospitalist Progress Note          Assessment and Plan:       Delirium    JUSTINA (acute kidney injury) (H24)    * No resolved hospital problems. *    Today's plan:  - CBC and chemistry panel reviewed will repeat labs to monitor electrolytes and renal function also repeat blood count to monitor hemoglobin level.  Otherwise await culture results.  - Images reviewed.  Will consider MRI of the brain if no improvement.  - Medications reviewed will follow-up per ID recommendation.  - Discharge plan awaiting patient's clinical improvement and lab studies.               Interval History:     Patient was seen today for follow-up.  He is very encephalopathic I was not able to obtain any history from him.              Medications:   I have reviewed this patient's current medications               Physical Exam:   Blood pressure 135/68, pulse 112, temperature 98  F (36.7  C), temperature source Oral, resp. rate (!) 36, height 1.74 m (5' 8.5\"), weight 77.9 kg (171 lb 11.8 oz), SpO2 96%.      Vital Sign Ranges  Temperature Temp  Av.6  F (37.6  C)  Min: 97.8  F (36.6  C)  Max: 103.5  F (39.7  C)   Blood pressure Systolic (24hrs), Av , Min:118 , Max:149        Diastolic (24hrs), Av, Min:68, Max:105      Pulse Pulse  Av.9  Min: 80  Max: 145   Respirations Resp  Av.8  Min: 16  Max: 38   Pulse oximetry SpO2  Av.7 %  Min: 90 %  Max: 96 %         Intake/Output Summary (Last 24 hours) at 8/3/2024 0849  Last data filed at 8/3/2024 0839  Gross per 24 hour   Intake --   Output 1600 ml   Net -1600 ml     HEENT and neck: Pupils equal and radial  Heart: Regular rate and rhythm  Lungs: Clear  Abdomen: Plus bowel sounds no tenderness  Extremities: No edema  Skin: No visible    74-year-old patient admitted 2024 because of confusion and fever with concern of meningitis encephalitis and sepsis.    Acute toxic metabolic encephalopathy on admission  Fever with concern of sepsis and " meningitis.  The patient was admitted with confusion and fever.  CT head on admission was unremarkable  CSF fluid analysis significant for lymphocytosis  Blood culture negative to date  Tickborne, West Nile virus and Lyme testing pending.  Plan:  - Continue ceftriaxone ampicillin and vancomycin.  - Currently on Decadron.  Will DC if Gram stain culture and sensitivity negative.   - Discussed with ID will follow-up per their recommendation.  - Continue delirium treatment per protocol  - PT OT and speech therapy consulted.    Acute kidney injury on admission improving  - Continue to monitor renal function and avoid nephrotoxic medication.  Continue IV hydration.    Hyponatremia  - Continue to monitor sodium level.  Follow-up serum osmolality  Osmolality and electrolytes.  Follow-up thyroid study.    Hypophosphatemia  - Continue phosphorus replacement and monitor level.  Next    CODE STATUS: Full code  Diet: Per speech therapy  DVT prophylaxis: SCD  Disposition: Pending clinical course               Data:   All laboratory data reviewed

## 2024-08-04 ENCOUNTER — APPOINTMENT (OUTPATIENT)
Dept: CARDIOLOGY | Facility: CLINIC | Age: 74
DRG: 871 | End: 2024-08-04
Attending: INTERNAL MEDICINE
Payer: MEDICARE

## 2024-08-04 LAB
A PHAGOCYTOPH DNA BLD QL NAA+PROBE: NOT DETECTED
ALBUMIN SERPL BCG-MCNC: 3.6 G/DL (ref 3.5–5.2)
ALP SERPL-CCNC: 77 U/L (ref 40–150)
ALT SERPL W P-5'-P-CCNC: 56 U/L (ref 0–70)
ANION GAP SERPL CALCULATED.3IONS-SCNC: 9 MMOL/L (ref 7–15)
AST SERPL W P-5'-P-CCNC: 54 U/L (ref 0–45)
ATRIAL RATE - MUSE: 110 BPM
ATRIAL RATE - MUSE: 97 BPM
BABESIA DNA BLD QL NAA+PROBE: NOT DETECTED
BILIRUB SERPL-MCNC: 0.4 MG/DL
BUN SERPL-MCNC: 34.4 MG/DL (ref 8–23)
CALCIUM SERPL-MCNC: 8.1 MG/DL (ref 8.8–10.4)
CHLORIDE SERPL-SCNC: 104 MMOL/L (ref 98–107)
CREAT SERPL-MCNC: 1.26 MG/DL (ref 0.67–1.17)
DIASTOLIC BLOOD PRESSURE - MUSE: NORMAL MMHG
DIASTOLIC BLOOD PRESSURE - MUSE: NORMAL MMHG
EGFRCR SERPLBLD CKD-EPI 2021: 61 ML/MIN/1.73M2
EHRLICHIA DNA SPEC QL NAA+PROBE: NOT DETECTED
ERYTHROCYTE [DISTWIDTH] IN BLOOD BY AUTOMATED COUNT: 14 % (ref 10–15)
GLUCOSE BLDC GLUCOMTR-MCNC: 210 MG/DL (ref 70–99)
GLUCOSE BLDC GLUCOMTR-MCNC: 294 MG/DL (ref 70–99)
GLUCOSE BLDC GLUCOMTR-MCNC: 301 MG/DL (ref 70–99)
GLUCOSE BLDC GLUCOMTR-MCNC: 354 MG/DL (ref 70–99)
GLUCOSE SERPL-MCNC: 278 MG/DL (ref 70–99)
HCO3 SERPL-SCNC: 24 MMOL/L (ref 22–29)
HCT VFR BLD AUTO: 33.2 % (ref 40–53)
HGB BLD-MCNC: 11 G/DL (ref 13.3–17.7)
INTERPRETATION ECG - MUSE: NORMAL
INTERPRETATION ECG - MUSE: NORMAL
LACTATE SERPL-SCNC: 1.9 MMOL/L (ref 0.7–2)
LACTATE SERPL-SCNC: 2 MMOL/L (ref 0.7–2)
LVEF ECHO: NORMAL
MAGNESIUM SERPL-MCNC: 2.5 MG/DL (ref 1.7–2.3)
MCH RBC QN AUTO: 30.1 PG (ref 26.5–33)
MCHC RBC AUTO-ENTMCNC: 33.1 G/DL (ref 31.5–36.5)
MCV RBC AUTO: 91 FL (ref 78–100)
P AXIS - MUSE: 60 DEGREES
P AXIS - MUSE: 65 DEGREES
PHOSPHATE SERPL-MCNC: 1.9 MG/DL (ref 2.5–4.5)
PHOSPHATE SERPL-MCNC: 2.5 MG/DL (ref 2.5–4.5)
PLATELET # BLD AUTO: 123 10E3/UL (ref 150–450)
POTASSIUM SERPL-SCNC: 3.9 MMOL/L (ref 3.4–5.3)
PR INTERVAL - MUSE: 140 MS
PR INTERVAL - MUSE: 142 MS
PROT SERPL-MCNC: 6.3 G/DL (ref 6.4–8.3)
QRS DURATION - MUSE: 72 MS
QRS DURATION - MUSE: 84 MS
QT - MUSE: 328 MS
QT - MUSE: 340 MS
QTC - MUSE: 431 MS
QTC - MUSE: 443 MS
R AXIS - MUSE: -52 DEGREES
R AXIS - MUSE: -59 DEGREES
RBC # BLD AUTO: 3.65 10E6/UL (ref 4.4–5.9)
SODIUM SERPL-SCNC: 137 MMOL/L (ref 135–145)
SYSTOLIC BLOOD PRESSURE - MUSE: NORMAL MMHG
SYSTOLIC BLOOD PRESSURE - MUSE: NORMAL MMHG
T AXIS - MUSE: 46 DEGREES
T AXIS - MUSE: 7 DEGREES
VENTRICULAR RATE- MUSE: 110 BPM
VENTRICULAR RATE- MUSE: 97 BPM
WBC # BLD AUTO: 13.2 10E3/UL (ref 4–11)

## 2024-08-04 PROCEDURE — 99232 SBSQ HOSP IP/OBS MODERATE 35: CPT | Performed by: INTERNAL MEDICINE

## 2024-08-04 PROCEDURE — 120N000013 HC R&B IMCU

## 2024-08-04 PROCEDURE — 250N000009 HC RX 250: Performed by: INTERNAL MEDICINE

## 2024-08-04 PROCEDURE — 80053 COMPREHEN METABOLIC PANEL: CPT | Performed by: INTERNAL MEDICINE

## 2024-08-04 PROCEDURE — 36415 COLL VENOUS BLD VENIPUNCTURE: CPT | Performed by: INTERNAL MEDICINE

## 2024-08-04 PROCEDURE — 93306 TTE W/DOPPLER COMPLETE: CPT

## 2024-08-04 PROCEDURE — 258N000003 HC RX IP 258 OP 636

## 2024-08-04 PROCEDURE — 83605 ASSAY OF LACTIC ACID: CPT | Performed by: INTERNAL MEDICINE

## 2024-08-04 PROCEDURE — 83735 ASSAY OF MAGNESIUM: CPT | Performed by: INTERNAL MEDICINE

## 2024-08-04 PROCEDURE — 250N000011 HC RX IP 250 OP 636

## 2024-08-04 PROCEDURE — 84100 ASSAY OF PHOSPHORUS: CPT | Performed by: INTERNAL MEDICINE

## 2024-08-04 PROCEDURE — 258N000003 HC RX IP 258 OP 636: Performed by: INTERNAL MEDICINE

## 2024-08-04 PROCEDURE — 250N000011 HC RX IP 250 OP 636: Performed by: INTERNAL MEDICINE

## 2024-08-04 PROCEDURE — 85027 COMPLETE CBC AUTOMATED: CPT | Performed by: INTERNAL MEDICINE

## 2024-08-04 PROCEDURE — 250N000013 HC RX MED GY IP 250 OP 250 PS 637: Performed by: HOSPITALIST

## 2024-08-04 PROCEDURE — 93306 TTE W/DOPPLER COMPLETE: CPT | Mod: 26 | Performed by: INTERNAL MEDICINE

## 2024-08-04 RX ORDER — LORAZEPAM 0.5 MG/1
0.5 TABLET ORAL ONCE
Status: COMPLETED | OUTPATIENT
Start: 2024-08-04 | End: 2024-08-04

## 2024-08-04 RX ADMIN — DEXAMETHASONE SODIUM PHOSPHATE 12 MG: 4 INJECTION, SOLUTION INTRAMUSCULAR; INTRAVENOUS at 00:12

## 2024-08-04 RX ADMIN — CEFTRIAXONE SODIUM 2 G: 2 INJECTION, POWDER, FOR SOLUTION INTRAMUSCULAR; INTRAVENOUS at 22:50

## 2024-08-04 RX ADMIN — SODIUM PHOSPHATE, MONOBASIC, MONOHYDRATE AND SODIUM PHOSPHATE, DIBASIC, ANHYDROUS 15 MMOL: 142; 276 INJECTION, SOLUTION INTRAVENOUS at 10:14

## 2024-08-04 RX ADMIN — INSULIN ASPART 5 UNITS: 100 INJECTION, SOLUTION INTRAVENOUS; SUBCUTANEOUS at 12:51

## 2024-08-04 RX ADMIN — INSULIN ASPART 4 UNITS: 100 INJECTION, SOLUTION INTRAVENOUS; SUBCUTANEOUS at 09:19

## 2024-08-04 RX ADMIN — VANCOMYCIN HYDROCHLORIDE 1500 MG: 10 INJECTION, POWDER, LYOPHILIZED, FOR SOLUTION INTRAVENOUS at 04:19

## 2024-08-04 RX ADMIN — CEFTRIAXONE SODIUM 2 G: 2 INJECTION, POWDER, FOR SOLUTION INTRAMUSCULAR; INTRAVENOUS at 09:38

## 2024-08-04 RX ADMIN — DEXAMETHASONE SODIUM PHOSPHATE 12 MG: 4 INJECTION, SOLUTION INTRAMUSCULAR; INTRAVENOUS at 06:04

## 2024-08-04 RX ADMIN — PANTOPRAZOLE SODIUM 40 MG: 40 INJECTION, POWDER, FOR SOLUTION INTRAVENOUS at 09:19

## 2024-08-04 RX ADMIN — INSULIN ASPART 3 UNITS: 100 INJECTION, SOLUTION INTRAVENOUS; SUBCUTANEOUS at 17:52

## 2024-08-04 RX ADMIN — LORAZEPAM 0.5 MG: 0.5 TABLET ORAL at 18:34

## 2024-08-04 ASSESSMENT — ACTIVITIES OF DAILY LIVING (ADL)
ADLS_ACUITY_SCORE: 49
ADLS_ACUITY_SCORE: 48
ADLS_ACUITY_SCORE: 49
ADLS_ACUITY_SCORE: 48
ADLS_ACUITY_SCORE: 49
ADLS_ACUITY_SCORE: 50
ADLS_ACUITY_SCORE: 48
ADLS_ACUITY_SCORE: 49
ADLS_ACUITY_SCORE: 48
ADLS_ACUITY_SCORE: 50
ADLS_ACUITY_SCORE: 42
ADLS_ACUITY_SCORE: 50
ADLS_ACUITY_SCORE: 48
ADLS_ACUITY_SCORE: 50
ADLS_ACUITY_SCORE: 48
ADLS_ACUITY_SCORE: 49
ADLS_ACUITY_SCORE: 49
ADLS_ACUITY_SCORE: 48
ADLS_ACUITY_SCORE: 49

## 2024-08-04 NOTE — PLAN OF CARE
Goal Outcome Evaluation:    Orientation: Aox4, intermittent confusion/forgetful  Pain management: PRN Ativan for anxiety  Vitals/Tele: ST with PAC and PVC  IV Access/drains: PIV L  Diet: Regular  Mobility: SBA with gait belt  GI/: Cont. B&B  Wound/Skin: BLE rushes, scattered bruises and scabs.  Discharge Plan: Pending improvement       See Flow sheets for assessment

## 2024-08-04 NOTE — PROGRESS NOTES
[facility]  Infectious Disease Progress Note          Assessment and Plan:   Date of Admission:  8/2/2024  Date of Consult (When I saw the patient): 08/03/24        Assessment & Plan  Negro Aj is a 74 year old male who was admitted on 8/2/2024.      Impression: 1 74-year-old male with acute fever, confusion unclear etiology but lumbar puncture abnormal consistent with meningitis, mostly lymphocytes likely aseptic meningitis, meningitis profile negative, large differential diagnosis of infectious and noninfectious causes  2 major fever, septic like condition but procalcitonin low, blood cultures negative no obvious focal infection except the CNS process  3 diabetes mellitus  4 ATN  5 major depression and psychiatric disease     REC 1 very unlikely bacterial based on the studies discontinue Vanco will do ceftriaxone 1 more day until cultures finalized negative tomorrow  2 large differential diagnosis although statistically most likely viral, await further information and follow clinically, further workup from a neurostandpoint if not improving including MRI scan etc.  Seems significantly better, mentation okay, headache lasts no other focal symptoms and no positive microbiology              Interval History:     no new complaints and doing well; no cp, sob, n/v/d, or abd pain.  Temp down and headache better, no other focal symptoms              Medications:     Current Facility-Administered Medications   Medication Dose Route Frequency Provider Last Rate Last Admin    cefTRIAXone (ROCEPHIN) 2 g vial to attach to  ml bag for ADULTS or NS 50 ml bag for PEDS  2 g Intravenous Q12H OliveiraLucio MD   2 g at 08/04/24 0938    insulin aspart (NovoLOG) injection (RAPID ACTING)   Subcutaneous TID w/meals OliveiraLucio MD   5 Units at 08/04/24 0920    insulin aspart (NovoLOG) injection (RAPID ACTING)  1-7 Units Subcutaneous TID AC OliveiraLucio MD   4 Units at 08/04/24  "0919    insulin aspart (NovoLOG) injection (RAPID ACTING)  1-5 Units Subcutaneous At Bedtime Oliveira, Lucio Gonzalez MD   1 Units at 08/03/24 2212    pantoprazole (PROTONIX) IV push injection 40 mg  40 mg Intravenous QAM AC Oliveira, Lucio Goznalez MD   40 mg at 08/04/24 0919    sodium chloride (PF) 0.9% PF flush 3 mL  3 mL Intracatheter Q8H Oliveira, Lucio Gonzalez MD   250 mL at 08/04/24 0929    sodium phosphate 15 mmol in NS 250mL intermittent infusion  15 mmol Intravenous [facility]   15 mmol at 08/04/24 1014                  Physical Exam:   Blood pressure (!) 141/82, pulse 92, temperature 98  F (36.7  C), temperature source Oral, resp. rate (!) 32, height 1.74 m (5' 8.5\"), weight 77.9 kg (171 lb 11.8 oz), SpO2 97%.  Wt Readings from Last 2 Encounters:   08/03/24 77.9 kg (171 lb 11.8 oz)   11/14/17 81.5 kg (179 lb 11.2 oz)     Vital Signs with Ranges  Temp:  [96.6  F (35.9  C)-100.9  F (38.3  C)] 98  F (36.7  C)  Pulse:  [] 92  Resp:  [32-34] 32  BP: ()/() 141/82  SpO2:  [90 %-98 %] 97 %    Constitutional: Awake, alert, cooperative, no apparent distress     Lungs: Clear to auscultation bilaterally, no crackles or wheezing   Cardiovascular: Regular rate and rhythm, normal S1 and S2, and no murmur noted   Abdomen: Normal bowel sounds, soft, non-distended, non-tender   Skin: No rashes, no cyanosis, no edema   Other:           Data:   All microbiology laboratory data reviewed.  Recent Labs   Lab Test 08/04/24 0547 08/03/24  0603 08/02/24 2019   WBC 13.2* 9.1 9.4   HGB 11.0* 11.5* 11.8*   HCT 33.2* 34.5* 35.8*   MCV 91 89 90   * 120* 138*     Recent Labs   Lab Test 08/04/24  0547 08/03/24  0603 08/02/24 2019   CR 1.26* 1.29* 1.45*     No lab results found.  No lab results found.    Invalid input(s): \"UC\"     "

## 2024-08-04 NOTE — PROGRESS NOTES
"[facility]  Hospitalist Progress Note          Assessment and Plan:       Delirium    JUSTINA (acute kidney injury) (H24)    * No resolved hospital problems. *    Today's plan:  - CBC and chemistry panel reviewed will repeat labs to monitor electrolytes and renal function also repeat blood count to monitor hemoglobin level.  Otherwise await culture results.  - Images reviewed.  Will consider MRI of the brain if no improvement.  - Medications reviewed will follow-up per ID recommendation.Stop Decadron since all preliminary results negative.  - Discharge plan awaiting patient's clinical improvement and lab studies.             Interval History:     no complaints and doing well; no cp, sob, n/v/d, or abd pain.              Medications:   I have reviewed this patient's current medications               Physical Exam:   Blood pressure (!) 141/82, pulse 92, temperature 98  F (36.7  C), temperature source Oral, resp. rate (!) 32, height 1.74 m (5' 8.5\"), weight 77.9 kg (171 lb 11.8 oz), SpO2 97%.      Vital Sign Ranges  Temperature Temp  Av.4  F (36.9  C)  Min: 96.6  F (35.9  C)  Max: 100.9  F (38.3  C)   Blood pressure Systolic (24hrs), Av , Min:83 , Max:157        Diastolic (24hrs), Av, Min:47, Max:100      Pulse Pulse  Av.5  Min: 57  Max: 128   Respirations Resp  Av.3  Min: 32  Max: 34   Pulse oximetry SpO2  Av.1 %  Min: 90 %  Max: 98 %         Intake/Output Summary (Last 24 hours) at 2024 0938  Last data filed at 2024 0535  Gross per 24 hour   Intake 830 ml   Output 700 ml   Net 130 ml     HEENT and neck: Pupils equal and radial  Heart: Regular rate and rhythm  Lungs: Clear  Abdomen: Plus bowel sounds no tenderness  Extremities: No edema  Skin: No visible    74-year-old patient admitted 2024 because of confusion and fever with concern of meningitis encephalitis and sepsis.     Acute toxic metabolic encephalopathy on admission  Fever with concern of sepsis " and meningitis.  The patient was admitted with confusion and fever.  CT head on admission was unremarkable  CSF fluid analysis showed lymphocytosis  Blood culture negative to date  Tickborne, West Nile virus and Lyme testing pending.  Plan:  - Continue ceftriaxone ampicillin and vancomycin.  - Stop Decadron since all preliminary results negative.  - Discussed with ID will follow-up per their recommendation.  - Continue delirium treatment per protocol  - PT OT and speech therapy consulted.     Acute kidney injury on admission improving  - Continue to monitor renal function and avoid nephrotoxic medication.  Continue IV hydration.     Hyponatremia  - Continue to monitor sodium level.  Follow-up serum osmolality  Osmolality and electrolytes.  Follow-up thyroid study.    Hypophosphatemia  - Continue phosphorus replacement and monitor level.  Next     CODE STATUS: Full code  Diet: Per speech therapy  DVT prophylaxis: SCD  Disposition: Pending clinical course             Data:   All laboratory data reviewed

## 2024-08-04 NOTE — PLAN OF CARE
Goal Outcome Evaluation:           Overall Patient Progress: no changeOverall Patient Progress: no change    Outcome Evaluation: Patient remains alert, disorientated to situation. Patient slept well between cares. Afebrile overnight, but patient very diaphoretic throughout night. Rigors at times, improved with bedding changes. BP soft overnight, tachy at times when patient is anxoius. HRs in 120s-140s at times, nonsustains, no prns needed. Patient having coarse LS in LLs, denies cough, O2 stable on RA. Patient remains up with SBA and tolerating regular diet, poor appetite. Plan for ECHO today, ID following.

## 2024-08-05 ENCOUNTER — APPOINTMENT (OUTPATIENT)
Dept: OCCUPATIONAL THERAPY | Facility: CLINIC | Age: 74
DRG: 871 | End: 2024-08-05
Attending: STUDENT IN AN ORGANIZED HEALTH CARE EDUCATION/TRAINING PROGRAM
Payer: MEDICARE

## 2024-08-05 LAB
ANION GAP SERPL CALCULATED.3IONS-SCNC: 10 MMOL/L (ref 7–15)
BUN SERPL-MCNC: 23.8 MG/DL (ref 8–23)
CALCIUM SERPL-MCNC: 7.7 MG/DL (ref 8.8–10.4)
CHLORIDE SERPL-SCNC: 100 MMOL/L (ref 98–107)
CK SERPL-CCNC: 354 U/L (ref 39–308)
CREAT SERPL-MCNC: 1.12 MG/DL (ref 0.67–1.17)
EGFRCR SERPLBLD CKD-EPI 2021: 70 ML/MIN/1.73M2
ERYTHROCYTE [DISTWIDTH] IN BLOOD BY AUTOMATED COUNT: 14.2 % (ref 10–15)
GLUCOSE BLDC GLUCOMTR-MCNC: 155 MG/DL (ref 70–99)
GLUCOSE BLDC GLUCOMTR-MCNC: 159 MG/DL (ref 70–99)
GLUCOSE BLDC GLUCOMTR-MCNC: 194 MG/DL (ref 70–99)
GLUCOSE BLDC GLUCOMTR-MCNC: 226 MG/DL (ref 70–99)
GLUCOSE BLDC GLUCOMTR-MCNC: 232 MG/DL (ref 70–99)
GLUCOSE SERPL-MCNC: 177 MG/DL (ref 70–99)
HCO3 SERPL-SCNC: 24 MMOL/L (ref 22–29)
HCT VFR BLD AUTO: 32.9 % (ref 40–53)
HGB BLD-MCNC: 10.7 G/DL (ref 13.3–17.7)
MAGNESIUM SERPL-MCNC: 2 MG/DL (ref 1.7–2.3)
MCH RBC QN AUTO: 30 PG (ref 26.5–33)
MCHC RBC AUTO-ENTMCNC: 32.5 G/DL (ref 31.5–36.5)
MCV RBC AUTO: 92 FL (ref 78–100)
PATH REPORT.COMMENTS IMP SPEC: NORMAL
PATH REPORT.COMMENTS IMP SPEC: NORMAL
PATH REPORT.FINAL DX SPEC: NORMAL
PATH REPORT.MICROSCOPIC SPEC OTHER STN: NORMAL
PATH REPORT.MICROSCOPIC SPEC OTHER STN: NORMAL
PATH REPORT.RELEVANT HX SPEC: NORMAL
PHOSPHATE SERPL-MCNC: 2.5 MG/DL (ref 2.5–4.5)
PLATELET # BLD AUTO: 136 10E3/UL (ref 150–450)
POTASSIUM SERPL-SCNC: 3.6 MMOL/L (ref 3.4–5.3)
RBC # BLD AUTO: 3.57 10E6/UL (ref 4.4–5.9)
SODIUM SERPL-SCNC: 134 MMOL/L (ref 135–145)
WBC # BLD AUTO: 11.9 10E3/UL (ref 4–11)

## 2024-08-05 PROCEDURE — 36415 COLL VENOUS BLD VENIPUNCTURE: CPT | Performed by: INTERNAL MEDICINE

## 2024-08-05 PROCEDURE — 85060 BLOOD SMEAR INTERPRETATION: CPT | Performed by: PATHOLOGY

## 2024-08-05 PROCEDURE — 84100 ASSAY OF PHOSPHORUS: CPT | Performed by: INTERNAL MEDICINE

## 2024-08-05 PROCEDURE — 85027 COMPLETE CBC AUTOMATED: CPT | Performed by: INTERNAL MEDICINE

## 2024-08-05 PROCEDURE — 83735 ASSAY OF MAGNESIUM: CPT | Performed by: INTERNAL MEDICINE

## 2024-08-05 PROCEDURE — 97535 SELF CARE MNGMENT TRAINING: CPT | Mod: GO

## 2024-08-05 PROCEDURE — 99222 1ST HOSP IP/OBS MODERATE 55: CPT

## 2024-08-05 PROCEDURE — 99232 SBSQ HOSP IP/OBS MODERATE 35: CPT | Performed by: HOSPITALIST

## 2024-08-05 PROCEDURE — 82550 ASSAY OF CK (CPK): CPT | Performed by: HOSPITALIST

## 2024-08-05 PROCEDURE — 82374 ASSAY BLOOD CARBON DIOXIDE: CPT | Performed by: INTERNAL MEDICINE

## 2024-08-05 PROCEDURE — 120N000001 HC R&B MED SURG/OB

## 2024-08-05 PROCEDURE — 250N000013 HC RX MED GY IP 250 OP 250 PS 637

## 2024-08-05 PROCEDURE — 258N000003 HC RX IP 258 OP 636: Performed by: INTERNAL MEDICINE

## 2024-08-05 PROCEDURE — 250N000013 HC RX MED GY IP 250 OP 250 PS 637: Performed by: HOSPITALIST

## 2024-08-05 PROCEDURE — 99232 SBSQ HOSP IP/OBS MODERATE 35: CPT | Performed by: INTERNAL MEDICINE

## 2024-08-05 RX ORDER — PANTOPRAZOLE SODIUM 40 MG/1
40 TABLET, DELAYED RELEASE ORAL
Status: DISCONTINUED | OUTPATIENT
Start: 2024-08-05 | End: 2024-08-11 | Stop reason: HOSPADM

## 2024-08-05 RX ORDER — ATORVASTATIN CALCIUM 20 MG/1
20 TABLET, FILM COATED ORAL DAILY
Status: DISCONTINUED | OUTPATIENT
Start: 2024-08-05 | End: 2024-08-11 | Stop reason: HOSPADM

## 2024-08-05 RX ORDER — METOPROLOL TARTRATE 25 MG/1
25 TABLET, FILM COATED ORAL 2 TIMES DAILY
Status: DISCONTINUED | OUTPATIENT
Start: 2024-08-05 | End: 2024-08-11 | Stop reason: HOSPADM

## 2024-08-05 RX ADMIN — INSULIN ASPART 2 UNITS: 100 INJECTION, SOLUTION INTRAVENOUS; SUBCUTANEOUS at 18:54

## 2024-08-05 RX ADMIN — INSULIN ASPART 1 UNITS: 100 INJECTION, SOLUTION INTRAVENOUS; SUBCUTANEOUS at 09:11

## 2024-08-05 RX ADMIN — SODIUM CHLORIDE, POTASSIUM CHLORIDE, SODIUM LACTATE AND CALCIUM CHLORIDE 1000 ML: 600; 310; 30; 20 INJECTION, SOLUTION INTRAVENOUS at 00:33

## 2024-08-05 RX ADMIN — PANTOPRAZOLE SODIUM 40 MG: 40 TABLET, DELAYED RELEASE ORAL at 09:10

## 2024-08-05 RX ADMIN — INSULIN ASPART 2 UNITS: 100 INJECTION, SOLUTION INTRAVENOUS; SUBCUTANEOUS at 14:44

## 2024-08-05 RX ADMIN — METOPROLOL TARTRATE 25 MG: 25 TABLET, FILM COATED ORAL at 21:30

## 2024-08-05 RX ADMIN — ATORVASTATIN CALCIUM 20 MG: 20 TABLET, FILM COATED ORAL at 17:08

## 2024-08-05 ASSESSMENT — ACTIVITIES OF DAILY LIVING (ADL)
ADLS_ACUITY_SCORE: 43
ADLS_ACUITY_SCORE: 49
DEPENDENT_IADLS:: INDEPENDENT
ADLS_ACUITY_SCORE: 49
ADLS_ACUITY_SCORE: 49
ADLS_ACUITY_SCORE: 43
ADLS_ACUITY_SCORE: 49
ADLS_ACUITY_SCORE: 43
ADLS_ACUITY_SCORE: 49
ADLS_ACUITY_SCORE: 49
ADLS_ACUITY_SCORE: 43
ADLS_ACUITY_SCORE: 49
ADLS_ACUITY_SCORE: 43
ADLS_ACUITY_SCORE: 43
ADLS_ACUITY_SCORE: 49

## 2024-08-05 NOTE — PLAN OF CARE
Problem: Adult Inpatient Plan of Care  Goal: Plan of Care Review  Description: The Plan of Care Review/Shift note should be completed every shift.  The Outcome Evaluation is a brief statement about your assessment that the patient is improving, declining, or no change.  This information will be displayed automatically on your shift  note.  Flowsheets (Taken 8/4/2024 2238)  Plan of Care Reviewed With: patient   Goal Outcome Evaluation:      Plan of Care Reviewed With: patient    Neuro: Patient is A&O x 3-4, intermittent confused and forgetful.  Pulmonary: LSC on RA  CV: ST with PAC and PVC  Mobility: up with SBA with gait belt  GI/: Cont. B&B, tolerate ordered diet  Wound/Skin: BLE rushes, scattered bruises and scabs.  Continue with plan of care.

## 2024-08-05 NOTE — PROGRESS NOTES
[facility]    Medicine Progress Note - Hospitalist Service    Date of Admission:  8/2/2024    Assessment & Plan     This is a 74-year-old male with medical history which includes depression, OCD, diabetes mellitus on metformin who was brought to the hospital on 8/3/2024 by family with concerns of lethargy and confusion and admitted to the hospital with sepsis and encephalopathy        Likely viral meningitis  Severe sepsis-resolved  -On admission presented with fever, confusion  -Initial lab work on admit showed sodium of 129, chloride of 91, creatinine of 1.45 with GFR of 51, total bilirubin of 1.3, CK3 2 3, glucose 163 and ALT AST and alkaline phosphatase normal, lactic acid of 1.4  -UA on admit was negative  -COVID-19, influenza and RSV negative  -CT head 8/2-normal  -Chest x-ray 8/3-no evidence of any pneumonia  -Blood cultures 8/2-negative   --Tick panel negative  -LP on admit-glucose of 79, protein elevated at 56.3, total nucleated cells 195, RBC 25  --Meningitis panel negative  -CSF aerobic bacterial culture-negative  -West Nile PCR pending  -On admit patient was started on broad-spectrum antibiotics and ID was consulted  -Antibiotic changed during the course of hospital stay along with steroids  - on exam does not seem to have any neck rigidity or stiffness and his headaches have significantly improved  -  -Patient seen by infectious disease team and antibiotics have been stopped and no further infectious disease follow-up needed      Hyponatremia-improved and hypochloremia-resolved  -Sodium on admit was 129 with chloride of 91  -Unclear if patient was given IV fluids but labs done on 8/3 AM showed serum osmolality of 290, urine sodium of 37 and urine osmolality of 370 with TSH of 0.53  -Patient was started on IV fluids and his sodium is improved to 134 with chloride of 100 and likely due to dehydration    Elevated creatinine on CKD stage III  -Baseline creatinine is normal at  1.04-1.28  -Creatinine on admit was 1.45 and likely due to dehydration due to underlying sepsis and was given IV fluids and creatinine has normalized to baseline to 1.    Chronic anemia  Acute thrombocytopenia likely due to underlying sepsis  -Baseline hemoglobin in EMR-10.5-11.8  -Hemoglobin on admit was 11.8 with platelet count of 138  -Hemoglobin-11.8-10.7  -Platelet [id number]-[phone]  -Will monitor closely    Diabetes mellitus  -PTA regimen includes metformin  -Continue with sliding scale insulin    History of OCD and depression  History of ECT treatment in 2017  -PTA regimen: Seroquel 50 twice daily, Ativan 0.5 mg 3 times daily as needed  -Psychiatry team consulted    Hypertension  Hyperlipidemia  -PTA regimen as per pharmacology: Amlodipine 5 mg twice daily, aspirin 81 mg, atorvastatin 20 mg daily, lisinopril 20 mg daily, metoprolol 25 twice daily  -Blood pressure is stable and will introduce atorvastatin and metoprolol and further medications can be reintroduced on 8/6 AM pending blood pressure levels    GERD  -PTA regimen includes 20 mg omeprazole    Physical deconditioning  -Seen by therapy teams and they have recommended TCU    Family communication   - I did speak at 334 pm at adrianne [phone] and she was updated             Diet: Combination Diet Regular Diet Adult    DVT Prophylaxis: Pneumatic Compression Devices  Spencer Catheter: Not present  Lines: None     Cardiac Monitoring: ACTIVE order. Indication: Tachyarrhythmias, acute (48 hours)  Code Status: Full Code      Clinically Significant Risk Factors          # Hypocalcemia: Lowest Ca = 7.7 mg/dL in last 2 days, will monitor and replace as appropriate       # Thrombocytopenia: Lowest platelets = 123 in last 2 days, will monitor for bleeding   # Hypertension: Noted on problem list           # DMII: A1C = 7.3 % (Ref range: <5.7 %) within past 6 months, PRESENT ON ADMISSION  # Overweight: Estimated body mass index is 25.73 kg/m  as calculated from the  "following:    Height as of this encounter: 1.74 m (5' 8.5\").    Weight as of this encounter: 77.9 kg (171 lb 11.8 oz)., PRESENT ON ADMISSION            Disposition Plan     Medically Ready for Discharge: Anticipated Tomorrow             Manvasiliy MD Beth  Hospitalist Service  [facility]  Securely message with [employer] (more info)  Text page via [employer] Paging/Directory     This note was completed in part using dictation via the Dragon voice recognition software. Some word and grammatical errors may occur and must be interpreted in the appropriate clinical context. If there are any questions pertaining to this issue, please contact me for further clarification.      I am off service from tomorrow morning and his ongoing care will be taken over by my hospital medicine team colleagues  ______________________________________________________________________    Interval History     -Reviewed events and patient was sitting in the chair and denies any neck rigidity, no nausea, no vomiting and has been tolerating the diet  -He denies any acute headaches when I saw him and headaches have significantly improved  -Tolerating diet well  -Did mention that he will want to go home when discussed disposition  -Did give permission to talk to his daughter  -Did verify his medications    Physical Exam   Vital Signs: Temp: 98.5  F (36.9  C) Temp src: Oral BP: 134/86 Pulse: 67   Resp: 20 SpO2: 90 % O2 Device: None (Room air)    Weight: 171 lbs 11.81 oz          General: aox2-3  HEENT: Head is atraumatic,  Neck: Neck is supple   Respiratory: Lungs are clear to auscultation bilaterally with no wheeze or crackles   Cardiovascular: Regular rate , S1 and S2 normal with no murmer or rubs or gallops  Abdomen:   soft , non tender , non distended and bowel sound present   Skin: No skin rashes or lesions to inspection or palpation.  Neurologic: Higher functions are within normal limits. No obvious defects in speech, language " and memory. No facial droop  Musculoskeletal: Normal Range of motion over upper and lower extremities bilaterally   Psychiatric: cooperative      Medical Decision Making       Time spent in care of patient is 47 minutes and I reviewed labs including CBC, basic metabolic panel, consulting team notes, discussed plan of care in detail with the patient, nursing staff and family and with social work team      Data     I have personally reviewed the following data over the past 24 hrs:    11.9 (H)  \   10.7 (L)   / 136 (L)     134 (L) 100 23.8 (H) /  177 (H)   3.6 24 1.12 \     Procal: N/A CRP: N/A Lactic Acid: 2.0         Imaging results reviewed over the past 24 hrs:   No results found for this or any previous visit (from the past 24 hour(s)).

## 2024-08-05 NOTE — PROGRESS NOTES
[facility]  Infectious Disease Progress Note          Assessment and Plan:   Date of Admission:  8/2/2024  Date of Consult (When I saw the patient): 08/03/24        Assessment & Plan  Negro Aj is a 74 year old male who was admitted on 8/2/2024.      Impression: 1 74-year-old male with acute fever, confusion unclear etiology but lumbar puncture abnormal consistent with meningitis, mostly lymphocytes likely aseptic meningitis, meningitis profile negative, large differential diagnosis of infectious and noninfectious causes  2 major fever, septic like condition but procalcitonin low, blood cultures negative no obvious focal infection except the CNS process  3 diabetes mellitus  4 ATN  5 major depression and psychiatric disease     REC 1 very unlikely bacterial  Antibiotics stopped  2 large differential diagnosis ,statistically most likely viral, improved so holding on a bigger workup  Seems significantly better, mentation okay, headache lasts no other focal symptoms and no positive microbiology   okay disposition, no further ID follow-up needed              Interval History:     no new complaints and doing well; no cp, sob, n/v/d, or abd pain.  Temp down and headache better, no other focal symptoms micro all negative              Medications:     Current Facility-Administered Medications   Medication Dose Route Frequency Provider Last Rate Last Admin    insulin aspart (NovoLOG) injection (RAPID ACTING)   Subcutaneous TID w/meals Oliveira, Lucio Gonzalez MD   4 Units at 08/05/24 0911    insulin aspart (NovoLOG) injection (RAPID ACTING)  1-7 Units Subcutaneous TID AC Oliveira, Lucio Gonzalez MD   1 Units at 08/05/24 0911    insulin aspart (NovoLOG) injection (RAPID ACTING)  1-5 Units Subcutaneous At Bedtime Oliveira, Lucio Gonzalez MD   2 Units at 08/04/24 2315    pantoprazole (PROTONIX) EC tablet 40 mg  40 mg Oral QAM AC Jhoana Holland, RPH   40 mg at 08/05/24 0910    sodium chloride (PF) 0.9%  "PF flush 3 mL  3 mL Intracatheter Q8H Oliveira, Lucio Gonzalez MD   3 mL at 08/05/24 0412                  Physical Exam:   Blood pressure 123/79, pulse 75, temperature 98.5  F (36.9  C), temperature source Oral, resp. rate 18, height 1.74 m (5' 8.5\"), weight 77.9 kg (171 lb 11.8 oz), SpO2 96%.  Wt Readings from Last 2 Encounters:   08/03/24 77.9 kg (171 lb 11.8 oz)   11/14/17 81.5 kg (179 lb 11.2 oz)     Vital Signs with Ranges  Temp:  [97.8  F (36.6  C)-99.5  F (37.5  C)] 98.5  F (36.9  C)  Pulse:  [] 75  Resp:  [18-33] 18  BP: (123-149)/() 123/79  SpO2:  [90 %-98 %] 96 %    Constitutional: Awake, alert, cooperative, no apparent distress     Lungs: Clear to auscultation bilaterally, no crackles or wheezing   Cardiovascular: Regular rate and rhythm, normal S1 and S2, and no murmur noted   Abdomen: Normal bowel sounds, soft, non-distended, non-tender   Skin: No rashes, no cyanosis, no edema   Other:           Data:   All microbiology laboratory data reviewed.  Recent Labs   Lab Test 08/05/24  0712 08/04/24  0547 08/03/24  0603   WBC 11.9* 13.2* 9.1   HGB 10.7* 11.0* 11.5*   HCT 32.9* 33.2* 34.5*   MCV 92 91 89   * 123* 120*     Recent Labs   Lab Test 08/05/24  0712 08/04/24  0547 08/03/24  0603   CR 1.12 1.26* 1.29*     No lab results found.  No lab results found.    Invalid input(s): \"UC\"     "

## 2024-08-05 NOTE — CONSULTS
"      Initial Psychiatric Consult   Consult date: August 5, 2024         Reason for Consult, requesting source:    Worsening OCD and lability   Requesting source: Ana Luisa Campos    Labs and imaging reviewed. Patient seen and evaluated by CONCEPCIÓN Jung CNP          HPI:   Negro Aj is a 74 year old male who was admitted on 8/2/2024 with confusion and fever with concern for meningitis encephalitis and sepsis.     On exam, he was sleeping in his chair. He awoke and was able to participate in evaluation. He states that his OCD has been well controlled since he received ECT at [facility] in 2017. Reports just being on Seroquel and doing well, has had more anxiety recently related to financial stressor that is putting strain on his relationship with his daughter. He denies severe depression, denies SI, hi , avh. States he recently saw his well established psychiatrist at [facility] about a month ago and no changes to his regimen was made.         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; OCD  Last psychiatrically hospitalized at [facility] in 2017 in which he received ECT; 1 prior admission to that at [facility]     Prior trials include clomipramine, perphenazine as well as Paxil and Zoloft     Was diagnosed with OCD in early 2000s -- obsessions were about ability to trust self, compulsions were \"tests\" of trust         Substance Use and History:   None documented         Past Medical History:   PAST MEDICAL HISTORY:   Past Medical History:   Diagnosis Date    Anxiety     Depression with anxiety     High cholesterol     Hypertension     newly treating in 2016    Knee pain, right     OCD (obsessive compulsive disorder)        PAST SURGICAL HISTORY:   Past Surgical History:   Procedure Laterality Date    ARTHROPLASTY KNEE Right 2/9/2016    Procedure: ARTHROPLASTY KNEE;  Surgeon: Tiera Conteh MD;  Location: [facility] OR    ARTHROSCOPY KNEE " Right              Family History:   FAMILY HISTORY:   Family History   Problem Relation Age of Onset    Depression Mother     Anxiety Disorder Mother     Bipolar Disorder Mother     Bipolar Disorder Sister     Depression Sister     Depression Sister        Family Psychiatric History: Includes depression, anxiety and bipolar disorder in first-degree relatives         Social History:   SOCIAL HISTORY:   Social History     Tobacco Use    Smoking status: Never    Smokeless tobacco: Not on file   Substance Use Topics    Alcohol use: Yes     Comment: occasional       Lives at home with Nela thompson          Physical ROS:   The 10 point Review of Systems is negative other than noted in the HPI or here.           Medications:     Current Facility-Administered Medications   Medication Dose Route Frequency Provider Last Rate Last Admin    insulin aspart (NovoLOG) injection (RAPID ACTING)   Subcutaneous TID w/meals Tee, Lucio Gonzalez MD   4 Units at 08/05/24 0911    insulin aspart (NovoLOG) injection (RAPID ACTING)  1-7 Units Subcutaneous TID AC Lucio Oliveira MD   1 Units at 08/05/24 0911    insulin aspart (NovoLOG) injection (RAPID ACTING)  1-5 Units Subcutaneous At Bedtime Oliveira, Lucio Gonzalez MD   2 Units at 08/04/24 2315    pantoprazole (PROTONIX) EC tablet 40 mg  40 mg Oral QAM AC Jhoana Holland, RP   40 mg at 08/05/24 0910    sodium chloride (PF) 0.9% PF flush 3 mL  3 mL Intracatheter Q8H Oliveira, Lucio Gonzalez MD   3 mL at 08/05/24 1140              Allergies:     Allergies   Allergen Reactions    Seasonal Allergies           Labs:     Recent Results (from the past 48 hour(s))   Magnesium    Collection Time: 08/03/24  4:28 PM   Result Value Ref Range    Magnesium 2.8 (H) 1.7 - 2.3 mg/dL   Glucose by meter    Collection Time: 08/03/24  4:58 PM   Result Value Ref Range    GLUCOSE BY METER POCT 316 (H) 70 - 99 mg/dL   Glucose by meter    Collection Time: 08/03/24 10:11 PM   Result Value Ref Range    GLUCOSE BY  METER POCT 203 (H) 70 - 99 mg/dL   Glucose by meter    Collection Time: 08/04/24  2:04 AM   Result Value Ref Range    GLUCOSE BY METER POCT 210 (H) 70 - 99 mg/dL   CBC with platelets    Collection Time: 08/04/24  5:47 AM   Result Value Ref Range    WBC Count 13.2 (H) 4.0 - 11.0 10e3/uL    RBC Count 3.65 (L) 4.40 - 5.90 10e6/uL    Hemoglobin 11.0 (L) 13.3 - 17.7 g/dL    Hematocrit 33.2 (L) 40.0 - 53.0 %    MCV 91 78 - 100 fL    MCH 30.1 26.5 - 33.0 pg    MCHC 33.1 31.5 - 36.5 g/dL    RDW 14.0 10.0 - 15.0 %    Platelet Count 123 (L) 150 - 450 10e3/uL   Comprehensive metabolic panel    Collection Time: 08/04/24  5:47 AM   Result Value Ref Range    Sodium 137 135 - 145 mmol/L    Potassium 3.9 3.4 - 5.3 mmol/L    Carbon Dioxide (CO2) 24 22 - 29 mmol/L    Anion Gap 9 7 - 15 mmol/L    Urea Nitrogen 34.4 (H) 8.0 - 23.0 mg/dL    Creatinine 1.26 (H) 0.67 - 1.17 mg/dL    GFR Estimate 61 >60 mL/min/1.73m2    Calcium 8.1 (L) 8.8 - 10.4 mg/dL    Chloride 104 98 - 107 mmol/L    Glucose 278 (H) 70 - 99 mg/dL    Alkaline Phosphatase 77 40 - 150 U/L    AST 54 (H) 0 - 45 U/L    ALT 56 0 - 70 U/L    Protein Total 6.3 (L) 6.4 - 8.3 g/dL    Albumin 3.6 3.5 - 5.2 g/dL    Bilirubin Total 0.4 <=1.2 mg/dL   Phosphorus    Collection Time: 08/04/24  5:47 AM   Result Value Ref Range    Phosphorus 1.9 (L) 2.5 - 4.5 mg/dL   Magnesium    Collection Time: 08/04/24  5:47 AM   Result Value Ref Range    Magnesium 2.5 (H) 1.7 - 2.3 mg/dL   Lactic Acid Whole Blood w/ 1x repeat in 2 hrs when >2    Collection Time: 08/04/24  5:47 AM   Result Value Ref Range    Lactic Acid, Initial 1.9 0.7 - 2.0 mmol/L   Echocardiogram Complete    Collection Time: 08/04/24  9:03 AM   Result Value Ref Range    LVEF  50-55%    Glucose by meter    Collection Time: 08/04/24  9:07 AM   Result Value Ref Range    GLUCOSE BY METER POCT 301 (H) 70 - 99 mg/dL   Glucose by meter    Collection Time: 08/04/24 11:23 AM   Result Value Ref Range    GLUCOSE BY METER POCT 354 (H) 70 - 99  mg/dL   Phosphorus    Collection Time: 08/04/24  3:24 PM   Result Value Ref Range    Phosphorus 2.5 2.5 - 4.5 mg/dL   Glucose by meter    Collection Time: 08/04/24 11:14 PM   Result Value Ref Range    GLUCOSE BY METER POCT 294 (H) 70 - 99 mg/dL   Lactic Acid Whole Blood w/ 1x repeat in 2 hrs when >2    Collection Time: 08/04/24 11:21 PM   Result Value Ref Range    Lactic Acid, Initial 2.0 0.7 - 2.0 mmol/L   Glucose by meter    Collection Time: 08/05/24  2:07 AM   Result Value Ref Range    GLUCOSE BY METER POCT 226 (H) 70 - 99 mg/dL   Glucose by meter    Collection Time: 08/05/24  6:35 AM   Result Value Ref Range    GLUCOSE BY METER POCT 159 (H) 70 - 99 mg/dL   Magnesium    Collection Time: 08/05/24  7:12 AM   Result Value Ref Range    Magnesium 2.0 1.7 - 2.3 mg/dL   CBC with platelets    Collection Time: 08/05/24  7:12 AM   Result Value Ref Range    WBC Count 11.9 (H) 4.0 - 11.0 10e3/uL    RBC Count 3.57 (L) 4.40 - 5.90 10e6/uL    Hemoglobin 10.7 (L) 13.3 - 17.7 g/dL    Hematocrit 32.9 (L) 40.0 - 53.0 %    MCV 92 78 - 100 fL    MCH 30.0 26.5 - 33.0 pg    MCHC 32.5 31.5 - 36.5 g/dL    RDW 14.2 10.0 - 15.0 %    Platelet Count 136 (L) 150 - 450 10e3/uL   Basic metabolic panel    Collection Time: 08/05/24  7:12 AM   Result Value Ref Range    Sodium 134 (L) 135 - 145 mmol/L    Potassium 3.6 3.4 - 5.3 mmol/L    Chloride 100 98 - 107 mmol/L    Carbon Dioxide (CO2) 24 22 - 29 mmol/L    Anion Gap 10 7 - 15 mmol/L    Urea Nitrogen 23.8 (H) 8.0 - 23.0 mg/dL    Creatinine 1.12 0.67 - 1.17 mg/dL    GFR Estimate 70 >60 mL/min/1.73m2    Calcium 7.7 (L) 8.8 - 10.4 mg/dL    Glucose 177 (H) 70 - 99 mg/dL   Phosphorus    Collection Time: 08/05/24  7:12 AM   Result Value Ref Range    Phosphorus 2.5 2.5 - 4.5 mg/dL   CK total    Collection Time: 08/05/24  7:12 AM   Result Value Ref Range     (H) 39 - 308 U/L          Physical and Psychiatric Examination:     /86 (BP Location: Left arm)   Pulse 97   Temp 99.3  F (37.4  C)  "(Axillary)   Resp 18   Ht 1.74 m (5' 8.5\")   Wt 77.9 kg (171 lb 11.8 oz)   SpO2 99%   BMI 25.73 kg/m    Weight is 171 lbs 11.81 oz  Body mass index is 25.73 kg/m .    Physical Exam:  I have reviewed the physical exam as documented by by the medical team and agree with findings and assessment and have no additional findings to add at this time.    Mental Status Exam:    Appearance: awake, alert, adequately groomed, and dressed in hospital scrubs  Attitude:  cooperative  Eye Contact:  good  Mood:  better  Affect:  appropriate and in normal range and mood congruent  Speech:  clear, coherent  Language: Fluent in english   Psychomotor Behavior:  no evidence of tardive dyskinesia, dystonia, or tics  Thought Process:  logical, linear, and goal oriented  Associations:  no loose associations  Thought Content:  no evidence of suicidal ideation or homicidal ideation and no evidence of psychotic thought  Insight:  fair  Judgement:  fair  Oriented to:  time, person, and place  Attention Span and Concentration:  intact  Recent and Remote Memory:  intact  Fund of Knowledge: Appropriate   Gait and Station: baseline                DSM-5 Diagnosis:   Delirium   ROHAN  MDD, reccurent, moderate           Assessment:   Negro is a 74 year old male with a history of OCD, severe depression with psychosis and history of ECT treatments who presents with confusion and recent emotional lability found to have meningitis and sepsis. He attributes his recent moods to situational stressors and delirium from medical condition which is likely. He states he is doing well on seroquel 50mg BID and recently saw his outpatient psychiatrist who knows him well for decades and no changes were made.           Summary of Recommendations:     Continue Seroquel 50mg BID     Delirium precautions:  Up during the day with lights on  Lights off at night, avoid interruptions during the night as much as possible  Family visits  Encourage wearing " glasses  Reorientation  Avoid opioids, benzodiazepines, anticholinergics as much as possible.   Continue to ensure proper nutrition, fluid and electrolyte balance. Monitor for infections, hypoxia, metabolic derangements, or other causes of delirium.     Follow up with outpatient psychiatrist after discharge       Aurora Ansari, [id number]  Consult/Liaison Psychiatry   [facility]

## 2024-08-05 NOTE — PLAN OF CARE
Goal Outcome Evaluation:  [id number]       Pt is AOX3-4, with intermittent confusions. AVSS on RA, except for episodes of HTN. Pt is also on Droplet Precaution for Meningitis. Tele is SR. LS clear in upper lobes, diminished on lower. Up to the bedside with A1 with GBW. +BS, -BM, low urine output. Tolerating regular diet. With 1PIV on R AC X SL. Noted scattered rashes on BLE, scattered bruises on BUE, scattered scabs, otherwise, skin is intact. ID, Hospitalist are following. For Psych consult. For PT, OT, SLP consult. Continue to monitor.      Sepsis protocol fired up, Lactic Acid result came back 2.0. Informed Dr. Oliveira. Received order from Dr. Oliveira to give LR 1000 mL bolus X 1hr.

## 2024-08-05 NOTE — CONSULTS
Care Management Initial Consult    General Information  Assessment completed with: Children, Nela  Type of CM/SW Visit: Initial Assessment    Primary Care Provider verified and updated as needed: Yes   Readmission within the last 30 days: no previous admission in last 30 days      Reason for Consult: discharge planning  Advance Care Planning:            Communication Assessment  Patient's communication style: spoken language (English or Bilingual)             Cognitive  Cognitive/Neuro/Behavioral: WDL  Level of Consciousness: alert  Arousal Level: arouses to voice, arouses to touch/gentle shaking  Orientation: situation  Mood/Behavior: anxious  Best Language: 0 - No aphasia  Speech: clear, spontaneous, logical    Living Environment:   People in home: child(praveen), adult  Nela  Current living Arrangements: house      Able to return to prior arrangements: yes       Family/Social Support:  Care provided by: self  Provides care for: no one  Marital Status: Single  Children          Description of Support System: Supportive    Support Assessment: Adequate family and caregiver support    Current Resources:   Patient receiving home care services: No     Community Resources: None  Equipment currently used at home: none  Supplies currently used at home: None    Employment/Financial:  Employment Status: retired        Financial Concerns: none   Referral to Financial Worker: No       Does the patient's insurance plan have a 3 day qualifying hospital stay waiver?  No    Lifestyle & Psychosocial Needs:  Social Determinants of Health     Food Insecurity: Not on file   Depression: Not at risk (5/1/2024)    Received from [email or web address]    PHQ-2     PHQ-2 TOTAL SCORE: 0   Housing Stability: Not on file   Tobacco Use: Medium Risk (5/1/2024)    Received from [email or web address]    Patient History     Smoking Tobacco Use: Former     Smokeless Tobacco Use: Never     Passive  Exposure: Not on file   Financial Resource Strain: High Risk (1/1/2022)    [facility]    Financial Resource Strain     Difficulty of Paying Living Expenses: Not on file     Difficulty of Paying Living Expenses: Not on file   Alcohol Use: Not on file   Transportation Needs: Not on file   Physical Activity: Not on file   Interpersonal Safety: Not on file   Stress: Not on file   Social Connections: Unknown (1/1/2022)    Received from [facility], [facility]    Social Connections     Frequency of Communication with Friends and Family: Not on file   Health Literacy: Not on file       Functional Status:  Prior to admission patient needed assistance:   Dependent ADLs:: Independent  Dependent IADLs:: Independent  Assesssment of Functional Status: Not at  functional baseline    Mental Health Status:  Mental Health Status: Current Concern  Mental Health Management: Psychiatrist    Chemical Dependency Status:  Chemical Dependency Status: No Current Concerns             Values/Beliefs:  Spiritual, Cultural Beliefs, Uatsdin Practices, Values that affect care: no               Additional Information:  Writer was consulted for discharge planning. Patient has a SLUMS score of 11 so writer called patient's daughter to complete consult. Writer reviewed patient's chart.     Negro Aj is a 74 year old male admitted on 8/2/2024. He was brought to the emergency department by his daughter after she noted him to be confused and fatigued on the couch.  Upon arrival with significant rigors, fever to 103.5, and encephalopathy.     Patient has infectious disease consulted for possible meningitis. Writer was able to talk with patient's daughter Nela. Nela lives with the patient but works and is not able to be home with the patient. There are about 3-4 stairs to enter  the home and then all needs are met on the main floor for the patient. Patient is currently an assistive 1 and is typically independent at base line. Writer was able to confirm PCP, Address, Contacts and Active insurance with the patient's daughter.     Patient has a previous history of mental health concerns listed. Daughter mentioned most notably the patient OCD and has had previous mental health hospitalization before. Patient sees a psychiatrist and manages mental health with medication. Patient has not had an admission for mental health. Patient has OCD, anxiety, MDD. When searching [place] Courts there is no previous history of commitment.     Referrals were sent out to TCU in the area where patient lives. At this time patient is not agreeable to TCU but daughter is not able to care for the patient at home and patient is needing assistance. Psych is consulted and is still to see the patient. Care Management will follow for discharge planning.      Junior POWERS, TLMELIDAW  [facility]  Care Management

## 2024-08-06 LAB
ALBUMIN UR-MCNC: NEGATIVE MG/DL
ANION GAP SERPL CALCULATED.3IONS-SCNC: 8 MMOL/L (ref 7–15)
APPEARANCE UR: CLEAR
BILIRUB UR QL STRIP: NEGATIVE
BUN SERPL-MCNC: 15.6 MG/DL (ref 8–23)
CALCIUM SERPL-MCNC: 7.8 MG/DL (ref 8.8–10.4)
CHLORIDE SERPL-SCNC: 97 MMOL/L (ref 98–107)
COLOR UR AUTO: ABNORMAL
CREAT SERPL-MCNC: 0.97 MG/DL (ref 0.67–1.17)
EGFRCR SERPLBLD CKD-EPI 2021: 83 ML/MIN/1.73M2
ERYTHROCYTE [DISTWIDTH] IN BLOOD BY AUTOMATED COUNT: 14 % (ref 10–15)
GLUCOSE BLDC GLUCOMTR-MCNC: 154 MG/DL (ref 70–99)
GLUCOSE BLDC GLUCOMTR-MCNC: 166 MG/DL (ref 70–99)
GLUCOSE BLDC GLUCOMTR-MCNC: 172 MG/DL (ref 70–99)
GLUCOSE BLDC GLUCOMTR-MCNC: 173 MG/DL (ref 70–99)
GLUCOSE BLDC GLUCOMTR-MCNC: 173 MG/DL (ref 70–99)
GLUCOSE BLDC GLUCOMTR-MCNC: 202 MG/DL (ref 70–99)
GLUCOSE SERPL-MCNC: 176 MG/DL (ref 70–99)
GLUCOSE UR STRIP-MCNC: 30 MG/DL
HCO3 SERPL-SCNC: 30 MMOL/L (ref 22–29)
HCT VFR BLD AUTO: 34.9 % (ref 40–53)
HGB BLD-MCNC: 11.4 G/DL (ref 13.3–17.7)
HGB UR QL STRIP: NEGATIVE
KETONES UR STRIP-MCNC: NEGATIVE MG/DL
LEUKOCYTE ESTERASE UR QL STRIP: NEGATIVE
MAGNESIUM SERPL-MCNC: 1.7 MG/DL (ref 1.7–2.3)
MCH RBC QN AUTO: 30.2 PG (ref 26.5–33)
MCHC RBC AUTO-ENTMCNC: 32.7 G/DL (ref 31.5–36.5)
MCV RBC AUTO: 93 FL (ref 78–100)
NITRATE UR QL: NEGATIVE
PH UR STRIP: 7 [PH] (ref 5–7)
PHOSPHATE SERPL-MCNC: 2.3 MG/DL (ref 2.5–4.5)
PLATELET # BLD AUTO: 159 10E3/UL (ref 150–450)
POTASSIUM SERPL-SCNC: 3.7 MMOL/L (ref 3.4–5.3)
RBC # BLD AUTO: 3.77 10E6/UL (ref 4.4–5.9)
SODIUM SERPL-SCNC: 135 MMOL/L (ref 135–145)
SP GR UR STRIP: 1.01 (ref 1–1.03)
UROBILINOGEN UR STRIP-MCNC: NORMAL MG/DL
WBC # BLD AUTO: 9.4 10E3/UL (ref 4–11)

## 2024-08-06 PROCEDURE — 81003 URINALYSIS AUTO W/O SCOPE: CPT | Performed by: INTERNAL MEDICINE

## 2024-08-06 PROCEDURE — 99232 SBSQ HOSP IP/OBS MODERATE 35: CPT | Performed by: INTERNAL MEDICINE

## 2024-08-06 PROCEDURE — 250N000013 HC RX MED GY IP 250 OP 250 PS 637: Performed by: INTERNAL MEDICINE

## 2024-08-06 PROCEDURE — 80048 BASIC METABOLIC PNL TOTAL CA: CPT | Performed by: HOSPITALIST

## 2024-08-06 PROCEDURE — 36415 COLL VENOUS BLD VENIPUNCTURE: CPT | Performed by: HOSPITALIST

## 2024-08-06 PROCEDURE — 85027 COMPLETE CBC AUTOMATED: CPT | Performed by: HOSPITALIST

## 2024-08-06 PROCEDURE — 250N000013 HC RX MED GY IP 250 OP 250 PS 637: Performed by: HOSPITALIST

## 2024-08-06 PROCEDURE — 84100 ASSAY OF PHOSPHORUS: CPT | Performed by: HOSPITALIST

## 2024-08-06 PROCEDURE — 250N000013 HC RX MED GY IP 250 OP 250 PS 637

## 2024-08-06 PROCEDURE — 83735 ASSAY OF MAGNESIUM: CPT | Performed by: HOSPITALIST

## 2024-08-06 PROCEDURE — 120N000001 HC R&B MED SURG/OB

## 2024-08-06 RX ORDER — QUETIAPINE FUMARATE 25 MG/1
25 TABLET, FILM COATED ORAL AT BEDTIME
Status: DISCONTINUED | OUTPATIENT
Start: 2024-08-06 | End: 2024-08-09

## 2024-08-06 RX ADMIN — INSULIN ASPART 1 UNITS: 100 INJECTION, SOLUTION INTRAVENOUS; SUBCUTANEOUS at 13:09

## 2024-08-06 RX ADMIN — ATORVASTATIN CALCIUM 20 MG: 20 TABLET, FILM COATED ORAL at 08:21

## 2024-08-06 RX ADMIN — METOPROLOL TARTRATE 25 MG: 25 TABLET, FILM COATED ORAL at 08:21

## 2024-08-06 RX ADMIN — POTASSIUM & SODIUM PHOSPHATES POWDER PACK 280-160-250 MG 1 PACKET: 280-160-250 PACK at 13:12

## 2024-08-06 RX ADMIN — QUETIAPINE FUMARATE 25 MG: 25 TABLET ORAL at 21:29

## 2024-08-06 RX ADMIN — INSULIN ASPART 1 UNITS: 100 INJECTION, SOLUTION INTRAVENOUS; SUBCUTANEOUS at 08:24

## 2024-08-06 RX ADMIN — POTASSIUM & SODIUM PHOSPHATES POWDER PACK 280-160-250 MG 1 PACKET: 280-160-250 PACK at 15:58

## 2024-08-06 RX ADMIN — METOPROLOL TARTRATE 25 MG: 25 TABLET, FILM COATED ORAL at 21:28

## 2024-08-06 RX ADMIN — POTASSIUM & SODIUM PHOSPHATES POWDER PACK 280-160-250 MG 1 PACKET: 280-160-250 PACK at 08:20

## 2024-08-06 RX ADMIN — PANTOPRAZOLE SODIUM 40 MG: 40 TABLET, DELAYED RELEASE ORAL at 08:21

## 2024-08-06 RX ADMIN — INSULIN ASPART 1 UNITS: 100 INJECTION, SOLUTION INTRAVENOUS; SUBCUTANEOUS at 17:38

## 2024-08-06 ASSESSMENT — ACTIVITIES OF DAILY LIVING (ADL)
ADLS_ACUITY_SCORE: 45
ADLS_ACUITY_SCORE: 45
ADLS_ACUITY_SCORE: 48
ADLS_ACUITY_SCORE: 45
ADLS_ACUITY_SCORE: 49
ADLS_ACUITY_SCORE: 45
ADLS_ACUITY_SCORE: 48
ADLS_ACUITY_SCORE: 48
ADLS_ACUITY_SCORE: 45
ADLS_ACUITY_SCORE: 43
ADLS_ACUITY_SCORE: 45
ADLS_ACUITY_SCORE: 43
ADLS_ACUITY_SCORE: 43
ADLS_ACUITY_SCORE: 45
ADLS_ACUITY_SCORE: 43
ADLS_ACUITY_SCORE: 45
ADLS_ACUITY_SCORE: 49
ADLS_ACUITY_SCORE: 45
ADLS_ACUITY_SCORE: 43
ADLS_ACUITY_SCORE: 43

## 2024-08-06 NOTE — PLAN OF CARE
Goal Outcome Evaluation:       Orientations: Orientated to person and intermittent location, disorientated to situation and time  Vitals/Pain: VSS   Tele: NS, Tele discontinued   Lines/Drains: RAC PIV 20g SL  Skin/Wounds: Scattered Bruising   GI/: Voiding frequency, BM today   Labs: Abnormal/Trends, Electrolyte Replacement- On K, Mg, P replacement protocol, replaced P today  Ambulation/Assist: Stand by assist, sitter at bedside for increased getting out of bed   Sleep Quality: Intermittent sleep throughout the day  Plan: Reorientation as needed, Increase sleep, new order for seroquel at , possible discharge in one to two days following decrease in confusion

## 2024-08-06 NOTE — PLAN OF CARE
Goal Outcome Evaluation:                    [id number]  A&O x3, disoriented to time, sometimes to situation. Confused. VSS on RA, HR elevated at times.  Lungs sounds clear/diminished. Bowel Sounds - normoactive, no BM during shift.  Urine Output - adequate.  Ambulation - SBA Diet - Regular w/carb count. Pain controlled by - denied pain. Daughter would like a call in the morning from hospitalist re pt's confusion.

## 2024-08-06 NOTE — PLAN OF CARE
Orientation: A&Ox3-4, intermittent confusion.   Vitals/Pain: VSS on RA. Denies pain.  Tele: SR w/PAC's & PVC's.   Diet: Regular, carb count. BG checks: 177, 232, then 194.   Lungs: Clear LS.   Lines/Drains: PIV SL.   Skin/Wounds: BLE rash, scattered bruises & scabs.   GI/: AUO. +BM.   Labs: K, mg, & phos lab rechecks in AM.   Ambulation/Assist: Up A1+gb to BR, ambulated in hallway, PT/OT following.   Plan: Viral meningitis, MD stated pt does not need to be on precautions. Psych following. TCU at discharge, CM/SW following.

## 2024-08-06 NOTE — PLAN OF CARE
"VSS on RA. Tele SR. Alert, confused. Oriented to self. Frequently reoriented. Follows simple commands. Speech clear, rambles at times. Spoke about \"how will I get the money.\" Neuros intact except did not follow command for finger-to-nose or heel-to-shin. Urinary frequency, patient up every 20-30 minutes to void overnight, MD notified and UA sent this AM. Impulsive when up to bathroom, does not use call light. Patient moved to room closer to nursing station for safety. Denies pain. Up SBA in room.   "

## 2024-08-06 NOTE — PROGRESS NOTES
Hospitalist service cross-cover note:     Paged regarding frequent urination overnight. Not on IVF. PVR low. Ordered UA.     Wong Alvarado MD   Hospitalist

## 2024-08-06 NOTE — PROGRESS NOTES
[facility]  Hospitalist Progress Note  Bienvenido Loya MD  08/06/2024    Assessment & Plan   This is a 74-year-old male with medical history which includes depression, OCD, diabetes mellitus on metformin who was brought to the hospital on 8/3/2024 by family with concerns of lethargy and confusion and admitted to the hospital with sepsis and encephalopathy           Likely viral meningitis  Severe sepsis-resolved  -On admission presented with fever, confusion  -Initial lab work on admit showed sodium of 129, chloride of 91, creatinine of 1.45 with GFR of 51, total bilirubin of 1.3, CK3 2 3, glucose 163 and ALT AST and alkaline phosphatase normal, lactic acid of 1.4  -UA on admit was negative  -COVID-19, influenza and RSV negative  -CT head 8/2-normal  -Chest x-ray 8/3-no evidence of any pneumonia  -Blood cultures 8/2-negative   --Tick panel negative  - LP on admit-glucose of 79, protein elevated at 56.3, total nucleated cells 195, RBC 25  --Meningitis panel negative  -CSF aerobic bacterial culture-negative  -West Nile PCR pending  -On admit patient was started on broad-spectrum antibiotics and ID was consulted  -Antibiotic changed during the course of hospital stay along with steroids  - on exam does not seem to have any neck rigidity or stiffness and his headaches have significantly improved  -Patient seen by infectious disease team and antibiotics have been stopped and no further infectious disease follow-up needed        Hyponatremia-improved and hypochloremia-resolved  -Sodium on admit was 129 with chloride of 91  -Unclear if patient was given IV fluids but labs done on 8/3 AM showed serum osmolality of 290, urine sodium of 37 and urine osmolality of 370 with TSH of 0.53  -Patient was started on IV fluids and his sodium is improved to 134 with chloride of 100 and likely due to dehydration    Delirium  Patient with altering periods of confusion and clarity and sleeping.  He had very  little sleep overnight.  - not on any deleriogenic medication but will discontinue benzo and narcotics  - seroquel 6.25. mg BID PRN  - seroquel 25 mg QHA     Elevated creatinine on CKD stage III  -Baseline creatinine is normal at 1.04-1.28  -Creatinine on admit was 1.45 and likely due to dehydration due to underlying sepsis and was given IV fluids and creatinine has normalized to baseline to 1.     Chronic anemia  Acute thrombocytopenia likely due to underlying sepsis  -Baseline hemoglobin in EMR-10.5-11.8  -Hemoglobin on admit was 11.8 with platelet count of 138  -Hemoglobin-11.8-10.7  -Platelet [phone]  -Will monitor closely     Diabetes mellitus  -PTA regimen includes metformin  -Continue with sliding scale insulin     History of OCD and depression  History of ECT treatment in 2017  -PTA regimen: Seroquel 50 twice daily, Ativan 0.5 mg 3 times daily as needed  -Psychiatry team consulted     Hypertension  Hyperlipidemia  -PTA regimen as per pharmacology: Amlodipine 5 mg twice daily, aspirin 81 mg, atorvastatin 20 mg daily, lisinopril 20 mg daily, metoprolol 25 twice daily  -Blood pressure is stable and will introduce atorvastatin and metoprolol and further medications can be reintroduced on 8/6 AM pending blood pressure levels     GERD  -PTA regimen includes 20 mg omeprazole     Physical deconditioning  -Seen by therapy teams and they have recommended TCU     Family communication   - I did speak with adrianne [phone] and she was updated        Diet: Combination Diet Regular Diet Adult    DVT Prophylaxis: Pneumatic Compression Devices  Spencer Catheter: Not present  Lines: None     Cardiac Monitoring: ACTIVE order. Indication: Tachyarrhythmias, acute (48 hours)  Code Status: Full Code          Clinically Significant Risk Factors          # Hypocalcemia: Lowest Ca = 7.7 mg/dL in last 2 days, will monitor and replace as appropriate       # Thrombocytopenia: Lowest platelets = 123 in last 2 days, will monitor  "for bleeding   # Hypertension: Noted on problem list             # DMII: A1C = 7.3 % (Ref range: <5.7 %) within past 6 months, PRESENT ON ADMISSION  # Overweight: Estimated body mass index is 25.73 kg/m  as calculated from the following:    Height as of this encounter: 1.74 m (5' 8.5\").    Weight as of this encounter: 77.9 kg (171 lb 11.8 oz)., PRESENT ON ADMISSION         Disposition Plan  -- anticipate to TCU     Medically Ready for Discharge: on 8/7 or 8/8    Disposition:     Interval History   -- chart reviewed  -- discussed with RN, daughter via telephone  -- little to no sleep  -- he is intermittently confused and falls asleep    -Data reviewed today: I reviewed all new labs and imaging over the last 24 hours. I personally reviewed no images or EKG's today.    Physical Exam    , Blood pressure (!) 154/93, pulse 61, temperature 98.5  F (36.9  C), temperature source Oral, resp. rate 18, height 1.74 m (5' 8.5\"), weight 77.9 kg (171 lb 11.8 oz), SpO2 98%.  Vitals:    08/03/24 0803   Weight: 77.9 kg (171 lb 11.8 oz)     Vital Signs with Ranges  Temp:  [98.2  F (36.8  C)-98.8  F (37.1  C)] 98.5  F (36.9  C)  Pulse:  [61-93] 61  Resp:  [18] 18  BP: (138-154)/(83-96) 154/93  SpO2:  [97 %-98 %] 98 %  I/O's Last 24 hours  I/O last 3 completed shifts:  In: 500 [P.O.:500]  Out: -     Constitutional:  Somnolent, slow speech, oriented to self and place  Respiratory: Clear to auscultation bilaterally, no crackles or wheezing  Cardiovascular: Regular rate and rhythm, normal S1 and S2, and no murmur noted  GI: Normal bowel sounds, soft, non-distended, non-tender  Skin/Integumen: No rashes, no cyanosis, no edema  Other:      Medications   All medications were reviewed.  Current Facility-Administered Medications   Medication Dose Route Frequency Provider Last Rate Last Admin     Current Facility-Administered Medications   Medication Dose Route Frequency Provider Last Rate Last Admin    atorvastatin (LIPITOR) tablet 20 mg  20 mg " Oral Daily Ana Luisa Campos MD   20 mg at 08/06/24 0821    insulin aspart (NovoLOG) injection (RAPID ACTING)   Subcutaneous TID w/meals Oliveira, Lucio Gonzalez MD   4 Units at 08/06/24 0954    insulin aspart (NovoLOG) injection (RAPID ACTING)  1-7 Units Subcutaneous TID AC Oliveira, Lucio Gonzalez MD   1 Units at 08/06/24 1309    insulin aspart (NovoLOG) injection (RAPID ACTING)  1-5 Units Subcutaneous At Bedtime Oliveira, Lucio Goznalez MD   2 Units at 08/04/24 2315    metoprolol tartrate (LOPRESSOR) tablet 25 mg  25 mg Oral BID Ana Luisa Campos MD   25 mg at 08/06/24 0821    pantoprazole (PROTONIX) EC tablet 40 mg  40 mg Oral QAM AC Jhoana Holland, [facility]   40 mg at 08/06/24 0821    potassium & sodium phosphates (NEUTRA-PHOS) Packet 1 packet  1 packet Oral or Feeding Tube Q4H Oliveira, Lucio Gonzalez MD   1 packet at 08/06/24 1312        Data   Recent Labs   Lab 08/06/24  1229 08/06/24  1104 08/06/24  0731 08/06/24  0442 08/05/24  1322 08/05/24  0712 08/04/24  0907 08/04/24  0547 08/03/24  0830 08/03/24  0603 08/03/24  0344 08/02/24 2020   WBC  --   --   --  9.4  --  11.9*  --  13.2*  --  9.1  --   --    HGB  --   --   --  11.4*  --  10.7*  --  11.0*  --  11.5*  --   --    MCV  --   --   --  93  --  92  --  91  --  89  --   --    PLT  --   --   --  159  --  136*  --  123*  --  120*  --   --    INR  --   --   --   --   --   --   --   --   --  1.15  --  1.12   NA  --   --   --  135  --  134*  --  137  --  132*  --   --    POTASSIUM  --   --   --  3.7  --  3.6  --  3.9  --  4.1  --   --    CHLORIDE  --   --   --  97*  --  100  --  104  --  97*  --   --    CO2  --   --   --  30*  --  24  --  24  --  22  --   --    BUN  --   --   --  15.6  --  23.8*  --  34.4*  --  23.9*  --   --    CR  --   --   --  0.97  --  1.12  --  1.26*  --  1.29*  --   --    ANIONGAP  --   --   --  8  --  10  --  9  --  13  --   --    MELBA  --   --   --  7.8*  --  7.7*  --  8.1*  --  8.0*  --   --    * 202* 154* 176*   < > 177*   < > 278*   < > 226*   < >  --     ALBUMIN  --   --   --   --   --   --   --  3.6  --  4.0  --   --    PROTTOTAL  --   --   --   --   --   --   --  6.3*  --  7.0  --   --    BILITOTAL  --   --   --   --   --   --   --  0.4  --  1.0  --   --    ALKPHOS  --   --   --   --   --   --   --  77  --  85  --   --    ALT  --   --   --   --   --   --   --  56  --  50  --   --    AST  --   --   --   --   --   --   --  54*  --  48*  --   --     < > = values in this interval not displayed.       No results found for this or any previous visit (from the past 24 hour(s)).    Bienvenido Loya MD  Text Page  (7am to 6pm)

## 2024-08-06 NOTE — PROVIDER NOTIFICATION
Dr Alvarado notified regarding patient's frequent urination overnight. Patient up to void every 20-30 minutes all night. PVR 109ml. Order for UA.    Patient returns d/t recurrent L hip dislocation. Cup cage revision was on 11/6/19 with subsequent dislocation s/p open reduction on 12/4/19. Patient says she did not fall or twist her leg but felt it spontaneously dislocate, with subsequent L hip pain. Denies any fevers/wound drainage.    1/2 cultures from 12/4/19 were positive for MSSA.    Plan for possible open reduction vs. revision arthroplasty. I have consented the patient for this procedure and have discussed potential complications including but not limited to infection, bleeding, nerve damage, leg length discrepancy, fracture, and potential need for further procedures. The patient understood all of this and would like to proceed. Consent was signed. All questions answered. pt states epidural not working and  wants it out after long discussion with patient will try ivpca diluadid pt states finally feeling better No objection to DC from medical perspective

## 2024-08-06 NOTE — PROGRESS NOTES
[facility]  Infectious Disease Progress Note          Assessment and Plan:   Date of Admission:  8/2/2024  Date of Consult (When I saw the patient): 08/03/24        Assessment & Plan  Negro Aj is a 74 year old male who was admitted on 8/2/2024.      Impression: 1 74-year-old male with acute fever, confusion unclear etiology but lumbar puncture abnormal consistent with meningitis, mostly lymphocytes likely aseptic meningitis, meningitis profile negative, large differential diagnosis of infectious and noninfectious causes  2 major fever, septic like condition but procalcitonin low, blood cultures negative no obvious focal infection except the CNS process  3 diabetes mellitus  4 ATN  5 major depression and psychiatric disease     REC 1 very unlikely bacterial  Antibiotics stopped  2 large differential diagnosis ,statistically most likely viral, improved so have been holding on a bigger workup  As of yesterday seemed significantly better, mentation improved, headache lasts no other focal symptoms and no positive microbiology    Worsening overnight and today and evident on exam, no other focal neurologic abnormalities, no ongoing fever and no positive microbiology, as noted above large differential diagnosis, ? neurology to see question further workup for noninfectious causes of this.  Given the initial fever and the statistics of this CSF still likely viral with possibly confusion for other reasons              Interval History:     Worsened mentation today both subjectively and objectively, no new focal symptoms no fever no cp, sob, n/v/d, or abd pain.  Temp down and headache better, no other focal symptoms micro all negative              Medications:     Current Facility-Administered Medications   Medication Dose Route Frequency Provider Last Rate Last Admin    atorvastatin (LIPITOR) tablet 20 mg  20 mg Oral Daily Ana Luisa aCmpos MD   20 mg at 08/06/24 0821    insulin aspart  "(NovoLOG) injection (RAPID ACTING)   Subcutaneous TID w/meals Oliveira, Lucio Gonzalez MD   4 Units at 08/05/24 1444    insulin aspart (NovoLOG) injection (RAPID ACTING)  1-7 Units Subcutaneous TID AC Oliveira, Lucio Gonzalez MD   1 Units at 08/06/24 0824    insulin aspart (NovoLOG) injection (RAPID ACTING)  1-5 Units Subcutaneous At Bedtime Oliveira, Lucio Gonzalez MD   2 Units at 08/04/24 2315    metoprolol tartrate (LOPRESSOR) tablet 25 mg  25 mg Oral BID Ana Luisa Campos MD   25 mg at 08/06/24 0821    pantoprazole (PROTONIX) EC tablet 40 mg  40 mg Oral QAM AC Jhoana Holland [facility]   40 mg at 08/06/24 0821    potassium & sodium phosphates (NEUTRA-PHOS) Packet 1 packet  1 packet Oral or Feeding Tube Q4H Oliveira, Lucio Gonzalez MD   1 packet at 08/06/24 0820                  Physical Exam:   Blood pressure (!) 144/83, pulse 93, temperature 98.4  F (36.9  C), temperature source Oral, resp. rate 18, height 1.74 m (5' 8.5\"), weight 77.9 kg (171 lb 11.8 oz), SpO2 97%.  Wt Readings from Last 2 Encounters:   08/03/24 77.9 kg (171 lb 11.8 oz)   11/14/17 81.5 kg (179 lb 11.2 oz)     Vital Signs with Ranges  Temp:  [98.2  F (36.8  C)-99.3  F (37.4  C)] 98.4  F (36.9  C)  Pulse:  [93-97] 93  Resp:  [18] 18  BP: (121-149)/(80-96) 144/83  SpO2:  [93 %-99 %] 97 %    Constitutional: Awake, alert, cooperative, no apparent distress     Lungs: Clear to auscultation bilaterally, no crackles or wheezing   Cardiovascular: Regular rate and rhythm, normal S1 and S2, and no murmur noted   Abdomen: Normal bowel sounds, soft, non-distended, non-tender   Skin: No rashes, no cyanosis, no edema   Other:           Data:   All microbiology laboratory data reviewed.  Recent Labs   Lab Test 08/06/24  0442 08/05/24  0712 08/04/24  0547   WBC 9.4 11.9* 13.2*   HGB 11.4* 10.7* 11.0*   HCT 34.9* 32.9* 33.2*   MCV 93 92 91    136* 123*     Recent Labs   Lab Test 08/06/24  0442 08/05/24  0712 08/04/24  0547   CR 0.97 1.12 1.26*     No lab results found.  No lab " "results found.    Invalid input(s): \"UC\"     "

## 2024-08-07 ENCOUNTER — APPOINTMENT (OUTPATIENT)
Dept: MRI IMAGING | Facility: CLINIC | Age: 74
DRG: 871 | End: 2024-08-07
Attending: STUDENT IN AN ORGANIZED HEALTH CARE EDUCATION/TRAINING PROGRAM
Payer: MEDICARE

## 2024-08-07 ENCOUNTER — APPOINTMENT (OUTPATIENT)
Dept: OCCUPATIONAL THERAPY | Facility: CLINIC | Age: 74
DRG: 871 | End: 2024-08-07
Attending: STUDENT IN AN ORGANIZED HEALTH CARE EDUCATION/TRAINING PROGRAM
Payer: MEDICARE

## 2024-08-07 LAB
BACTERIA BLD CULT: NO GROWTH
GLUCOSE BLDC GLUCOMTR-MCNC: 123 MG/DL (ref 70–99)
GLUCOSE BLDC GLUCOMTR-MCNC: 141 MG/DL (ref 70–99)
GLUCOSE BLDC GLUCOMTR-MCNC: 143 MG/DL (ref 70–99)
GLUCOSE BLDC GLUCOMTR-MCNC: 156 MG/DL (ref 70–99)
GLUCOSE BLDC GLUCOMTR-MCNC: 165 MG/DL (ref 70–99)
MAGNESIUM SERPL-MCNC: 1.8 MG/DL (ref 1.7–2.3)
PHOSPHATE SERPL-MCNC: 3 MG/DL (ref 2.5–4.5)
POTASSIUM SERPL-SCNC: 3.8 MMOL/L (ref 3.4–5.3)
WNV RNA SPEC QL NAA+PROBE: NOT DETECTED

## 2024-08-07 PROCEDURE — 120N000001 HC R&B MED SURG/OB

## 2024-08-07 PROCEDURE — 84132 ASSAY OF SERUM POTASSIUM: CPT | Performed by: INTERNAL MEDICINE

## 2024-08-07 PROCEDURE — 99232 SBSQ HOSP IP/OBS MODERATE 35: CPT | Performed by: INTERNAL MEDICINE

## 2024-08-07 PROCEDURE — 36415 COLL VENOUS BLD VENIPUNCTURE: CPT | Performed by: INTERNAL MEDICINE

## 2024-08-07 PROCEDURE — 250N000013 HC RX MED GY IP 250 OP 250 PS 637

## 2024-08-07 PROCEDURE — 83735 ASSAY OF MAGNESIUM: CPT | Performed by: INTERNAL MEDICINE

## 2024-08-07 PROCEDURE — A9585 GADOBUTROL INJECTION: HCPCS | Performed by: HOSPITALIST

## 2024-08-07 PROCEDURE — 255N000002 HC RX 255 OP 636: Performed by: HOSPITALIST

## 2024-08-07 PROCEDURE — 97535 SELF CARE MNGMENT TRAINING: CPT | Mod: GO

## 2024-08-07 PROCEDURE — 250N000013 HC RX MED GY IP 250 OP 250 PS 637: Performed by: HOSPITALIST

## 2024-08-07 PROCEDURE — 250N000011 HC RX IP 250 OP 636: Performed by: INTERNAL MEDICINE

## 2024-08-07 PROCEDURE — 70551 MRI BRAIN STEM W/O DYE: CPT | Mod: MG

## 2024-08-07 PROCEDURE — 84100 ASSAY OF PHOSPHORUS: CPT | Performed by: INTERNAL MEDICINE

## 2024-08-07 RX ORDER — LORAZEPAM 2 MG/ML
.5-1 INJECTION INTRAMUSCULAR ONCE
Status: COMPLETED | OUTPATIENT
Start: 2024-08-07 | End: 2024-08-07

## 2024-08-07 RX ORDER — GADOBUTROL 604.72 MG/ML
7 INJECTION INTRAVENOUS ONCE
Status: COMPLETED | OUTPATIENT
Start: 2024-08-07 | End: 2024-08-07

## 2024-08-07 RX ADMIN — ATORVASTATIN CALCIUM 20 MG: 20 TABLET, FILM COATED ORAL at 09:39

## 2024-08-07 RX ADMIN — INSULIN ASPART 1 UNITS: 100 INJECTION, SOLUTION INTRAVENOUS; SUBCUTANEOUS at 13:52

## 2024-08-07 RX ADMIN — PANTOPRAZOLE SODIUM 40 MG: 40 TABLET, DELAYED RELEASE ORAL at 09:39

## 2024-08-07 RX ADMIN — METOPROLOL TARTRATE 25 MG: 25 TABLET, FILM COATED ORAL at 09:39

## 2024-08-07 RX ADMIN — INSULIN ASPART 1 UNITS: 100 INJECTION, SOLUTION INTRAVENOUS; SUBCUTANEOUS at 18:29

## 2024-08-07 RX ADMIN — GADOBUTROL 7 ML: 604.72 INJECTION INTRAVENOUS at 20:14

## 2024-08-07 RX ADMIN — INSULIN ASPART 1 UNITS: 100 INJECTION, SOLUTION INTRAVENOUS; SUBCUTANEOUS at 09:43

## 2024-08-07 RX ADMIN — LORAZEPAM 0.5 MG: 2 INJECTION INTRAMUSCULAR; INTRAVENOUS at 19:33

## 2024-08-07 RX ADMIN — METOPROLOL TARTRATE 25 MG: 25 TABLET, FILM COATED ORAL at 23:27

## 2024-08-07 ASSESSMENT — ACTIVITIES OF DAILY LIVING (ADL)
ADLS_ACUITY_SCORE: 48
ADLS_ACUITY_SCORE: 48
ADLS_ACUITY_SCORE: 49
ADLS_ACUITY_SCORE: 49
ADLS_ACUITY_SCORE: 48
ADLS_ACUITY_SCORE: 48
ADLS_ACUITY_SCORE: 49
ADLS_ACUITY_SCORE: 49
ADLS_ACUITY_SCORE: 48
ADLS_ACUITY_SCORE: 49
ADLS_ACUITY_SCORE: 50
ADLS_ACUITY_SCORE: 49
ADLS_ACUITY_SCORE: 50
ADLS_ACUITY_SCORE: 49
ADLS_ACUITY_SCORE: 49
ADLS_ACUITY_SCORE: 50
ADLS_ACUITY_SCORE: 49

## 2024-08-07 NOTE — PROGRESS NOTES
[facility]  Infectious Disease Progress Note          Assessment and Plan:   Date of Admission:  8/2/2024  Date of Consult (When I saw the patient): 08/03/24        Assessment & Plan  Negro Aj is a 74 year old male who was admitted on 8/2/2024.      Impression: 1 74-year-old male with acute fever, confusion unclear etiology but lumbar puncture abnormal consistent with meningitis, mostly lymphocytes likely aseptic meningitis, meningitis profile negative, large differential diagnosis of infectious and noninfectious causes  2 major fever, septic like condition but procalcitonin low, blood cultures negative no obvious focal infection except the CNS process  3 diabetes mellitus  4 ATN  5 major depression and psychiatric disease     REC 1 very unlikely bacterial  Antibiotics stopped  2 large differential diagnosis ,statistically most likely viral, improved so have been holding on a bigger workup  As of yesterday seemed significantly better, mentation improved, headache lasts no other focal symptoms and no positive microbiology    Worsening confusion intermittently, no other focal neurologic abnormalities, no ongoing fever and no positive microbiology, as noted above large differential diagnosis, ? neurology to see question further workup for noninfectious causes of this.  Given the initial fever and the statistically  illness and  CSF still likely viral ,with possibly confusion for other reasons but ongoing clinical symptoms raise possibility of an alternative diagnosis of aseptic meningitis which has a very large differential diagnosis of both infectious and noninfectious causes              Interval History:     Worsened mentation today both subjectively and objectively, no new focal symptoms no fever no cp, sob, n/v/d, or abd pain.  Temp down and headache better, no other focal symptoms micro all negative              Medications:     Current Facility-Administered Medications  "  Medication Dose Route Frequency Provider Last Rate Last Admin    atorvastatin (LIPITOR) tablet 20 mg  20 mg Oral Daily Ana Luisa Campos MD   20 mg at 08/07/24 0939    insulin aspart (NovoLOG) injection (RAPID ACTING)   Subcutaneous TID w/meals Oliveira, Lucio Gonzalez MD   6 Units at 08/06/24 1852    insulin aspart (NovoLOG) injection (RAPID ACTING)  1-7 Units Subcutaneous TID AC Oliveira, Lucio Gonzalez MD   1 Units at 08/07/24 0943    insulin aspart (NovoLOG) injection (RAPID ACTING)  1-5 Units Subcutaneous At Bedtime Oliveira, Lucio Gonzalez MD   2 Units at 08/04/24 2315    metoprolol tartrate (LOPRESSOR) tablet 25 mg  25 mg Oral BID Ana Luisa Campos MD   25 mg at 08/07/24 0939    pantoprazole (PROTONIX) EC tablet 40 mg  40 mg Oral QAM AC Jhoana Holland, [facility]   40 mg at 08/07/24 0939    QUEtiapine (SEROquel) tablet 25 mg  25 mg Oral At Bedtime Bienvenido Loya MD   25 mg at 08/06/24 2129                  Physical Exam:   Blood pressure (!) 138/93, pulse 78, temperature 97.3  F (36.3  C), temperature source Oral, resp. rate 18, height 1.74 m (5' 8.5\"), weight 72.7 kg (160 lb 4.8 oz), SpO2 98%.  Wt Readings from Last 2 Encounters:   08/07/24 72.7 kg (160 lb 4.8 oz)   11/14/17 81.5 kg (179 lb 11.2 oz)     Vital Signs with Ranges  Temp:  [97.3  F (36.3  C)-98.5  F (36.9  C)] 97.3  F (36.3  C)  Pulse:  [61-78] 78  Resp:  [18-20] 18  BP: (137-161)/(89-94) 138/93  SpO2:  [95 %-98 %] 98 %    Constitutional: Awake, alert, cooperative, no apparent distress     Lungs: Clear to auscultation bilaterally, no crackles or wheezing   Cardiovascular: Regular rate and rhythm, normal S1 and S2, and no murmur noted   Abdomen: Normal bowel sounds, soft, non-distended, non-tender   Skin: No rashes, no cyanosis, no edema   Other:           Data:   All microbiology laboratory data reviewed.  Recent Labs   Lab Test 08/06/24  0442 08/05/24  0712 08/04/24  0547   WBC 9.4 11.9* 13.2*   HGB 11.4* 10.7* 11.0*   HCT 34.9* 32.9* 33.2*   MCV 93 92 91   PLT " "159 136* 123*     Recent Labs   Lab Test 08/06/24  0442 08/05/24  0712 08/04/24  0547   CR 0.97 1.12 1.26*     No lab results found.  No lab results found.    Invalid input(s): \"UC\"     "

## 2024-08-07 NOTE — PLAN OF CARE
Goal Outcome Evaluation:    Orientations: alert & orientated to self. Int confusion. Calm & cooperative most of the night.   Vitals/Pain: Vitals stable on RA. Denies pain.  Tele: n/a  Lungs: Clear  Lines/Drains: R PIV, SL  Skin/Wounds: WDL ex scattered Bruising   GI/: active BS, had BM, adequately voiding to the bathroom. Urinary frequency.   Labs: Abnormal/Trends, Electrolyte Replacement- On K/Mg/ Phos protocols, recheck 8/8 am.  Ambulation/Assist: Up with SBA.   Sleep Quality: slept/rested good overnight.  Plan: Ensure pt.safety, reorientation, continue plan of care.

## 2024-08-07 NOTE — CONSULTS
[facility]    Neurology Consultation    Negro Aj MRN# [id number]   YOB: 1950 Age: 74 year old    Code Status:Full Code   Date of Admission: 8/2/2024  Date of Consult:08/07/2024                                                                                       Assessment and Plan:                                         #.  Encephalopathy  Likely still from aseptic meningitis was found on LP.  ID signed off given no signs of bacterial infection.  Headache is improving as well as fevers.  No finding settings of any septic source outside of CSF. I suspect this is most likely contributing in addition to delirium.  Mild atrophy but appropriate for age, this can also delay recovery from infectious and metabolic abnormalities.  On exam patient did have increased muscle tone as well as tremors appearing at rest and with some positional testing.  I suspect this is in relation to his Seroquel dose of which patient did receive a low dose but seems to be susceptible for it.  This should be worked up in the outpatient setting, but those who have a susceptibility for parkinsonism side effects of antipsychotics as well as susceptibility for delirium may be the first presentation of Lewy body dementia.  Again, this should be an outpatient follow-up with neurology and neuropsychiatry for assessment.  His prior neuropsych testing in 2013 did reveal anomia that was mild as well as some difficulty with complex attentional processing.  I suspect these deficits may have worsened in that time frame and may be heightened in the setting of infection.   -Will recommend holding Seroquel as patient seems to be susceptible to this, should he improve recommend that he follow-up in the outpatient setting for an underlying dementia that may be worsening.  -Should there be no improvement with holding medication, could consider an EEG tomorrow  -Will get MRI brain w/wo contrast tonight.     -Will  "follow-up with the patient tomorrow    ----------------------------------------------------------------------------------  ----------------------------------------------------------------------------------  Reason for consult: as above       Chief Complaint:   Confusion           History of Present Illness:   This patient is a 74 year old male who presents with confusion.  Patient has a history of depression, OCD, diabetes.    Patient has been found to have likely viral meningitis.  He presented with fever and confusion with lab work showing sodium of 129, chloride 91, creatinine 1.45, , and glucose 163.  UA, COVID, influenza, RSV, blood cultures, x-ray, tick panel all negative.  Meningitis panel negative from the CSF but CSF did show mildly elevated protein of 56.3 with nucleated white blood cells of 193, 62% lymphocytes.  He was started on broad-spectrum antibiotics when initially presented but this has since been discontinued.  He was given steroids initially as well and headaches have improved.  He has not had very much sleep overnight.  Benzodiazepines and narcotics were discontinued but he has Seroquel 25 at night and 6.25 mg twice a day as needed.    Patient was seen by neuropsychiatry in 2013 and revealed \"mild anomia and subtle difficulty with complex attentional  processing. Learning and memory are subtly variable, but overall fall within normal limits. The findings do not reflect dementia at this time.\"     At bedside patient states he is feeling better with no headache.  Denies any light or sound sensitivity.  He reports he is watching [place] and that he was watching Olympics prior to coming in.  He does not know why he was brought to the hospital or at least is unable to state it to me.  Denies any vision changes, weakness, or numbness anywhere.  Denies any history of hallucinations. Daughter reports no concerns for cognitive concerns but does have OCD.     MEDICAL DOCUMENTATION REVIEWED: " progress notes 8/6 and ID 8/6            Past Medical History:     Past Medical History:   Diagnosis Date    Anxiety     Depression with anxiety     High cholesterol     Hypertension     newly treating in 2016    Knee pain, right     OCD (obsessive compulsive disorder)          Past Surgical History:     Past Surgical History:   Procedure Laterality Date    ARTHROPLASTY KNEE Right 2/9/2016    Procedure: ARTHROPLASTY KNEE;  Surgeon: Tiera Conteh MD;  Location: [facility] OR    ARTHROSCOPY KNEE Right           Social History:     Social History     Socioeconomic History    Marital status: Single   Tobacco Use    Smoking status: Never   Substance and Sexual Activity    Alcohol use: Yes     Comment: occasional    Drug use: No    Sexual activity: Never     Partners: Female   Other Topics Concern    Parent/sibling w/ CABG, MI or angioplasty before 65F 55M? No     Social Determinants of Health      Received from [employer] [employer] [facility] [employer]    Financial Resource Strain    Received from [employer] [employer] [facility] [employer]    Social Connections           Family History:     Family History   Problem Relation Age of Onset    Depression Mother     Anxiety Disorder Mother     Bipolar Disorder Mother     Bipolar Disorder Sister     Depression Sister     Depression Sister             Home Medications:     Prior to Admission Medications   Prescriptions Last Dose Informant Patient Reported? Taking?   LORazepam (ATIVAN) 0.5 MG tablet Unknown  No Yes   Sig: Take 1 tablet (0.5 mg) by mouth 3 times daily as needed for anxiety   Omeprazole (PRILOSEC PO) Unknown Pharmacy Yes Yes   Sig: Take 20 mg by mouth every morning    QUEtiapine (SEROQUEL) 50 MG tablet Unknown  No Yes   Sig: Take 1 tablet (50 mg) by mouth 2 times daily   amLODIPine (NORVASC) 5 MG tablet Unknown  Yes Yes   Sig: Take 5 mg by mouth 2 times daily   aspirin EC  81 MG EC tablet Unknown  No Yes   Sig: Take 1 tablet (81 mg) by mouth daily   atorvastatin (LIPITOR) 20 MG tablet Unknown  Yes Yes   Sig: Take 20 mg by mouth daily   cephALEXin (KEFLEX) 500 MG capsule Unknown  Yes Yes   Sig: Take 500 mg by mouth Take 2 capsules by mouth 1 hour before dental appointment and 2 after, total daily dose of 4 capsules.   lisinopril (ZESTRIL) 20 MG tablet Unknown  Yes Yes   Sig: Take 20 mg by mouth daily   metFORMIN (GLUCOPHAGE) 500 MG tablet Unknown  Yes Yes   Sig: Take 500 mg by mouth 2 times daily (with meals)   metoprolol (LOPRESSOR) 25 MG tablet Unknown  No Yes   Sig: Take 1 tablet (25 mg) by mouth 2 times daily   order for DME   No No   Sig: Equipment being ordered: Walker Wheels () and Walker ()  Treatment Diagnosis: Impaired gait      Facility-Administered Medications: None          Allergy:     Allergies   Allergen Reactions    Seasonal Allergies           Inpatient Medications:   Scheduled Meds:  Current Facility-Administered Medications   Medication Dose Route Frequency Provider Last Rate Last Admin    atorvastatin (LIPITOR) tablet 20 mg  20 mg Oral Daily Ana Luisa Campos MD   20 mg at 08/07/24 0939    insulin aspart (NovoLOG) injection (RAPID ACTING)   Subcutaneous TID w/meals Oliveira, Lucio Gonzalez MD   5 Units at 08/07/24 0945    insulin aspart (NovoLOG) injection (RAPID ACTING)  1-7 Units Subcutaneous TID AC Oliveira, Lucio Gonzalez MD   1 Units at 08/07/24 0943    insulin aspart (NovoLOG) injection (RAPID ACTING)  1-5 Units Subcutaneous At Bedtime Oliveira, Lucio Gonzalez MD   2 Units at 08/04/24 2315    metoprolol tartrate (LOPRESSOR) tablet 25 mg  25 mg Oral BID Ana Luisa Campos MD   25 mg at 08/07/24 0939    pantoprazole (PROTONIX) EC tablet 40 mg  40 mg Oral QAM AC Jhoana Holland RPH   40 mg at 08/07/24 0939    QUEtiapine (SEROquel) tablet 25 mg  25 mg Oral At Bedtime Bienvenido Loya MD   25 mg at 08/06/24 2129     PRN Meds:   Current Facility-Administered Medications    Medication Dose Route Frequency Provider Last Rate Last Admin    acetaminophen (TYLENOL) tablet 650 mg  650 mg Oral Q4H PRN Oliveira, Lucio Gonzalez MD   650 mg at 08/03/24 1409    Or    acetaminophen (TYLENOL) Suppository 650 mg  650 mg Rectal Q4H PRN Oliveira, Lucio Gonzalez MD        bisacodyl (DULCOLAX) suppository 10 mg  10 mg Rectal Daily PRN Oliveira, Lucio Gonzalez MD        calcium carbonate (TUMS) chewable tablet 1,000 mg  1,000 mg Oral 4x Daily PRN Oliveira, Lucio Gonzalez MD        glucose gel 15-30 g  15-30 g Oral Q15 Min PRN Oliveira, Lucio Gonzalez MD        Or    dextrose 50 % injection 25-50 mL  25-50 mL Intravenous Q15 Min PRN Oliveira, Lucio Gonzalez MD        Or    glucagon injection 1 mg  1 mg Subcutaneous Q15 Min PRN Oliveira, Lucio Gonzalez MD        melatonin tablet 1 mg  1 mg Oral At Bedtime PRN Oliveira, Lucio Gonzalez MD   1 mg at 08/03/24 0155    naloxone (NARCAN) injection 0.2 mg  0.2 mg Intravenous Q2 Min PRN Oliveira, Lucio Gonzalez MD        Or    naloxone (NARCAN) injection 0.4 mg  0.4 mg Intravenous Q2 Min PRN Oliveira, Lucio Gonzalez MD        Or    naloxone (NARCAN) injection 0.2 mg  0.2 mg Intramuscular Q2 Min PRN Oliveira, Lucio Gonzalez MD        Or    naloxone (NARCAN) injection 0.4 mg  0.4 mg Intramuscular Q2 Min PRN Oliveira, Lucio Gonzalez MD        ondansetron (ZOFRAN ODT) ODT tab 4 mg  4 mg Oral Q6H PRN Oliveira, Lucio Gonzalez MD        Or    ondansetron (ZOFRAN) injection 4 mg  4 mg Intravenous Q6H PRN Oliveira, Lucio Gonzalez MD        polyethylene glycol (MIRALAX) Packet 17 g  17 g Oral BID PRN Oliveira, Lucio Gonzalez MD        prochlorperazine (COMPAZINE) injection 5 mg  5 mg Intravenous Q6H PRN Oliveira, Lucio Gonzalez MD        Or    prochlorperazine (COMPAZINE) tablet 5 mg  5 mg Oral Q6H PRN Oliveira, Lucio Gonzalez MD        Or    prochlorperazine (COMPAZINE) suppository 12.5 mg  12.5 mg Rectal Q12H PRN Oliveira, Lucio Gonzalez MD        QUEtiapine (SEROquel) quarter-tab 6.25 mg  6.25 mg Oral BID PRN Bienvenido Loya MD        senna-docusate  "(SENOKOT-S/PERICOLACE) 8.6-50 MG per tablet 1 tablet  1 tablet Oral BID PRN Oliveira, Lucio Gonzalez MD        Or    senna-docusate (SENOKOT-S/PERICOLACE) 8.6-50 MG per tablet 2 tablet  2 tablet Oral BID PRN Oliveira, Lucio Gonzalez MD                 Physical Exam:   Physical Exam   Vitals:  Height:5' 8.5\"  Weight:160 lbs 4.8 oz   Temp: 97.3  F (36.3  C) Temp src: Oral BP: (!) 138/93 Pulse: 78   Resp: 18 SpO2: 98 % O2 Device: None (Room air)    General Appearance: No acute distress, normal body habitus  Neuro Exam:  Mental Status Exam: Alert and oriented to month, hospital, and knows he is watching the [employer].  Able to follow simple commands but has difficulty with cross body commands.  Unable to tell me why he was brought to the hospital.  Some perseveration on testing the exam below.  Language and speech intact.   Cranial Nerves: Vision/visual fields intact to finger counting. Pupils are equal and reactive to light. Extraocular movements intact. Facial strength and sensation is normal. No jaw or tongue deviation.  Motor: Increased tone in the bilateral upper extremities, milder but still present increased tone in the lower extremities as well.  Tremor of the bilateral hands noted both at rest but also with positional testing.  Bradykinesia with finger tapping bilaterally.  Reflexes: Symmetrically intact. Plantar signs normal.  Sensory: Intact to light touch in 4 extremities.  Coordination: Coordination intact to finger-nose-finger.  Gait: Mildly wide-based gait with short strides and shuffling with reduced arm swing.  No appreciated tremor with walking.  Requires a gait belt with 1 assist.  Neck: No nuchal rigidity  Extremities: No clubbing, no cyanosis, no edema          Data:   ROUTINE IP LABS   CBC RESULTS:     Recent Labs   Lab 08/06/24  0442 08/05/24  0712 08/04/24  0547   WBC 9.4 11.9* 13.2*   RBC 3.77* 3.57* 3.65*   HGB 11.4* 10.7* 11.0*   HCT 34.9* 32.9* 33.2*    136* 123*     Basic Metabolic Panel:   Recent " Labs   Lab Test 08/07/24  0827 08/07/24  0557 08/07/24  0532 08/06/24  2127 08/06/24  0731 08/06/24  0442 08/05/24  1322 08/05/24  0712 08/04/24  0907 08/04/24  0547   NA  --   --   --   --   --  135  --  134*  --  137   POTASSIUM  --   --  3.8  --   --  3.7  --  3.6  --  3.9   CHLORIDE  --   --   --   --   --  97*  --  100  --  104   CO2  --   --   --   --   --  30*  --  24  --  24   BUN  --   --   --   --   --  15.6  --  23.8*  --  34.4*   CR  --   --   --   --   --  0.97  --  1.12  --  1.26*   * 123*  --  173*   < > 176*   < > 177*   < > 278*   MELBA  --   --   --   --   --  7.8*  --  7.7*  --  8.1*    < > = values in this interval not displayed.     Liver panel:  Recent Labs   Lab Test 08/04/24  0547 08/03/24  0603 08/02/24  2019 10/28/17  0020   PROTTOTAL 6.3* 7.0 7.4 7.5   ALBUMIN 3.6 4.0 4.4 3.7   BILITOTAL 0.4 1.0 1.3* 0.5   ALKPHOS 77 85 91 86   AST 54* 48* 45 33   ALT 56 50 57 50     Lipid Profile:No lab results found.  Thyroid Panel:  Recent Labs   Lab Test 08/03/24  0955 10/28/17  1222 10/28/17  0020   TSH 0.53 0.85 1.07      Vitamin B12: No lab results found.          IMAGING:   Independent interpretation of the following studies by myself as part of today's encounter.   CT head 8/2/24:   --FINDINGS:  INTRACRANIAL CONTENTS: No intracranial hemorrhage, extraaxial collection, or mass effect.  No CT evidence of acute infarct. Mild to moderate presumed chronic small vessel ischemic changes. Normal ventricles and sulci for age.   VISUALIZED ORBITS/SINUSES/MASTOIDS: Prior bilateral cataract surgery. Visualized portions of the orbits are otherwise unremarkable. Mild mucosal thickening scattered about the paranasal sinuses. No middle ear or mastoid effusion.  BONES/SOFT TISSUES: No acute abnormality.                                                             IMPRESSION:  1.  No acute intracranial process.    Time:  73 minutes evaluation and management.     eMe Ray M.D.  [facility]  Office [phone]

## 2024-08-07 NOTE — PLAN OF CARE
Alert to self, place, and time. Orientation seems to vary. Allow time for pt to respond. Pt talking to self at times. AVSS on RA. LS clear, but diminished. SBA w/ gb. Regular diet, assist w/ feeding. Adequate urine output, frequency noted. BM x1 this shift, passing flatus. Denies pain, nausea, SOB. Blanchable redness on coccyx. Bruise on R and L shin. PIV SL. K/Mg/Phos protocol, recheck tomorrow AM.

## 2024-08-07 NOTE — PROGRESS NOTES
[facility]  Hospitalist Progress Note  Bienvenido Loya MD  08/07/2024    Assessment & Plan   This is a 74-year-old male with medical history which includes depression, OCD, diabetes mellitus on metformin who was brought to the hospital on 8/3/2024 by family with concerns of lethargy and confusion and admitted to the hospital with sepsis and encephalopathy           Likely viral meningitis  Severe sepsis-resolved  -On admission presented with fever, confusion  -Initial lab work on admit showed sodium of 129, chloride of 91, creatinine of 1.45 with GFR of 51, total bilirubin of 1.3, CK3 2 3, glucose 163 and ALT AST and alkaline phosphatase normal, lactic acid of 1.4  - UA on admit was negative  - COVID-19, influenza and RSV negative  - CT head 8/2-normal  - Chest x-ray 8/3-no evidence of any pneumonia  - Blood cultures 8/2-negative   - Tick panel negative  - LP on admit-glucose of 79, protein elevated at 56.3, total nucleated cells 195, RBC 25  - Meningitis panel negative  - CSF aerobic bacterial culture-negative  - West Nile PCR Not detected  - On admit patient was started on broad-spectrum antibiotics and ID was consulted  - Antibiotic changed during the course of hospital stay along with steroids  - on exam does not seem to have any neck rigidity or stiffness and his headaches have significantly improved  - Patient seen by infectious disease team and antibiotics have been stopped and no further infectious disease follow-up needed        Hyponatremia-improved and hypochloremia-resolved  -Sodium on admit was 129 with chloride of 91  -Unclear if patient was given IV fluids but labs done on 8/3 AM showed serum osmolality of 290, urine sodium of 37 and urine osmolality of 370 with TSH of 0.53  -Patient was started on IV fluids and his sodium is improved to 134 with chloride of 100 and likely due to dehydration    Metabolic encephalopathy secondary to aseptic meningitis  Delirium  Patient  with altering periods of confusion and clarity and sleeping and noted poor sleep overnight.  - discontinued benzo and narcotics  - neurology consult noted, increase tone and tremors so further seroquel held   - suspected prolonged encephalopathy in context of CNS infection, atrophy with prolonged recover phase  - MRI today with contrast and possible EEG tomorrow  - continue supportive care, ensure sleep and wake cycles are adhered.     Elevated creatinine on CKD stage III  -Baseline creatinine is normal at 1.04-1.28  -Creatinine on admit was 1.45 and likely due to dehydration due to underlying sepsis and was given IV fluids and creatinine has normalized to baseline to 1.     Chronic anemia  Acute thrombocytopenia likely due to underlying sepsis  -Baseline hemoglobin in EMR-10.5-11.8  -Hemoglobin on admit was 11.8 with platelet count of 138  -Hemoglobin-11.8-10.7  -Platelet [phone]  -Will monitor closely     Diabetes mellitus  -PTA regimen includes metformin  -Continue with sliding scale insulin     History of OCD and depression  History of ECT treatment in 2017  -PTA regimen: Seroquel 50 twice daily, Ativan 0.5 mg 3 times daily as needed  -Psychiatry team consulted     Hypertension  Hyperlipidemia  -PTA regimen as per pharmacology: Amlodipine 5 mg twice daily, aspirin 81 mg, atorvastatin 20 mg daily, lisinopril 20 mg daily, metoprolol 25 twice daily  -Blood pressure is stable and will introduce atorvastatin and metoprolol and further medications can be reintroduced on 8/6 AM pending blood pressure levels     GERD  -PTA regimen includes 20 mg omeprazole     Physical deconditioning  -Seen by therapy teams and they have recommended TCU     Family communication   - I did speak with adrianne [phone] and she was updated      Diet: Combination Diet Regular Diet Adult    DVT Prophylaxis: Pneumatic Compression Devices  Spencer Catheter: Not present  Lines: None     Cardiac Monitoring: ACTIVE order. Indication:  "Tachyarrhythmias, acute (48 hours)  Code Status: Full Code          Clinically Significant Risk Factors          # Hypocalcemia: Lowest Ca = 7.7 mg/dL in last 2 days, will monitor and replace as appropriate       # Thrombocytopenia: Lowest platelets = 123 in last 2 days, will monitor for bleeding   # Hypertension: Noted on problem list             # DMII: A1C = 7.3 % (Ref range: <5.7 %) within past 6 months, PRESENT ON ADMISSION  # Overweight: Estimated body mass index is 25.73 kg/m  as calculated from the following:    Height as of this encounter: 1.74 m (5' 8.5\").    Weight as of this encounter: 77.9 kg (171 lb 11.8 oz)., PRESENT ON ADMISSION         Disposition Plan  -- anticipate to TCU     Medically Ready for Discharge: on  8/8 or 8/9    Disposition:     Interval History   -- slept better last night  -- discussed with RN  -- he continues to be confused but also noted increased tone and tremor per neurolgy    -Data reviewed today: I reviewed all new labs and imaging over the last 24 hours. I personally reviewed no images or EKG's today.    Physical Exam    , Blood pressure (!) 138/93, pulse 78, temperature 97.3  F (36.3  C), temperature source Oral, resp. rate 18, height 1.74 m (5' 8.5\"), weight 72.7 kg (160 lb 4.8 oz), SpO2 98%.  Vitals:    08/03/24 0803 08/07/24 0602   Weight: 77.9 kg (171 lb 11.8 oz) 72.7 kg (160 lb 4.8 oz)     Vital Signs with Ranges  Temp:  [97.3  F (36.3  C)-98.2  F (36.8  C)] 97.3  F (36.3  C)  Pulse:  [62-78] 78  Resp:  [18-20] 18  BP: (137-161)/(89-94) 138/93  SpO2:  [95 %-98 %] 98 %  I/O's Last 24 hours  I/O last 3 completed shifts:  In: 360 [P.O.:360]  Out: 150 [Urine:150]    Constitutional:  Arouses, but confused  Respiratory: Clear to auscultation bilaterally, no crackles or wheezing  Cardiovascular: Regular rate and rhythm, normal S1 and S2, and no murmur noted  GI: Normal bowel sounds, soft, non-distended, non-tender  Skin/Integumen: No rashes, no cyanosis, no edema  Other:  "     Medications   All medications were reviewed.  Current Facility-Administered Medications   Medication Dose Route Frequency Provider Last Rate Last Admin     Current Facility-Administered Medications   Medication Dose Route Frequency Provider Last Rate Last Admin    atorvastatin (LIPITOR) tablet 20 mg  20 mg Oral Daily Ana Luisa Campos MD   20 mg at 08/07/24 0939    insulin aspart (NovoLOG) injection (RAPID ACTING)   Subcutaneous TID w/meals Oliveira, Lucio Gonzalez MD   5 Units at 08/07/24 1349    insulin aspart (NovoLOG) injection (RAPID ACTING)  1-7 Units Subcutaneous TID AC Oliveira, Lucio Gonzalez MD   1 Units at 08/07/24 1352    insulin aspart (NovoLOG) injection (RAPID ACTING)  1-5 Units Subcutaneous At Bedtime Oliveira, Lucio Gonzalez MD   2 Units at 08/04/24 2315    metoprolol tartrate (LOPRESSOR) tablet 25 mg  25 mg Oral BID Ana Luisa Campos MD   25 mg at 08/07/24 0939    pantoprazole (PROTONIX) EC tablet 40 mg  [facility]   40 mg at 08/07/24 0939    [Held by provider] QUEtiapine (SEROquel) tablet 25 mg  25 mg Oral At Bedtime Bienvenido Loya MD   25 mg at 08/06/24 2129        Data   Recent Labs   Lab 08/07/24  1243 08/07/24  0827 08/07/24  0557 08/07/24  0532 08/06/24  0731 08/06/24  0442 08/05/24  1322 08/05/24  0712 08/04/24  0907 08/04/24  0547 08/03/24  0830 08/03/24  0603 08/03/24  0344 08/02/24 2020   WBC  --   --   --   --   --  9.4  --  11.9*  --  13.2*  --  9.1  --   --    HGB  --   --   --   --   --  11.4*  --  10.7*  --  11.0*  --  11.5*  --   --    MCV  --   --   --   --   --  93  --  92  --  91  --  89  --   --    PLT  --   --   --   --   --  159  --  136*  --  123*  --  120*  --   --    INR  --   --   --   --   --   --   --   --   --   --   --  1.15  --  1.12   NA  --   --   --   --   --  135  --  134*  --  137  --  132*  --   --    POTASSIUM  --   --   --  3.8  --  3.7  --  3.6  --  3.9  --  4.1  --   --    CHLORIDE  --   --   --   --   --  97*  --  100  --  104  --  97*  --    --    CO2  --   --   --   --   --  30*  --  24  --  24  --  22  --   --    BUN  --   --   --   --   --  15.6  --  23.8*  --  34.4*  --  23.9*  --   --    CR  --   --   --   --   --  0.97  --  1.12  --  1.26*  --  1.29*  --   --    ANIONGAP  --   --   --   --   --  8  --  10  --  9  --  13  --   --    MELBA  --   --   --   --   --  7.8*  --  7.7*  --  8.1*  --  8.0*  --   --    * 165* 123*  --    < > 176*   < > 177*   < > 278*   < > 226*   < >  --    ALBUMIN  --   --   --   --   --   --   --   --   --  3.6  --  4.0  --   --    PROTTOTAL  --   --   --   --   --   --   --   --   --  6.3*  --  7.0  --   --    BILITOTAL  --   --   --   --   --   --   --   --   --  0.4  --  1.0  --   --    ALKPHOS  --   --   --   --   --   --   --   --   --  77  --  85  --   --    ALT  --   --   --   --   --   --   --   --   --  56  --  50  --   --    AST  --   --   --   --   --   --   --   --   --  54*  --  48*  --   --     < > = values in this interval not displayed.       No results found for this or any previous visit (from the past 24 hour(s)).    Bienvenido Loya MD  Text Page  (7am to 6pm)

## 2024-08-07 NOTE — PROGRESS NOTES
Care Management Follow Up    Length of Stay (days): 5    Expected Discharge Date: 08/09/2024     Concerns to be Addressed: discharge planning     Patient plan of care discussed at interdisciplinary rounds: Yes    Anticipated Discharge Disposition: Transitional Care     Anticipated Discharge Services: None  Anticipated Discharge DME: None    Patient/family educated on Medicare website which has current facility and service quality ratings: no  Education Provided on the Discharge Plan: Yes  Patient/Family in Agreement with the Plan: yes    Referrals Placed by CM/SW: Post Acute Facilities  Private pay costs discussed: Not applicable    Additional Information:  No accepting TCU yet. Voicemail left at [facility]. SW updated daughter, Nela. Goal remains TCU as pt would not have 24/7 supervision at home. Additional TCU referrals sent. SW following for discharge planning.    PRIYA Enriquez, [facility]  [phone] Desk phone  [phone] Cell/text (Preferred)  [facility]

## 2024-08-08 ENCOUNTER — HOSPITAL ENCOUNTER (INPATIENT)
Dept: NEUROLOGY | Facility: CLINIC | Age: 74
Discharge: HOME | DRG: 871 | End: 2024-08-08
Attending: STUDENT IN AN ORGANIZED HEALTH CARE EDUCATION/TRAINING PROGRAM
Payer: MEDICARE

## 2024-08-08 LAB
BACTERIA CSF CULT: NO GROWTH
GLUCOSE BLDC GLUCOMTR-MCNC: 149 MG/DL (ref 70–99)
GLUCOSE BLDC GLUCOMTR-MCNC: 161 MG/DL (ref 70–99)
GLUCOSE BLDC GLUCOMTR-MCNC: 205 MG/DL (ref 70–99)
GLUCOSE BLDC GLUCOMTR-MCNC: 270 MG/DL (ref 70–99)
GRAM STAIN RESULT: NORMAL
GRAM STAIN RESULT: NORMAL
MAGNESIUM SERPL-MCNC: 1.8 MG/DL (ref 1.7–2.3)
PHOSPHATE SERPL-MCNC: 3.3 MG/DL (ref 2.5–4.5)
POTASSIUM SERPL-SCNC: 4 MMOL/L (ref 3.4–5.3)

## 2024-08-08 PROCEDURE — 84132 ASSAY OF SERUM POTASSIUM: CPT | Performed by: INTERNAL MEDICINE

## 2024-08-08 PROCEDURE — 250N000013 HC RX MED GY IP 250 OP 250 PS 637

## 2024-08-08 PROCEDURE — 83735 ASSAY OF MAGNESIUM: CPT | Performed by: INTERNAL MEDICINE

## 2024-08-08 PROCEDURE — 84100 ASSAY OF PHOSPHORUS: CPT | Performed by: INTERNAL MEDICINE

## 2024-08-08 PROCEDURE — 250N000013 HC RX MED GY IP 250 OP 250 PS 637: Performed by: HOSPITALIST

## 2024-08-08 PROCEDURE — 99232 SBSQ HOSP IP/OBS MODERATE 35: CPT | Performed by: INTERNAL MEDICINE

## 2024-08-08 PROCEDURE — 95816 EEG AWAKE AND DROWSY: CPT

## 2024-08-08 PROCEDURE — 36415 COLL VENOUS BLD VENIPUNCTURE: CPT | Performed by: INTERNAL MEDICINE

## 2024-08-08 PROCEDURE — 120N000001 HC R&B MED SURG/OB

## 2024-08-08 RX ADMIN — INSULIN ASPART 1 UNITS: 100 INJECTION, SOLUTION INTRAVENOUS; SUBCUTANEOUS at 18:24

## 2024-08-08 RX ADMIN — METOPROLOL TARTRATE 25 MG: 25 TABLET, FILM COATED ORAL at 09:07

## 2024-08-08 RX ADMIN — ATORVASTATIN CALCIUM 20 MG: 20 TABLET, FILM COATED ORAL at 09:07

## 2024-08-08 RX ADMIN — PANTOPRAZOLE SODIUM 40 MG: 40 TABLET, DELAYED RELEASE ORAL at 09:07

## 2024-08-08 RX ADMIN — METOPROLOL TARTRATE 25 MG: 25 TABLET, FILM COATED ORAL at 20:31

## 2024-08-08 RX ADMIN — INSULIN ASPART 3 UNITS: 100 INJECTION, SOLUTION INTRAVENOUS; SUBCUTANEOUS at 12:55

## 2024-08-08 RX ADMIN — INSULIN ASPART 2 UNITS: 100 INJECTION, SOLUTION INTRAVENOUS; SUBCUTANEOUS at 09:13

## 2024-08-08 ASSESSMENT — ACTIVITIES OF DAILY LIVING (ADL)
ADLS_ACUITY_SCORE: 48
ADLS_ACUITY_SCORE: 50
ADLS_ACUITY_SCORE: 48
ADLS_ACUITY_SCORE: 50
ADLS_ACUITY_SCORE: 48
ADLS_ACUITY_SCORE: 48
ADLS_ACUITY_SCORE: 50
ADLS_ACUITY_SCORE: 48
ADLS_ACUITY_SCORE: 50
ADLS_ACUITY_SCORE: 48
ADLS_ACUITY_SCORE: 50
ADLS_ACUITY_SCORE: 50
ADLS_ACUITY_SCORE: 48
ADLS_ACUITY_SCORE: 48
ADLS_ACUITY_SCORE: 50

## 2024-08-08 NOTE — PLAN OF CARE
"Alert to self and place, orientation seems to vary. Allow time for pt to respond. Pt talking to self at times, repetitive requests asking for food and to \"name the organization.\" Neuros intact, pt needs many ques to follow commands. AVSS on RA. LS clear, but diminished. SBA w/ gb. Regular diet, assist w/ feeding at times. Adequate urine output, frequency noted. BM x1 this shift, passing flatus. Denies pain, nausea, SOB. Blanchable redness on coccyx. Bruise on R and L shin. PIV SL. K/Mg/Phos protocol, recheck tomorrow AM. EEG completed.     "

## 2024-08-08 NOTE — PLAN OF CARE
Goal Outcome Evaluation:    Orientations: alert & orientated to self. Confused and restless most of the night.   Vitals/Pain: Vitals stable on RA. Denies pain.  Tele: n/a  Lungs: Clear, diminished.  Diet: regular.   Lines/Drains: R PIV, SL  Skin/Wounds: Blanchable redness on coccyx/sacrum. Bruising on R and L shin.   GI/: active BS, had BM, adequately voiding to the bathroom. Urinary frequency noted.   Labs: Abnormal/Trends, Electrolyte Replacement- On K/Mg/ Phos protocols, recheck 8/9 am. MRI Brain w/o contrast completed.   Ambulation/Assist: Up with SBA.   Sleep Quality: fair.  Plan: Ensure pt.safety, reorientation. Possible EEG today.

## 2024-08-08 NOTE — PROGRESS NOTES
[facility]  Hospitalist Progress Note  Bienvenido Loya MD  08/08/2024    Assessment & Plan   This is a 74-year-old male with medical history which includes depression, OCD, diabetes mellitus on metformin who was brought to the hospital on 8/3/2024 by family with concerns of lethargy and confusion and admitted to the hospital with sepsis and encephalopathy           Likely viral meningitis  Severe sepsis-resolved  -On admission presented with fever, confusion  -Initial lab work on admit showed sodium of 129, chloride of 91, creatinine of 1.45 with GFR of 51, total bilirubin of 1.3, CK3 2 3, glucose 163 and ALT AST and alkaline phosphatase normal, lactic acid of 1.4  - UA on admit was negative  - COVID-19, influenza and RSV negative  - CT head 8/2-normal  - Chest x-ray 8/3-no evidence of any pneumonia  - Blood cultures 8/2-negative   - Tick panel negative  - LP on admit-glucose of 79, protein elevated at 56.3, total nucleated cells 195, RBC 25  - Meningitis panel negative  - CSF aerobic bacterial culture-negative  - West Nile PCR Not detected  - On admit patient was started on broad-spectrum antibiotics and ID was consulted  - Antibiotic changed during the course of hospital stay along with steroids  - on exam does not seem to have any neck rigidity or stiffness and his headaches have significantly improved  - Patient seen by infectious disease team and antibiotics have been stopped and no further infectious disease follow-up needed        Hyponatremia-improved and hypochloremia-resolved  -Sodium on admit was 129 with chloride of 91  -Unclear if patient was given IV fluids but labs done on 8/3 AM showed serum osmolality of 290, urine sodium of 37 and urine osmolality of 370 with TSH of 0.53  -Patient was started on IV fluids and his sodium is improved to 134 with chloride of 100 and likely due to dehydration    Metabolic encephalopathy secondary to aseptic meningitis  Delirium  Patient  with altering periods of confusion and clarity and sleeping and noted poor sleep overnight.  - discontinued benzo and narcotics  - neurology consult noted, increase tone and tremors so further seroquel held   - suspected prolonged encephalopathy in context of CNS infection, atrophy with prolonged recover phase  - MRI today with contrast and possible EEG tomorrow  - continue supportive care, ensure sleep and wake cycles are adhered.     Elevated creatinine on CKD stage III  -Baseline creatinine is normal at 1.04-1.28  -Creatinine on admit was 1.45 and likely due to dehydration due to underlying sepsis and was given IV fluids and creatinine has normalized to baseline to 1.     Chronic anemia  Acute thrombocytopenia likely due to underlying sepsis  -Baseline hemoglobin in EMR-10.5-11.8  -Hemoglobin on admit was 11.8 with platelet count of 138  -Hemoglobin-11.8-10.7  -Platelet byamv-[phone]  -Will monitor closely     Diabetes mellitus  -PTA regimen includes metformin  -Continue with sliding scale insulin     History of OCD and depression  History of ECT treatment in 2017  -PTA regimen: Seroquel 50 twice daily, Ativan 0.5 mg 3 times daily as needed  -Psychiatry team consulted     Hypertension  Hyperlipidemia  -PTA regimen as per pharmacology: Amlodipine 5 mg twice daily, aspirin 81 mg, atorvastatin 20 mg daily, lisinopril 20 mg daily, metoprolol 25 twice daily  -Blood pressure is stable and will introduce atorvastatin and metoprolol and further medications can be reintroduced on 8/6 AM pending blood pressure levels     GERD  -PTA regimen includes 20 mg omeprazole     Physical deconditioning  -Seen by therapy teams and they have recommended TCU     Family communication   - I did speak with adrianne [phone] and she was updated      Diet: Combination Diet Regular Diet Adult    DVT Prophylaxis: Pneumatic Compression Devices  Spencer Catheter: Not present  Lines: None     Cardiac Monitoring: ACTIVE order. Indication:  "Tachyarrhythmias, acute (48 hours)  Code Status: Full Code          Clinically Significant Risk Factors          # Hypocalcemia: Lowest Ca = 7.7 mg/dL in last 2 days, will monitor and replace as appropriate       # Thrombocytopenia: Lowest platelets = 123 in last 2 days, will monitor for bleeding   # Hypertension: Noted on problem list             # DMII: A1C = 7.3 % (Ref range: <5.7 %) within past 6 months, PRESENT ON ADMISSION  # Overweight: Estimated body mass index is 25.73 kg/m  as calculated from the following:    Height as of this encounter: 1.74 m (5' 8.5\").    Weight as of this encounter: 77.9 kg (171 lb 11.8 oz)., PRESENT ON ADMISSION         Disposition Plan  -- anticipate to TCU     Medically Ready for Discharge: on  8/9 to TCU    Disposition:     Interval History   -- restless last night but sleeping on and off today  -- discussed with RN  -- he awakes and is improved, able to tell me his daughter visited him yesterday and stayed for quite a while.    -Data reviewed today: I reviewed all new labs and imaging over the last 24 hours. I personally reviewed no images or EKG's today.    Physical Exam    , Blood pressure 132/81, pulse 80, temperature 98.2  F (36.8  C), temperature source Oral, resp. rate 16, height 1.74 m (5' 8.5\"), weight 73.3 kg (161 lb 11.2 oz), SpO2 97%.  Vitals:    08/03/24 0803 08/07/24 0602 08/07/24 2354   Weight: 77.9 kg (171 lb 11.8 oz) 72.7 kg (160 lb 4.8 oz) 73.3 kg (161 lb 11.2 oz)     Vital Signs with Ranges  Temp:  [97.9  F (36.6  C)-98.3  F (36.8  C)] 98.2  F (36.8  C)  Pulse:  [60-84] 80  Resp:  [16-18] 16  BP: (132-170)/() 132/81  SpO2:  [96 %-98 %] 97 %  I/O's Last 24 hours  I/O last 3 completed shifts:  In: 840 [P.O.:840]  Out: 880 [Urine:880]    Constitutional:  Arouses, much less confused, able to put a sentence together  Respiratory: Clear to auscultation bilaterally, no crackles or wheezing  Cardiovascular: Regular rate and rhythm, normal S1 and S2, and no murmur " noted  GI: Normal bowel sounds, soft, non-distended, non-tender  Skin/Integumen: No rashes, no cyanosis, no edema  Other:      Medications   All medications were reviewed.  Current Facility-Administered Medications   Medication Dose Route Frequency Provider Last Rate Last Admin     Current Facility-Administered Medications   Medication Dose Route Frequency Provider Last Rate Last Admin    atorvastatin (LIPITOR) tablet 20 mg  20 mg Oral Daily Ana Luisa Campos MD   20 mg at 08/08/24 0907    insulin aspart (NovoLOG) injection (RAPID ACTING)   Subcutaneous TID w/meals Oliveira, Lucio Gonzalez MD   8 Units at 08/08/24 0910    insulin aspart (NovoLOG) injection (RAPID ACTING)  1-7 Units Subcutaneous TID AC Oliveira, Lucio Gonzalez MD   3 Units at 08/08/24 1255    insulin aspart (NovoLOG) injection (RAPID ACTING)  1-5 Units Subcutaneous At Bedtime Oliveira, Lucio Gonzalez MD   2 Units at 08/04/24 2315    metoprolol tartrate (LOPRESSOR) tablet 25 mg  25 mg Oral BID Ana Luisa Campos MD   25 mg at 08/08/24 0907    pantoprazole (PROTONIX) EC tablet 40 mg  40 mg Oral QAM  Jhoana Holland [facility]   40 mg at 08/08/24 0907    [Held by provider] QUEtiapine (SEROquel) tablet 25 mg  25 mg Oral At Bedtime Bienvenido Loya MD   25 mg at 08/06/24 2129        Data   Recent Labs   Lab 08/08/24  1253 08/08/24  0905 08/08/24  0450 08/08/24  0207 08/07/24  0557 08/07/24  0532 08/06/24  0731 08/06/24  0442 08/05/24  1322 08/05/24  0712 08/04/24  0907 08/04/24  0547 08/03/24  0830 08/03/24  0603 08/03/24  0344 08/02/24 2020   WBC  --   --   --   --   --   --   --  9.4  --  11.9*  --  13.2*  --  9.1  --   --    HGB  --   --   --   --   --   --   --  11.4*  --  10.7*  --  11.0*  --  11.5*  --   --    MCV  --   --   --   --   --   --   --  93  --  92  --  91  --  89  --   --    PLT  --   --   --   --   --   --   --  159  --  136*  --  123*  --  120*  --   --    INR  --   --   --   --   --   --   --   --   --   --   --   --   --  1.15  --  1.12   NA  --   --    --   --   --   --   --  135  --  134*  --  137  --  132*  --   --    POTASSIUM  --   --  4.0  --   --  3.8  --  3.7  --  3.6  --  3.9  --  4.1  --   --    CHLORIDE  --   --   --   --   --   --   --  97*  --  100  --  104  --  97*  --   --    CO2  --   --   --   --   --   --   --  30*  --  24  --  24  --  22  --   --    BUN  --   --   --   --   --   --   --  15.6  --  23.8*  --  34.4*  --  23.9*  --   --    CR  --   --   --   --   --   --   --  0.97  --  1.12  --  1.26*  --  1.29*  --   --    ANIONGAP  --   --   --   --   --   --   --  8  --  10  --  9  --  13  --   --    MELBA  --   --   --   --   --   --   --  7.8*  --  7.7*  --  8.1*  --  8.0*  --   --    * 205*  --  161*   < >  --    < > 176*   < > 177*   < > 278*   < > 226*   < >  --    ALBUMIN  --   --   --   --   --   --   --   --   --   --   --  3.6  --  4.0  --   --    PROTTOTAL  --   --   --   --   --   --   --   --   --   --   --  6.3*  --  7.0  --   --    BILITOTAL  --   --   --   --   --   --   --   --   --   --   --  0.4  --  1.0  --   --    ALKPHOS  --   --   --   --   --   --   --   --   --   --   --  77  --  85  --   --    ALT  --   --   --   --   --   --   --   --   --   --   --  56  --  50  --   --    AST  --   --   --   --   --   --   --   --   --   --   --  54*  --  48*  --   --     < > = values in this interval not displayed.       Recent Results (from the past 24 hour(s))   MR Brain w/o Contrast    Narrative    EXAM: MR BRAIN W/O CONTRAST  LOCATION: [facility]  DATE: 8/7/2024    INDICATION: AMS, confusion  COMPARISON: 8/2/2024  TECHNIQUE: Routine multiplanar multisequence head MRI without intravenous contrast.    FINDINGS:  INTRACRANIAL CONTENTS: No acute or subacute infarct. No mass, acute hemorrhage, or extra-axial fluid collections. Patchy nonspecific T2/FLAIR hyperintensities within the cerebral white matter most consistent with mild to moderate chronic microvascular   ischemic change. Mild generalized  cerebral atrophy. No hydrocephalus. Normal position of the cerebellar tonsils.     SELLA: No abnormality accounting for technique.    OSSEOUS STRUCTURES/SOFT TISSUES: Normal marrow signal. The major intracranial vascular flow voids are maintained.     ORBITS: No abnormality accounting for technique.     SINUSES/MASTOIDS: Mild mucosal thickening scattered about the paranasal sinuses. No middle ear or mastoid effusion.       Impression    IMPRESSION:  1.  No acute intracranial process.  2.  Generalized brain atrophy and presumed microvascular ischemic changes as detailed above.       Bienvenido Loya MD  Text Page  (7am to 6pm)

## 2024-08-08 NOTE — PROGRESS NOTES
[facility]  Neurology Daily Note      Admission Date:8/2/2024   Date of service: 08/08/2024   Hospital Day: 7                                                   Assessment and Plan:   #Encephalopathy, delirium   Likely still from aseptic meningitis was found on LP.  ID signed off given no signs of bacterial infection.  Headache is improving as well as fevers.  No finding settings of any septic source outside of CSF. I suspect this is most likely contributing in addition to delirium.  Mild atrophy but appropriate for age, this can also delay recovery from infectious and metabolic abnormalities.  On initial exam patient did have increased muscle tone as well as tremors appearing at rest and with some positional testing.  This is improved since stopping Seroquel but still mildly present. This should be worked up in the outpatient setting, but those who have a susceptibility for parkinsonism side effects of antipsychotics as well as susceptibility for delirium may be the first presentation of Lewy body dementia.  Again, this should be an outpatient follow-up with neurology and neuropsychiatry for assessment.  His prior neuropsych testing in 2013 did reveal anomia that was mild as well as some difficulty with complex attentional processing.  I suspect these deficits may have worsened in that time frame and may be heightened in the setting of infection.   -Continue holding Seroquel as patient seems to be susceptible to this, he has had improvement in his exam.  He continues have mild cogwheeling and bradykinesia as well as a tremor, but better than the day prior, so it is possible that his underlying initial aseptic meningitis has revealed a underlying degenerative disorder that should be evaluated and treated in the outpatient setting.   -Patient's with underlying degenerative disorders, or more susceptible to delirium.  Delirium precautions as below.    The following are useful supportive measures of delirium  management [[place] Med 32:257-263, 2000]  - limit psychoactive medications (i.e. benzodiazepines, opiates, anticholinergics)  - frequent reorientation  - optimize interpersonal contact/communication: reorientation, simple instructions, frequent eye contact  - reduce sensory deficits with any hearing aids or glasses  - encourage independence and mobility as appropriate  - avoid restraints as able  - reduce room/staff changes  - quiet environment with a restful nocturnal room environment  - reinforcement of sleep/wake cycle - lights on, shades open during the day    - provide and update orientation board each shift with names of care team members and daily schedule  - frequent re-orientation with encouragement of visitors  - keep blinds open, lights and TV on during the day, keep patient active when possible  - sleep protocol (warm drink, relaxation tapes, massage, avoid meds/interruptions during sleep, noise reduction)  - limit deliriogenic meds (anti-cholinergics, excessive opiates, and benzodiadepines)  - get up in chair as much as possible as tolerated by behaviors  - engage patient in cognitively stimulating activities (current events, reminiscence activities, word games, art activities)  - ambulation, active range of motion exercises  - provide or repair visual or hearing aids with encouragement for daily use  - treat pain effectively  -ensure constipation is avoided, recommend schedule bowel regimen  -maintain adequate hydration within fluid recommendations as per primary team  - explain to patient anything that is being done to him/her prior to attempting    -MRI brain without contrast showed mild atrophy but no significant findings outside of that.  -EEG pending  -Recommend outpatient neurology follow-up and neuropsychiatry referral        Interval History:   Patient overnight was up and this morning continues to be confused. MRI negative for any stroke but showed generalized atrophy, mild for age. EEG obtained  today.  MRI was unable to be obtained with contrast due to patient getting agitated with MRI.  He continues to endorse no headaches or significant pain.         Medications:   Scheduled Meds:  Current Facility-Administered Medications   Medication Dose Route Frequency Provider Last Rate Last Admin    atorvastatin (LIPITOR) tablet 20 mg  20 mg Oral Daily Ana Luisa Campos MD   20 mg at 08/08/24 0907    insulin aspart (NovoLOG) injection (RAPID ACTING)   Subcutaneous TID w/meals Oliveira, Lucio Gonzalez MD   8 Units at 08/08/24 0910    insulin aspart (NovoLOG) injection (RAPID ACTING)  1-7 Units Subcutaneous TID AC Oliveira, Lucio Gonzalez MD   2 Units at 08/08/24 0913    insulin aspart (NovoLOG) injection (RAPID ACTING)  1-5 Units Subcutaneous At Bedtime Oliveira, Lucio Gonzalez MD   2 Units at 08/04/24 2315    metoprolol tartrate (LOPRESSOR) tablet 25 mg  25 mg Oral BID Ana Luisa Campos MD   25 mg at 08/08/24 0907    pantoprazole (PROTONIX) EC tablet 40 mg  [facility], [facility]   40 mg at 08/08/24 0907    [Held by provider] QUEtiapine (SEROquel) tablet 25 mg  25 mg Oral At Bedtime Bienvenido Loya MD   25 mg at 08/06/24 2129     PRN Meds:   Current Facility-Administered Medications   Medication Dose Route Frequency Provider Last Rate Last Admin    acetaminophen (TYLENOL) tablet 650 mg  650 mg Oral Q4H PRN Tee, Lucio Gonzalez MD   650 mg at 08/03/24 1409    Or    acetaminophen (TYLENOL) Suppository 650 mg  650 mg Rectal Q4H PRN Tee, Lucio Gonzalez MD        bisacodyl (DULCOLAX) suppository 10 mg  10 mg Rectal Daily PRN Tee, Lucio Gonzalez MD        calcium carbonate (TUMS) chewable tablet 1,000 mg  1,000 mg Oral 4x Daily PRN Tee, Lucio Gonzalez MD        glucose gel 15-30 g  15-30 g Oral Q15 Min PRN Tee, Lucio Gonzalez MD        Or    dextrose 50 % injection 25-50 mL  25-50 mL Intravenous Q15 Min PRN Lucio Oliveira MD        Or    glucagon injection 1 mg  1 mg Subcutaneous Q15 Min PRN Lucio Oliveira MD         melatonin tablet 1 mg  1 mg Oral At Bedtime PRN Oliveira, Lucio Gonzalez MD   1 mg at 08/03/24 0155    naloxone (NARCAN) injection 0.2 mg  0.2 mg Intravenous Q2 Min PRN Oliveira, Lucio Gonzalez MD        Or    naloxone (NARCAN) injection 0.4 mg  0.4 mg Intravenous Q2 Min PRN Oliveira, Lucio Gonzalez MD        Or    naloxone (NARCAN) injection 0.2 mg  0.2 mg Intramuscular Q2 Min PRN Oliveria, Lucio Gonzalez MD        Or    naloxone (NARCAN) injection 0.4 mg  0.4 mg Intramuscular Q2 Min PRN Oliveira, Lucio Gonzalez MD        ondansetron (ZOFRAN ODT) ODT tab 4 mg  4 mg Oral Q6H PRN Oliveira, Lucio Gonzalez MD        Or    ondansetron (ZOFRAN) injection 4 mg  4 mg Intravenous Q6H PRN Oliveira, Lucio Gonzalez MD        polyethylene glycol (MIRALAX) Packet 17 g  17 g Oral BID PRN Oliveira, Lucio Gonzalez MD        prochlorperazine (COMPAZINE) injection 5 mg  5 mg Intravenous Q6H PRN Oliveira, Lucio Gonzalez MD        Or    prochlorperazine (COMPAZINE) tablet 5 mg  5 mg Oral Q6H PRN Oliveira, Lucio Gonzalez MD        Or    prochlorperazine (COMPAZINE) suppository 12.5 mg  12.5 mg Rectal Q12H PRN Oliveira, Lucio Gonzalez MD        [Held by provider] QUEtiapine (SEROquel) quarter-tab 6.25 mg  6.25 mg Oral BID PRN Bienvenido Loya MD        senna-docusate (SENOKOT-S/PERICOLACE) 8.6-50 MG per tablet 1 tablet  1 tablet Oral BID PRN Oliveira, Lucio Gonzalez MD        Or    senna-docusate (SENOKOT-S/PERICOLACE) 8.6-50 MG per tablet 2 tablet  2 tablet Oral BID PRN Oliveira, Lucio Gonzalez MD               Physical Exam:   Vitals: Temp: 98.3  F (36.8  C) Temp src: Oral BP: (!) 143/86 Pulse: 82   Resp: 16 SpO2: 96 % O2 Device: None (Room air)    Vital Signs with Ranges: Temp:  [97.9  F (36.6  C)-98.3  F (36.8  C)] 98.3  F (36.8  C)  Pulse:  [60-84] 82  Resp:  [16-18] 16  BP: (134-170)/() 143/86  SpO2:  [96 %-98 %] 96 %    General Appearance:  No acute distress  Neuro:           Mental Status Exam: Alert and oriented to month, year, hospital, president.  Surprisingly did not get his birthdate correct.   Able to follow simple commands but has difficulty with cross body commands.  Today he was able to tell me he was brought to the hospital due to confusion.  Some perseveration on testing the exam below.  Language and speech intact.   Cranial Nerves: Extraocular movements intact, but saccadic pursuits noted in the horizontal fields.  Facial strength and sensation is normal. No jaw or tongue deviation.  Motor: Increased tone in the bilateral upper extremities with some cogwheeling but improved compared to the day prior.  No significant tremor of the bilateral hands noted with positional testing, very mildly noted at rest.  Bradykinesia with finger tapping bilaterally.  Coordination: Coordination intact to finger-nose-finger.  Gait: Not tested today but previously was wide-based gait with short strides and shuffling with reduced arm swing.  No appreciated tremor with walking.  Requires a gait belt with 1 assist.  Neck: No nuchal rigidity  Extremities: No clubbing, no cyanosis, no edema        Data:   ROUTINE IP LABS (Last 3results)  CBC RESULTS:     Recent Labs   Lab Test 08/06/24 0442 08/05/24  0712 08/04/24  0547   WBC 9.4 11.9* 13.2*   RBC 3.77* 3.57* 3.65*   HGB 11.4* 10.7* 11.0*   HCT 34.9* 32.9* 33.2*    136* 123*     Basic Metabolic Panel:  Recent Labs   Lab Test 08/08/24  0905 08/08/24  0450 08/08/24  0207 08/07/24  2142 08/07/24  0557 08/07/24  0532 08/06/24  0731 08/06/24  0442 08/05/24  1322 08/05/24  0712 08/04/24  0907 08/04/24  0547   NA  --   --   --   --   --   --   --  135  --  134*  --  137   POTASSIUM  --  4.0  --   --   --  3.8  --  3.7  --  3.6  --  3.9   CHLORIDE  --   --   --   --   --   --   --  97*  --  100  --  104   CO2  --   --   --   --   --   --   --  30*  --  24  --  24   BUN  --   --   --   --   --   --   --  15.6  --  23.8*  --  34.4*   CR  --   --   --   --   --   --   --  0.97  --  1.12  --  1.26*   *  --  161* 156*   < >  --    < > 176*   < > 177*   < > 278*   MELBA  --    --   --   --   --   --   --  7.8*  --  7.7*  --  8.1*    < > = values in this interval not displayed.     Liver panel:  Recent Labs   Lab Test 08/04/24  0547 08/03/24  0603 08/02/24  2019 10/28/17  0020   PROTTOTAL 6.3* 7.0 7.4 7.5   ALBUMIN 3.6 4.0 4.4 3.7   BILITOTAL 0.4 1.0 1.3* 0.5   ALKPHOS 77 85 91 86   AST 54* 48* 45 33   ALT 56 50 57 50     Thyroid Panel:  Recent Labs   Lab Test 08/03/24  0955 10/28/17  1222 10/28/17  0020   TSH 0.53 0.85 1.07      Vitamin B12: No lab results found.   Ammonia: No lab results found.     IMAGING:   Independent interpretation of the following studies by myself as part of today's encounter.   MRI brain wo con  FINDINGS:  INTRACRANIAL CONTENTS: No acute or subacute infarct. No mass, acute hemorrhage, or extra-axial fluid collections. Patchy nonspecific T2/FLAIR hyperintensities within the cerebral white matter most consistent with mild to moderate chronic microvascular   ischemic change. Mild generalized cerebral atrophy. No hydrocephalus. Normal position of the cerebellar tonsils.      SELLA: No abnormality accounting for technique.     OSSEOUS STRUCTURES/SOFT TISSUES: Normal marrow signal. The major intracranial vascular flow voids are maintained.      ORBITS: No abnormality accounting for technique.      SINUSES/MASTOIDS: Mild mucosal thickening scattered about the paranasal sinuses. No middle ear or mastoid effusion.                                                                       IMPRESSION:  1.  No acute intracranial process.  2.  Generalized brain atrophy and presumed microvascular ischemic changes as detailed above.        TIME     55 minutes Evaluation/management time     Mee Ray M.D.  Neurologist  [facility] Neurology  Office [phone]

## 2024-08-08 NOTE — PROGRESS NOTES
[facility]  Infectious Disease Progress Note          Assessment and Plan:   Date of Admission:  8/2/2024  Date of Consult (When I saw the patient): 08/03/24        Assessment & Plan  Negro Aj is a 74 year old male who was admitted on 8/2/2024.      Impression: 1 74-year-old male with acute fever, confusion unclear etiology but lumbar puncture abnormal consistent with meningitis, mostly lymphocytes likely aseptic meningitis, meningitis profile negative, large differential diagnosis of infectious and noninfectious causes  2 major fever, septic like condition but procalcitonin low, blood cultures negative no obvious focal infection except the CNS process  3 diabetes mellitus  4 ATN  5 major depression and psychiatric disease     REC 1 very unlikely bacterial  Antibiotics stopped  2 large differential diagnosis ,statistically most likely viral, improved so have been holding on a bigger workup  As of yesterday seemed significantly better, mentation improved, headache lasts no other focal symptoms and no positive microbiology    Worsened confusion intermittently(including now), no other focal neurologic abnormalities, no ongoing fever and no positive microbiology,  .  Given the initial fever and the statisticall probabilty and  CSF still likely viral ,with possibly confusion for other reasons but ongoing clinical symptoms raise possibility of an alternative diagnosis of aseptic meningitis which has a very large differential diagnosis of both infectious and noninfectious causes  , MRI negative, holding on a larger  ID workup for now, we will follow peripherally              Interval History:     Worsened mentation today both subjectively and objectively, no new focal symptoms no fever no cp, sob, n/v/d, or abd pain.  Temp down and headache better, no other focal symptoms micro all negative              Medications:     Current Facility-Administered Medications   Medication Dose  "Route Frequency Provider Last Rate Last Admin    atorvastatin (LIPITOR) tablet 20 mg  20 mg Oral Daily Ana Luisa Campos MD   20 mg at 08/07/24 0939    insulin aspart (NovoLOG) injection (RAPID ACTING)   Subcutaneous TID w/meals Oliveira, Lucio Gonzalez MD   5 Units at 08/07/24 1830    insulin aspart (NovoLOG) injection (RAPID ACTING)  1-7 Units Subcutaneous TID AC Oliveira, Lucio Gonzalez MD   1 Units at 08/07/24 1829    insulin aspart (NovoLOG) injection (RAPID ACTING)  1-5 Units Subcutaneous At Bedtime Oliveira, Lucio Gonzalez MD   2 Units at 08/04/24 2315    metoprolol tartrate (LOPRESSOR) tablet 25 mg  25 mg Oral BID Ana Luisa Campos MD   25 mg at 08/07/24 2327    pantoprazole (PROTONIX) EC tablet 40 mg  40 mg Oral QAM AC Jhoana Holland [facility]   40 mg at 08/07/24 0939    [Held by provider] QUEtiapine (SEROquel) tablet 25 mg  25 mg Oral At Bedtime Bienvenido Loya MD   25 mg at 08/06/24 2129                  Physical Exam:   Blood pressure (!) 170/97, pulse 60, temperature 98.3  F (36.8  C), temperature source Oral, resp. rate 16, height 1.74 m (5' 8.5\"), weight 73.3 kg (161 lb 11.2 oz), SpO2 96%.  Wt Readings from Last 2 Encounters:   08/07/24 73.3 kg (161 lb 11.2 oz)   11/14/17 81.5 kg (179 lb 11.2 oz)     Vital Signs with Ranges  Temp:  [97.9  F (36.6  C)-98.3  F (36.8  C)] 98.3  F (36.8  C)  Pulse:  [60-84] 60  Resp:  [16-18] 16  BP: (134-170)/() 170/97  SpO2:  [96 %-98 %] 96 %    Constitutional: Awake, alert, cooperative, no apparent distress     Lungs: Clear to auscultation bilaterally, no crackles or wheezing   Cardiovascular: Regular rate and rhythm, normal S1 and S2, and no murmur noted   Abdomen: Normal bowel sounds, soft, non-distended, non-tender   Skin: No rashes, no cyanosis, no edema   Other:           Data:   All microbiology laboratory data reviewed.  Recent Labs   Lab Test 08/06/24  0442 08/05/24  0712 08/04/24  0547   WBC 9.4 11.9* 13.2*   HGB 11.4* 10.7* 11.0*   HCT 34.9* 32.9* 33.2*   MCV 93 92 91 " "   136* 123*     Recent Labs   Lab Test 08/06/24  0442 08/05/24  0712 08/04/24  0547   CR 0.97 1.12 1.26*     No lab results found.  No lab results found.    Invalid input(s): \"UC\"     "

## 2024-08-08 NOTE — PROGRESS NOTES
Even with Ativan patient was unable to complete his MRI. Patient continues to want to sit up and get off the table during the exam.  I was only able to obtain an MRI Brain without contrast.

## 2024-08-09 ENCOUNTER — APPOINTMENT (OUTPATIENT)
Dept: OCCUPATIONAL THERAPY | Facility: CLINIC | Age: 74
DRG: 871 | End: 2024-08-09
Attending: STUDENT IN AN ORGANIZED HEALTH CARE EDUCATION/TRAINING PROGRAM
Payer: MEDICARE

## 2024-08-09 LAB
ALBUMIN UR-MCNC: NEGATIVE MG/DL
APPEARANCE UR: CLEAR
BILIRUB UR QL STRIP: NEGATIVE
COLOR UR AUTO: NORMAL
GLUCOSE BLDC GLUCOMTR-MCNC: 114 MG/DL (ref 70–99)
GLUCOSE BLDC GLUCOMTR-MCNC: 136 MG/DL (ref 70–99)
GLUCOSE BLDC GLUCOMTR-MCNC: 137 MG/DL (ref 70–99)
GLUCOSE BLDC GLUCOMTR-MCNC: 199 MG/DL (ref 70–99)
GLUCOSE BLDC GLUCOMTR-MCNC: 231 MG/DL (ref 70–99)
GLUCOSE UR STRIP-MCNC: NEGATIVE MG/DL
HGB UR QL STRIP: NEGATIVE
KETONES UR STRIP-MCNC: NEGATIVE MG/DL
LEUKOCYTE ESTERASE UR QL STRIP: NEGATIVE
MAGNESIUM SERPL-MCNC: 1.8 MG/DL (ref 1.7–2.3)
NITRATE UR QL: NEGATIVE
PH UR STRIP: 6.5 [PH] (ref 5–7)
PHOSPHATE SERPL-MCNC: 2.9 MG/DL (ref 2.5–4.5)
POTASSIUM SERPL-SCNC: 4.1 MMOL/L (ref 3.4–5.3)
SP GR UR STRIP: 1.02 (ref 1–1.03)
UROBILINOGEN UR STRIP-MCNC: NORMAL MG/DL

## 2024-08-09 PROCEDURE — 120N000001 HC R&B MED SURG/OB

## 2024-08-09 PROCEDURE — 84132 ASSAY OF SERUM POTASSIUM: CPT | Performed by: INTERNAL MEDICINE

## 2024-08-09 PROCEDURE — 99232 SBSQ HOSP IP/OBS MODERATE 35: CPT | Performed by: INTERNAL MEDICINE

## 2024-08-09 PROCEDURE — 250N000013 HC RX MED GY IP 250 OP 250 PS 637

## 2024-08-09 PROCEDURE — 83735 ASSAY OF MAGNESIUM: CPT | Performed by: INTERNAL MEDICINE

## 2024-08-09 PROCEDURE — 250N000013 HC RX MED GY IP 250 OP 250 PS 637: Performed by: HOSPITALIST

## 2024-08-09 PROCEDURE — 81003 URINALYSIS AUTO W/O SCOPE: CPT | Performed by: INTERNAL MEDICINE

## 2024-08-09 PROCEDURE — 36415 COLL VENOUS BLD VENIPUNCTURE: CPT | Performed by: INTERNAL MEDICINE

## 2024-08-09 PROCEDURE — 84100 ASSAY OF PHOSPHORUS: CPT | Performed by: INTERNAL MEDICINE

## 2024-08-09 PROCEDURE — 97535 SELF CARE MNGMENT TRAINING: CPT | Mod: GO

## 2024-08-09 PROCEDURE — 250N000013 HC RX MED GY IP 250 OP 250 PS 637: Performed by: INTERNAL MEDICINE

## 2024-08-09 RX ORDER — QUETIAPINE FUMARATE 25 MG/1
25 TABLET, FILM COATED ORAL AT BEDTIME
Status: DISCONTINUED | OUTPATIENT
Start: 2024-08-09 | End: 2024-08-10

## 2024-08-09 RX ADMIN — INSULIN ASPART 2 UNITS: 100 INJECTION, SOLUTION INTRAVENOUS; SUBCUTANEOUS at 11:53

## 2024-08-09 RX ADMIN — QUETIAPINE FUMARATE 25 MG: 25 TABLET ORAL at 21:53

## 2024-08-09 RX ADMIN — METOPROLOL TARTRATE 25 MG: 25 TABLET, FILM COATED ORAL at 09:25

## 2024-08-09 RX ADMIN — METOPROLOL TARTRATE 25 MG: 25 TABLET, FILM COATED ORAL at 20:21

## 2024-08-09 RX ADMIN — PANTOPRAZOLE SODIUM 40 MG: 40 TABLET, DELAYED RELEASE ORAL at 09:24

## 2024-08-09 RX ADMIN — Medication 1 MG: at 20:21

## 2024-08-09 RX ADMIN — ATORVASTATIN CALCIUM 20 MG: 20 TABLET, FILM COATED ORAL at 09:25

## 2024-08-09 ASSESSMENT — ACTIVITIES OF DAILY LIVING (ADL)
ADLS_ACUITY_SCORE: 49
ADLS_ACUITY_SCORE: 50
ADLS_ACUITY_SCORE: 50
ADLS_ACUITY_SCORE: 49
ADLS_ACUITY_SCORE: 50
ADLS_ACUITY_SCORE: 49
ADLS_ACUITY_SCORE: 50
ADLS_ACUITY_SCORE: 49
ADLS_ACUITY_SCORE: 50
ADLS_ACUITY_SCORE: 49
ADLS_ACUITY_SCORE: 50
ADLS_ACUITY_SCORE: 49
ADLS_ACUITY_SCORE: 50

## 2024-08-09 NOTE — PROGRESS NOTES
[facility]  Neurology Daily Note      Admission Date:8/2/2024   Date of service: 08/09/2024   Hospital Day: 8                                                   Assessment and Plan:   #Encephalopathy, delirium   # Suspect underlying dementia  Aseptic meningitis was found on LP, no other source of infection and patient has had improvement of headaches with no further fevers.   I suspect his current confusion is most likely from his initial infection and then hospital with poor sleep, contributing delirium. MRI showed mild atrophy which can also delay recovery from infectious and metabolic abnormalities.  On initial exam patient did have increased muscle tone as well as tremors appearing at rest and bradykinesia  This is improved since stopping Seroquel but still mildly present. This should be worked up in the outpatient setting, but those who have a susceptibility for parkinsonism side effects of antipsychotics as well as susceptibility for delirium may be the first presentation of Lewy body dementia.  Again, this should be an outpatient follow-up with neurology and neuropsychiatry for assessment.  His prior neuropsych testing in 2013 did reveal anomia that was mild as well as some difficulty with complex attentional processing.  I suspect these deficits may have worsened in that time frame and may be heightened in the setting of infection. EEG was negative for any seizures or epileptic sharps.   -Continue holding Seroquel as patient seems to be susceptible to this, he has had improvement in his motor exam.  He continues have mild cogwheeling and bradykinesia as well as a tremor, but better than the day prior, so it is possible that his underlying initial aseptic meningitis has revealed a underlying degenerative disorder that should be evaluated and treated in the outpatient setting.   -Patient's with underlying degenerative disorders are more susceptible to delirium.  Delirium precautions as below.    The  following are useful supportive measures of delirium management [[place] Med 32:257-263, 2000]  - limit psychoactive medications (i.e. benzodiazepines, opiates, anticholinergics)  - frequent reorientation  - optimize interpersonal contact/communication: reorientation, simple instructions, frequent eye contact  - reduce sensory deficits with any hearing aids or glasses  - encourage independence and mobility as appropriate  - avoid restraints as able  - reduce room/staff changes  - quiet environment with a restful nocturnal room environment  - reinforcement of sleep/wake cycle - lights on, shades open during the day    - provide and update orientation board each shift with names of care team members and daily schedule  - frequent re-orientation with encouragement of visitors  - keep blinds open, lights and TV on during the day, keep patient active when possible  - sleep protocol (warm drink, relaxation tapes, massage, avoid meds/interruptions during sleep, noise reduction)  - limit deliriogenic meds (anti-cholinergics, excessive opiates, and benzodiadepines)  - get up in chair as much as possible as tolerated by behaviors  - engage patient in cognitively stimulating activities (current events, reminiscence activities, word games, art activities)  - ambulation, active range of motion exercises  - provide or repair visual or hearing aids with encouragement for daily use  - treat pain effectively  -ensure constipation is avoided, recommend schedule bowel regimen  -maintain adequate hydration within fluid recommendations as per primary team  - explain to patient anything that is being done to him/her prior to attempting    -MRI brain without contrast showed mild atrophy but no significant findings outside of that.  -EEG negative for seizures and epileptic sharps, showed moderate slowing which can be seen with toxic-metabolic encephalopathy   -With his frequent urination last night recommend evaluation of UA as underlying  infection could contribute to continued confusion.  -Recommend outpatient neurology follow-up and neuropsychiatry referral    General Neurology service will sign off as no additional neurologic evaluation or management from our service is required at this time.  Please contact General Neurology service if questions related to neurologic status or follow up needed during this hospitalization.          Interval History:   Patient EEG yesterday showed moderate slowing that can be seen in toxic metabolic encephalopathy as well as cerebral dysfunction such as dementia.  Denies headache and dizziness.  Overnight he was restless with disorganized thinking.  He reports wanting to walk up more is now up with standby assist.  He was frequently urinating last night and so did not get much sleep.         Medications:   Scheduled Meds:  Current Facility-Administered Medications   Medication Dose Route Frequency Provider Last Rate Last Admin    atorvastatin (LIPITOR) tablet 20 mg  20 mg Oral Daily Ana Luisa Campos MD   20 mg at 08/08/24 0907    insulin aspart (NovoLOG) injection (RAPID ACTING)   Subcutaneous TID w/meals Oliveira, Lucio Gonzalez MD   4 Units at 08/08/24 1905    insulin aspart (NovoLOG) injection (RAPID ACTING)  1-7 Units Subcutaneous TID AC Oliveira, Lucio Gonzalez MD   1 Units at 08/08/24 1824    insulin aspart (NovoLOG) injection (RAPID ACTING)  1-5 Units Subcutaneous At Bedtime Oliveira, Lucio Gonzalez MD   2 Units at 08/04/24 2315    metoprolol tartrate (LOPRESSOR) tablet 25 mg  25 mg Oral BID Ana Luisa Campos MD   25 mg at 08/08/24 2031    pantoprazole (PROTONIX) EC tablet 40 mg  [facility]   40 mg at 08/08/24 0907    [Held by provider] QUEtiapine (SEROquel) tablet 25 mg  25 mg Oral At Bedtime Bienvenido Loya MD   25 mg at 08/06/24 2129     PRN Meds:   Current Facility-Administered Medications   Medication Dose Route Frequency Provider Last Rate Last Admin    acetaminophen (TYLENOL) tablet 650  mg  650 mg Oral Q4H PRN Oliveira, Lucio Gonzalez MD   650 mg at 08/03/24 1409    Or    acetaminophen (TYLENOL) Suppository 650 mg  650 mg Rectal Q4H PRN Oliveira, Lucio Gonzalez MD        bisacodyl (DULCOLAX) suppository 10 mg  10 mg Rectal Daily PRN Oliveira, Lucio Gonzalez MD        calcium carbonate (TUMS) chewable tablet 1,000 mg  1,000 mg Oral 4x Daily PRN Oliveira, Lucio Gonzalez MD        glucose gel 15-30 g  15-30 g Oral Q15 Min PRN Oliveira, Lucio Gonzalez MD        Or    dextrose 50 % injection 25-50 mL  25-50 mL Intravenous Q15 Min PRN Oliveira, Lucio Gonzalez MD        Or    glucagon injection 1 mg  1 mg Subcutaneous Q15 Min PRN Oliveira, Lucio Gonzalez MD        melatonin tablet 1 mg  1 mg Oral At Bedtime PRN Olvieira, Lucio Gonzalez MD   1 mg at 08/03/24 0155    naloxone (NARCAN) injection 0.2 mg  0.2 mg Intravenous Q2 Min PRN Oliveira, Lucio Gonzalez MD        Or    naloxone (NARCAN) injection 0.4 mg  0.4 mg Intravenous Q2 Min PRN Oliveira, Lucio Gonzalez MD        Or    naloxone (NARCAN) injection 0.2 mg  0.2 mg Intramuscular Q2 Min PRN Oliveira, Lucio Gonzalez MD        Or    naloxone (NARCAN) injection 0.4 mg  0.4 mg Intramuscular Q2 Min PRN Oliveira, Lucio Gonzalez MD        ondansetron (ZOFRAN ODT) ODT tab 4 mg  4 mg Oral Q6H PRN Oliveira, Lucio Gonzalez MD        Or    ondansetron (ZOFRAN) injection 4 mg  4 mg Intravenous Q6H PRN Oliveira, Lucio Gonzalez MD        polyethylene glycol (MIRALAX) Packet 17 g  17 g Oral BID PRN Oliveira, Lucio Gonzalez MD        prochlorperazine (COMPAZINE) injection 5 mg  5 mg Intravenous Q6H PRN Oliveira, Lucio Gonzalez MD        Or    prochlorperazine (COMPAZINE) tablet 5 mg  5 mg Oral Q6H PRN Oliveira, Lucio Gonzalez MD        Or    prochlorperazine (COMPAZINE) suppository 12.5 mg  12.5 mg Rectal Q12H PRN Oliveira, Lucio Gonzalez MD        [Held by provider] QUEtiapine (SEROquel) quarter-tab 6.25 mg  6.25 mg Oral BID PRN Bienvenido Loya MD        senna-docusate (SENOKOT-S/PERICOLACE) 8.6-50 MG per tablet 1 tablet  1 tablet Oral BID PRN Lucio Oliveira MD         Or    senna-docusate (SENOKOT-S/PERICOLACE) 8.6-50 MG per tablet 2 tablet  2 tablet Oral BID PRN Oliveira, Lucio Gonzalez MD               Physical Exam:   Vitals: Temp: 97.9  F (36.6  C) Temp src: Axillary BP: (!) 168/98 Pulse: 74   Resp: 16 SpO2: 95 % O2 Device: None (Room air)    Vital Signs with Ranges: Temp:  [97.7  F (36.5  C)-99.2  F (37.3  C)] 97.9  F (36.6  C)  Pulse:  [67-87] 74  Resp:  [16-18] 16  BP: (132-168)/(81-98) 168/98  SpO2:  [95 %-98 %] 95 %    General Appearance:  No acute distress  Neuro:           Mental Status Exam: Alert and oriented to month, hospital, president.  Patient was able to get his birthdate correct but stated the year was 1922.  Able to follow simple commands but continues to perseverate on testing.  Unable to do cross body commands as he did was yesterday.  Positive frontal release sign and palmomental reflex.   Cranial Nerves: Extraocular movements intact, but saccadic pursuits noted in the horizontal fields.  Facial strength and sensation is normal. No jaw or tongue deviation.  Motor: Mild increased tone in the bilateral upper extremities with some cogwheeling but improved compared to the day prior.  No significant tremor of the bilateral hands noted with positional testing, very mildly noted at rest.  Bradykinesia with finger tapping bilaterally.  Coordination: Coordination intact to finger-nose-finger.  Gait: Not tested today but previously was wide-based gait with short strides and shuffling with reduced arm swing.  No appreciated tremor with walking.  Requires a gait belt with 1 assist.  Neck: No nuchal rigidity  Extremities: No clubbing, no cyanosis, no edema        Data:   ROUTINE IP LABS (Last 3results)  CBC RESULTS:     Recent Labs   Lab Test 08/06/24  0442 08/05/24  0712 08/04/24  0547   WBC 9.4 11.9* 13.2*   RBC 3.77* 3.57* 3.65*   HGB 11.4* 10.7* 11.0*   HCT 34.9* 32.9* 33.2*    136* 123*     Basic Metabolic Panel:  Recent Labs   Lab Test 08/09/24  0585  08/09/24  0132 08/08/24  1823 08/08/24  1253 08/08/24  0905 08/08/24  0450 08/07/24  0557 08/07/24  0532 08/06/24  0731 08/06/24  0442 08/05/24  1322 08/05/24  0712 08/04/24  0907 08/04/24  0547   NA  --   --   --   --   --   --   --   --   --  135  --  134*  --  137   POTASSIUM 4.1  --   --   --   --  4.0  --  3.8  --  3.7  --  3.6  --  3.9   CHLORIDE  --   --   --   --   --   --   --   --   --  97*  --  100  --  104   CO2  --   --   --   --   --   --   --   --   --  30*  --  24  --  24   BUN  --   --   --   --   --   --   --   --   --  15.6  --  23.8*  --  34.4*   CR  --   --   --   --   --   --   --   --   --  0.97  --  1.12  --  1.26*   GLC  --  199* 149* 270*   < >  --    < >  --    < > 176*   < > 177*   < > 278*   MELBA  --   --   --   --   --   --   --   --   --  7.8*  --  7.7*  --  8.1*    < > = values in this interval not displayed.     Liver panel:  Recent Labs   Lab Test 08/04/24  0547 08/03/24  0603 08/02/24  2019 10/28/17  0020   PROTTOTAL 6.3* 7.0 7.4 7.5   ALBUMIN 3.6 4.0 4.4 3.7   BILITOTAL 0.4 1.0 1.3* 0.5   ALKPHOS 77 85 91 86   AST 54* 48* 45 33   ALT 56 50 57 50     Thyroid Panel:  Recent Labs   Lab Test 08/03/24  0955 10/28/17  1222 10/28/17  0020   TSH 0.53 0.85 1.07      Vitamin B12: No lab results found.   Ammonia: No lab results found.     IMAGING:   Independent interpretation of the following studies by myself as part of today's encounter.   MRI brain wo con  FINDINGS:  INTRACRANIAL CONTENTS: No acute or subacute infarct. No mass, acute hemorrhage, or extra-axial fluid collections. Patchy nonspecific T2/FLAIR hyperintensities within the cerebral white matter most consistent with mild to moderate chronic microvascular   ischemic change. Mild generalized cerebral atrophy. No hydrocephalus. Normal position of the cerebellar tonsils.      SELLA: No abnormality accounting for technique.     OSSEOUS STRUCTURES/SOFT TISSUES: Normal marrow signal. The major intracranial vascular flow voids are  maintained.      ORBITS: No abnormality accounting for technique.      SINUSES/MASTOIDS: Mild mucosal thickening scattered about the paranasal sinuses. No middle ear or mastoid effusion.                                                                       IMPRESSION:  1.  No acute intracranial process.  2.  Generalized brain atrophy and presumed microvascular ischemic changes as detailed above.      EEG 8/8/24  IMPRESSION:      This electroencephalogram  is abnormal revealing moderate slowing and reduced amplitude of the background.  Patient is clinically noted to be drowsy or asleep through most of the recording.  No epileptic abnormality is noted.  This finding is consistent with toxic metabolic abnormality or other disturbance of generalized cerebral function.      TIME     43 minutes Evaluation/management time     Mee Ray M.D.  Neurologist  [facility]  Office [phone]

## 2024-08-09 NOTE — PROGRESS NOTES
Care Management Follow Up    Length of Stay (days): 7    Expected Discharge Date: 08/09/2024     Concerns to be Addressed: discharge planning     Patient plan of care discussed at interdisciplinary rounds: Yes    Anticipated Discharge Disposition: Transitional Care     Anticipated Discharge Services: None  Anticipated Discharge DME: None    Patient/family educated on Medicare website which has current facility and service quality ratings: no  Education Provided on the Discharge Plan: Yes  Patient/Family in Agreement with the Plan: yes    Referrals Placed by CM/SW: Post Acute Facilities  Private pay costs discussed: Not applicable    Additional Information:  Pt accepted to [facility]. The facility can accept this weekend, if ready. Call Catrachita at [phone]. It is a private room at no additional fee. STEPHANIE updated pt's daughter, Nela. She is in agreement. Reviewed Medicare coverage. SW following for discharge planning.    PRIYA Enriquez, LICSW  [phone] Desk phone  [phone] Cell/text (Preferred)  [facility]

## 2024-08-09 NOTE — PROGRESS NOTES
CLINICAL NUTRITION SERVICES BRIEF NOTE  Chart check for LOS.   - Pt with good intakes most of admission, usually eating % of meals. Regular diet, no restrictions.   - No recent wt loss noted or recorded in EMR.   Wt Readings from Last 10 Encounters:   08/09/24 72.8 kg (160 lb 6.4 oz)   11/14/17 81.5 kg (179 lb 11.2 oz)   10/28/17 74.8 kg (164 lb 14.5 oz)   08/29/16 90.7 kg (200 lb)   02/09/16 96 kg (211 lb 9.6 oz)   05/16/15 83.9 kg (185 lb)   02/19/13 83.9 kg (185 lb)   - SKIN: blanchable redness to coccyx, some bruising, no wounds documented.     - No plan for full nutrition assessment, education, or intervention.   - Please consult should needs arise .    Lyn Cain RD, LD  Pager: [phone]

## 2024-08-09 NOTE — PLAN OF CARE
Goal Outcome Evaluation:    Orientations: alert & orientated to self. Confused and restless. Illogical and disorganized thinking.   Vitals/Pain: Vitals stable on RA, ex high BP.  Denies pain, headache, dizziness.   Tele: n/a  Lungs: Clear, diminished.  Diet: regular, tolerating well.   Lines/Drains: R PIV, SL  Skin/Wounds: Blanchable redness on coccyx/sacrum, mepilex in place.  Bruising on R and L shin.   GI/: active BS, had BM. Adequately voiding to the bathroom. Urinary frequency noted.   Labs: Abnormal/Trends, Electrolyte Replacement- On K/Mg/ Phos protocols. BG- ACHS.  Ambulation/Assist: Up with SBA.   Sleep Quality: fair.  Plan: Ensure pt.safety, reorientation.

## 2024-08-09 NOTE — PLAN OF CARE
Here for delirium, sepsis. A&O x3 and intermittent confusion. Neuros intact. VSS on RA. Up w/ SBA. Denied pain. Voiding in BR frequently. UA sent. Tolerating diet. Takes pills whole. Alarms on. Plan for discharge to TCU tomorrow.

## 2024-08-09 NOTE — PROGRESS NOTES
[facility] [place]  Hospitalist Progress Note  Bienvenido Loya MD  08/09/2024    Assessment & Plan   This is a 74-year-old male with medical history which includes depression, OCD, diabetes mellitus on metformin who was brought to the hospital on 8/3/2024 by family with concerns of lethargy and confusion and admitted to the hospital with sepsis and encephalopathy           Likely viral meningitis  Severe sepsis-resolved  -On admission presented with fever, confusion  -Initial lab work on admit showed sodium of 129, chloride of 91, creatinine of 1.45 with GFR of 51, total bilirubin of 1.3, CK3 2 3, glucose 163 and ALT AST and alkaline phosphatase normal, lactic acid of 1.4  - UA on admit was negative  - COVID-19, influenza and RSV negative  - CT head 8/2-normal  - Chest x-ray 8/3-no evidence of any pneumonia  - Blood cultures 8/2-negative   - Tick panel negative  - LP on admit-glucose of 79, protein elevated at 56.3, total nucleated cells 195, RBC 25  - Meningitis panel negative  - CSF aerobic bacterial culture-negative  - West Nile PCR Not detected  - On admit patient was started on broad-spectrum antibiotics and ID was consulted  - Antibiotic changed during the course of hospital stay along with steroids  - on exam does not seem to have any neck rigidity or stiffness and his headaches have significantly improved  - Patient seen by infectious disease team and antibiotics have been stopped and no further infectious disease follow-up needed        Hyponatremia-improved and hypochloremia-resolved  -Sodium on admit was 129 with chloride of 91  -Unclear if patient was given IV fluids but labs done on 8/3 AM showed serum osmolality of 290, urine sodium of 37 and urine osmolality of 370 with TSH of 0.53  -Patient was started on IV fluids and his sodium is improved to 134 with chloride of 100 and likely due to dehydration    Metabolic encephalopathy secondary to aseptic meningitis  Delirium  Patient  with altering periods of confusion and clarity and sleeping and noted poor sleep overnight.  - discontinued benzo and narcotics  - neurology consult noted, increase tone and tremors so further seroquel held   - suspected prolonged encephalopathy in context of CNS infection, atrophy with prolonged recover phase  - MRI shows no stroke EEG shows encephalopathy but no seizure activity  - patient prior to admission on seroquel 50 mg BID, after discussing with neurology, will restart at 25 mg at bedtime and may need to uptitrate.     Elevated creatinine on CKD stage III  -Baseline creatinine is normal at 1.04-1.28  -Creatinine on admit was 1.45 and likely due to dehydration due to underlying sepsis and was given IV fluids and creatinine has normalized to baseline to 1.     Chronic anemia  Acute thrombocytopenia likely due to underlying sepsis  -Baseline hemoglobin in EMR-10.5-11.8  -Hemoglobin on admit was 11.8 with platelet count of 138  -Hemoglobin-11.8-10.7  -Platelet ovadx-[phone]  -Will monitor closely     Diabetes mellitus  -PTA regimen includes metformin  -Continue with sliding scale insulin     History of OCD and depression  History of ECT treatment in 2017  -PTA regimen: Seroquel 50 twice daily, Ativan 0.5 mg 3 times daily as needed  -Psychiatry team consulted     Hypertension  Hyperlipidemia  -PTA regimen as per pharmacology: Amlodipine 5 mg twice daily, aspirin 81 mg, atorvastatin 20 mg daily, lisinopril 20 mg daily, metoprolol 25 twice daily  -Blood pressure is stable and will introduce atorvastatin and metoprolol and further medications can be reintroduced on 8/6 AM pending blood pressure levels     GERD  -PTA regimen includes 20 mg omeprazole     Physical deconditioning  -Seen by therapy teams and they have recommended TCU     Family communication   - I did speak with adrianne [phone] and she was updated      Diet: Combination Diet Regular Diet Adult    DVT Prophylaxis: Pneumatic Compression  "Devices  Spencer Catheter: Not present  Lines: None     Cardiac Monitoring: ACTIVE order. Indication: Tachyarrhythmias, acute (48 hours)  Code Status: Full Code          Clinically Significant Risk Factors          # Hypocalcemia: Lowest Ca = 7.7 mg/dL in last 2 days, will monitor and replace as appropriate       # Thrombocytopenia: Lowest platelets = 123 in last 2 days, will monitor for bleeding   # Hypertension: Noted on problem list             # DMII: A1C = 7.3 % (Ref range: <5.7 %) within past 6 months, PRESENT ON ADMISSION  # Overweight: Estimated body mass index is 25.73 kg/m  as calculated from the following:    Height as of this encounter: 1.74 m (5' 8.5\").    Weight as of this encounter: 77.9 kg (171 lb 11.8 oz)., PRESENT ON ADMISSION         Disposition Plan  -- anticipate to TCU     Medically Ready for Discharge: on  8/10 - 8/12 to TCU    Disposition:     Interval History   -- restless last night but sleeping on and off today  -- discussed with RN and updated daughters  -- discussed with neurology    -Data reviewed today: I reviewed all new labs and imaging over the last 24 hours. I personally reviewed no images or EKG's today.    Physical Exam    , Blood pressure (!) 145/96, pulse 70, temperature 98.6  F (37  C), temperature source Oral, resp. rate 16, height 1.74 m (5' 8.5\"), weight 72.8 kg (160 lb 6.4 oz), SpO2 99%.  Vitals:    08/07/24 0602 08/07/24 2354 08/09/24 0137   Weight: 72.7 kg (160 lb 4.8 oz) 73.3 kg (161 lb 11.2 oz) 72.8 kg (160 lb 6.4 oz)     Vital Signs with Ranges  Temp:  [97.7  F (36.5  C)-99.2  F (37.3  C)] 98.6  F (37  C)  Pulse:  [61-87] 70  Resp:  [16-18] 16  BP: (145-168)/() 145/96  SpO2:  [95 %-99 %] 99 %  I/O's Last 24 hours  I/O last 3 completed shifts:  In: 960 [P.O.:960]  Out: -     Constitutional:  Arouses, much less confused, able to put a sentence together  Respiratory: Clear to auscultation bilaterally, no crackles or wheezing  Cardiovascular: Regular rate and rhythm, " normal S1 and S2, and no murmur noted  GI: Normal bowel sounds, soft, non-distended, non-tender  Skin/Integumen: No rashes, no cyanosis, no edema  Other:      Medications   All medications were reviewed.  Current Facility-Administered Medications   Medication Dose Route Frequency Provider Last Rate Last Admin     Current Facility-Administered Medications   Medication Dose Route Frequency Provider Last Rate Last Admin    atorvastatin (LIPITOR) tablet 20 mg  20 mg Oral Daily Ana Luisa Campos MD   20 mg at 08/09/24 0925    insulin aspart (NovoLOG) injection (RAPID ACTING)   Subcutaneous TID w/meals Oliveira, Lucio Gonzalez MD   4 Units at 08/09/24 1756    insulin aspart (NovoLOG) injection (RAPID ACTING)  1-7 Units Subcutaneous TID AC Oliveira, Lucio Gonzalez MD   2 Units at 08/09/24 1153    insulin aspart (NovoLOG) injection (RAPID ACTING)  1-5 Units Subcutaneous At Bedtime Oliveira, Lucio Gonzalez MD   2 Units at 08/04/24 2315    metoprolol tartrate (LOPRESSOR) tablet 25 mg  25 mg Oral BID Ana Luisa Campos MD   25 mg at 08/09/24 0925    pantoprazole (PROTONIX) EC tablet 40 mg  [facility]   40 mg at 08/09/24 0924    QUEtiapine (SEROquel) tablet 25 mg  25 mg Oral At Bedtime Bienvenido Loya MD            Data   Recent Labs   Lab 08/09/24  1655 08/09/24  1103 08/09/24  0754 08/09/24  0543 08/08/24  0905 08/08/24  0450 08/07/24  0557 08/07/24  0532 08/06/24  0731 08/06/24  0442 08/05/24  1322 08/05/24  0712 08/04/24  0907 08/04/24  0547 08/03/24  0830 08/03/24  0603 08/03/24  0344 08/02/24 2020   WBC  --   --   --   --   --   --   --   --   --  9.4  --  11.9*  --  13.2*  --  9.1  --   --    HGB  --   --   --   --   --   --   --   --   --  11.4*  --  10.7*  --  11.0*  --  11.5*  --   --    MCV  --   --   --   --   --   --   --   --   --  93  --  92  --  91  --  89  --   --    PLT  --   --   --   --   --   --   --   --   --  159  --  136*  --  123*  --  120*  --   --    INR  --   --   --   --   --   --   --    --   --   --   --   --   --   --   --  1.15  --  1.12   NA  --   --   --   --   --   --   --   --   --  135  --  134*  --  137  --  132*  --   --    POTASSIUM  --   --   --  4.1  --  4.0  --  3.8  --  3.7  --  3.6  --  3.9  --  4.1  --   --    CHLORIDE  --   --   --   --   --   --   --   --   --  97*  --  100  --  104  --  97*  --   --    CO2  --   --   --   --   --   --   --   --   --  30*  --  24  --  24  --  22  --   --    BUN  --   --   --   --   --   --   --   --   --  15.6  --  23.8*  --  34.4*  --  23.9*  --   --    CR  --   --   --   --   --   --   --   --   --  0.97  --  1.12  --  1.26*  --  1.29*  --   --    ANIONGAP  --   --   --   --   --   --   --   --   --  8  --  10  --  9  --  13  --   --    MELBA  --   --   --   --   --   --   --   --   --  7.8*  --  7.7*  --  8.1*  --  8.0*  --   --    * 231* 137*  --    < >  --    < >  --    < > 176*   < > 177*   < > 278*   < > 226*   < >  --    ALBUMIN  --   --   --   --   --   --   --   --   --   --   --   --   --  3.6  --  4.0  --   --    PROTTOTAL  --   --   --   --   --   --   --   --   --   --   --   --   --  6.3*  --  7.0  --   --    BILITOTAL  --   --   --   --   --   --   --   --   --   --   --   --   --  0.4  --  1.0  --   --    ALKPHOS  --   --   --   --   --   --   --   --   --   --   --   --   --  77  --  85  --   --    ALT  --   --   --   --   --   --   --   --   --   --   --   --   --  56  --  50  --   --    AST  --   --   --   --   --   --   --   --   --   --   --   --   --  54*  --  48*  --   --     < > = values in this interval not displayed.       No results found for this or any previous visit (from the past 24 hour(s)).      Bienvenido Loya MD  Text Page  (7am to 6pm)

## 2024-08-10 LAB
ANION GAP SERPL CALCULATED.3IONS-SCNC: 9 MMOL/L (ref 7–15)
BUN SERPL-MCNC: 21.2 MG/DL (ref 8–23)
CALCIUM SERPL-MCNC: 9.4 MG/DL (ref 8.8–10.4)
CHLORIDE SERPL-SCNC: 98 MMOL/L (ref 98–107)
CREAT SERPL-MCNC: 1.01 MG/DL (ref 0.67–1.17)
EGFRCR SERPLBLD CKD-EPI 2021: 79 ML/MIN/1.73M2
ERYTHROCYTE [DISTWIDTH] IN BLOOD BY AUTOMATED COUNT: 14 % (ref 10–15)
GLUCOSE BLDC GLUCOMTR-MCNC: 133 MG/DL (ref 70–99)
GLUCOSE BLDC GLUCOMTR-MCNC: 156 MG/DL (ref 70–99)
GLUCOSE BLDC GLUCOMTR-MCNC: 176 MG/DL (ref 70–99)
GLUCOSE BLDC GLUCOMTR-MCNC: 185 MG/DL (ref 70–99)
GLUCOSE BLDC GLUCOMTR-MCNC: 188 MG/DL (ref 70–99)
GLUCOSE BLDC GLUCOMTR-MCNC: 281 MG/DL (ref 70–99)
GLUCOSE SERPL-MCNC: 137 MG/DL (ref 70–99)
HCO3 SERPL-SCNC: 31 MMOL/L (ref 22–29)
HCT VFR BLD AUTO: 40.6 % (ref 40–53)
HGB BLD-MCNC: 12.9 G/DL (ref 13.3–17.7)
MAGNESIUM SERPL-MCNC: 2 MG/DL (ref 1.7–2.3)
MCH RBC QN AUTO: 29.4 PG (ref 26.5–33)
MCHC RBC AUTO-ENTMCNC: 31.8 G/DL (ref 31.5–36.5)
MCV RBC AUTO: 93 FL (ref 78–100)
PHOSPHATE SERPL-MCNC: 3.6 MG/DL (ref 2.5–4.5)
PLATELET # BLD AUTO: 224 10E3/UL (ref 150–450)
POTASSIUM SERPL-SCNC: 4.1 MMOL/L (ref 3.4–5.3)
RBC # BLD AUTO: 4.39 10E6/UL (ref 4.4–5.9)
SODIUM SERPL-SCNC: 138 MMOL/L (ref 135–145)
WBC # BLD AUTO: 9 10E3/UL (ref 4–11)

## 2024-08-10 PROCEDURE — 250N000013 HC RX MED GY IP 250 OP 250 PS 637: Performed by: HOSPITALIST

## 2024-08-10 PROCEDURE — 36415 COLL VENOUS BLD VENIPUNCTURE: CPT | Performed by: INTERNAL MEDICINE

## 2024-08-10 PROCEDURE — 80048 BASIC METABOLIC PNL TOTAL CA: CPT | Performed by: INTERNAL MEDICINE

## 2024-08-10 PROCEDURE — 83735 ASSAY OF MAGNESIUM: CPT | Performed by: INTERNAL MEDICINE

## 2024-08-10 PROCEDURE — 85027 COMPLETE CBC AUTOMATED: CPT | Performed by: INTERNAL MEDICINE

## 2024-08-10 PROCEDURE — 250N000013 HC RX MED GY IP 250 OP 250 PS 637

## 2024-08-10 PROCEDURE — 250N000013 HC RX MED GY IP 250 OP 250 PS 637: Performed by: INTERNAL MEDICINE

## 2024-08-10 PROCEDURE — 84100 ASSAY OF PHOSPHORUS: CPT | Performed by: INTERNAL MEDICINE

## 2024-08-10 PROCEDURE — 99239 HOSP IP/OBS DSCHRG MGMT >30: CPT | Performed by: INTERNAL MEDICINE

## 2024-08-10 PROCEDURE — 120N000001 HC R&B MED SURG/OB

## 2024-08-10 RX ORDER — QUETIAPINE FUMARATE 25 MG/1
50 TABLET, FILM COATED ORAL AT BEDTIME
Status: DISCONTINUED | OUTPATIENT
Start: 2024-08-10 | End: 2024-08-11 | Stop reason: HOSPADM

## 2024-08-10 RX ORDER — ASPIRIN 81 MG/1
81 TABLET ORAL DAILY
Status: DISCONTINUED | OUTPATIENT
Start: 2024-08-10 | End: 2024-08-11 | Stop reason: HOSPADM

## 2024-08-10 RX ORDER — LISINOPRIL 20 MG/1
20 TABLET ORAL DAILY
Status: DISCONTINUED | OUTPATIENT
Start: 2024-08-10 | End: 2024-08-11 | Stop reason: HOSPADM

## 2024-08-10 RX ORDER — AMLODIPINE BESYLATE 5 MG/1
5 TABLET ORAL 2 TIMES DAILY
Status: DISCONTINUED | OUTPATIENT
Start: 2024-08-10 | End: 2024-08-11 | Stop reason: HOSPADM

## 2024-08-10 RX ADMIN — ASPIRIN 81 MG: 81 TABLET, COATED ORAL at 15:53

## 2024-08-10 RX ADMIN — PANTOPRAZOLE SODIUM 40 MG: 40 TABLET, DELAYED RELEASE ORAL at 06:48

## 2024-08-10 RX ADMIN — INSULIN ASPART 1 UNITS: 100 INJECTION, SOLUTION INTRAVENOUS; SUBCUTANEOUS at 18:46

## 2024-08-10 RX ADMIN — METOPROLOL TARTRATE 25 MG: 25 TABLET, FILM COATED ORAL at 08:26

## 2024-08-10 RX ADMIN — QUETIAPINE FUMARATE 50 MG: 25 TABLET ORAL at 21:02

## 2024-08-10 RX ADMIN — METOPROLOL TARTRATE 25 MG: 25 TABLET, FILM COATED ORAL at 21:02

## 2024-08-10 RX ADMIN — AMLODIPINE BESYLATE 5 MG: 5 TABLET ORAL at 21:02

## 2024-08-10 RX ADMIN — LISINOPRIL 20 MG: 20 TABLET ORAL at 15:53

## 2024-08-10 RX ADMIN — INSULIN ASPART 1 UNITS: 100 INJECTION, SOLUTION INTRAVENOUS; SUBCUTANEOUS at 15:07

## 2024-08-10 RX ADMIN — ATORVASTATIN CALCIUM 20 MG: 20 TABLET, FILM COATED ORAL at 08:26

## 2024-08-10 ASSESSMENT — ACTIVITIES OF DAILY LIVING (ADL)
ADLS_ACUITY_SCORE: 49

## 2024-08-10 NOTE — DISCHARGE SUMMARY
[facility]  Hospitalist Discharge Summary      Date of Admission:  8/2/2024  Date of Discharge:  8/10/2024  Discharging Provider: Bienvenido Loya MD  Discharge Service: Hospitalist Service    Discharge Diagnoses   This is a 74-year-old male with medical history which includes depression, OCD, diabetes mellitus on metformin who was brought to the hospital on 8/3/2024 by family with concerns of lethargy and confusion and admitted to the hospital with sepsis and encephalopathy    Likely viral meningitis  Severe sepsis-resolved  -On admission presented with fever, confusion  -Initial lab work on admit showed sodium of 129, chloride of 91, creatinine of 1.45 with GFR of 51, total bilirubin of 1.3, CK3 2 3, glucose 163 and ALT AST and alkaline phosphatase normal, lactic acid of 1.4  - UA on admit was negative  - COVID-19, influenza and RSV negative  - CT head 8/2-normal  - Chest x-ray 8/3-no evidence of any pneumonia  - Blood cultures 8/2-negative   - Tick panel negative  - LP on admit-glucose of 79, protein elevated at 56.3, total nucleated cells 195, RBC 25  - Meningitis panel negative  - CSF aerobic bacterial culture-negative  - West Nile PCR Not detected  - On admit patient was started on broad-spectrum antibiotics and ID was consulted  - Antibiotic changed during the course of hospital stay along with steroids  - on exam does not seem to have any neck rigidity or stiffness and his headaches have significantly improved  - Patient seen by infectious disease team and antibiotics have been stopped and no further infectious disease follow-up needed        Hyponatremia-improved and hypochloremia-resolved  -Sodium on admit was 129 with chloride of 91  -Unclear if patient was given IV fluids but labs done on 8/3 AM showed serum osmolality of 290, urine sodium of 37 and urine osmolality of 370 with TSH of 0.53  -Patient was started on IV fluids and his sodium is improved to 134 with chloride of  100 and likely due to dehydration     Metabolic encephalopathy secondary to aseptic meningitis  Delirium  Suspected underlying neurodegenerative disorder (Parkinson, etc)  Patient with altering periods of confusion and clarity and sleeping and noted poor sleep overnight.  - discontinued benzo and narcotics  - neurology consult noted, increase tone and tremors so further seroquel held   - suspected prolonged encephalopathy in context of CNS infection, atrophy with prolonged recover phase  - MRI shows no stroke EEG shows encephalopathy but no seizure activity  - patient prior to admission on seroquel 50 mg BID, after discussing with neurology, will restart at 25 mg at bedtime and may need to uptitrate to 50 mg BID at TCU  - follow up with neurology as outpatient.     Elevated creatinine on CKD stage III  -Baseline creatinine is normal at 1.04-1.28  -Creatinine on admit was 1.45 and likely due to dehydration due to underlying sepsis and was given IV fluids and creatinine has normalized to baseline to 1.     Chronic anemia  Acute thrombocytopenia likely due to underlying sepsis  -Baseline hemoglobin in EMR-10.5-11.8  -Hemoglobin on admit was 11.8 with platelet count of 138  -Hemoglobin-11.8-10.7  -Platelet [phone]  -Will monitor closely     Diabetes mellitus  -PTA regimen includes metformin  -Continue with sliding scale insulin     History of OCD and depression  History of ECT treatment in 2017  -PTA regimen: Seroquel 50 twice daily, Ativan 0.5 mg 3 times daily as needed  -Psychiatry team consulted     Hypertension  Hyperlipidemia  -PTA regimen as per pharmacology: Amlodipine 5 mg twice daily, aspirin 81 mg, atorvastatin 20 mg daily, lisinopril 20 mg daily, metoprolol 25 twice daily  -Blood pressure is stable and will introduce atorvastatin and metoprolol and further medications can be reintroduced on 8/6 AM pending blood pressure levels     GERD  -PTA regimen includes 20 mg omeprazole     Physical  "deconditioning  -Seen by therapy teams and they have recommended TCU     Family communication   - I did speak with adrianne [phone] and she was updated      Diet: Combination Diet Regular Diet Adult    DVT Prophylaxis: Pneumatic Compression Devices  Spencer Catheter: Not present  Lines: None     Cardiac Monitoring: ACTIVE order. Indication: Tachyarrhythmias, acute (48 hours)  Code Status: Full Code          Clinically Significant Risk Factors          # Hypocalcemia: Lowest Ca = 7.7 mg/dL in last 2 days, will monitor and replace as appropriate       # Thrombocytopenia: Lowest platelets = 123 in last 2 days, will monitor for bleeding   # Hypertension: Noted on problem list             # DMII: A1C = 7.3 % (Ref range: <5.7 %) within past 6 months, PRESENT ON ADMISSION  # Overweight: Estimated body mass index is 25.73 kg/m  as calculated from the following:    Height as of this encounter: 1.74 m (5' 8.5\").    Weight as of this encounter: 77.9 kg (171 lb 11.8 oz)., PRESENT ON ADMISSION          Disposition Plan  -- anticipate to TCU     Medically Ready for Discharge: on  8/10     Clinically Significant Risk Factors     # DMII: A1C = 7.3 % (Ref range: <5.7 %) within past 6 months       Follow-ups Needed After Discharge   Follow-up Appointments     Follow Up and recommended labs and tests      Follow up with  outpatient neurology for eval for Parkinsons or other   neurodegenerative disease in 2-3 months    Follow up with PCP after discharge from TCU        {Additional follow-up instructions/to-do's for PCP and outpatient neurology    Unresulted Labs Ordered in the Past 30 Days of this Admission       No orders found from 7/3/2024 to 8/3/2024.        These results will be followed up by PCP and outpatient neurology    Discharge Disposition   Discharged to rehabilitation facility  Condition at discharge: Stable    Hospital Course   This is a 74-year-old male with medical history which includes depression, OCD, diabetes " mellitus on metformin who was brought to the hospital on 8/3/2024 by family with concerns of lethargy and confusion and admitted to the hospital with sepsis and encephalopathy           Likely viral meningitis  Severe sepsis-resolved  -On admission presented with fever, confusion  -Initial lab work on admit showed sodium of 129, chloride of 91, creatinine of 1.45 with GFR of 51, total bilirubin of 1.3, CK3 2 3, glucose 163 and ALT AST and alkaline phosphatase normal, lactic acid of 1.4  - UA on admit was negative  - COVID-19, influenza and RSV negative  - CT head 8/2-normal  - Chest x-ray 8/3-no evidence of any pneumonia  - Blood cultures 8/2-negative   - Tick panel negative  - LP on admit-glucose of 79, protein elevated at 56.3, total nucleated cells 195, RBC 25  - Meningitis panel negative  - CSF aerobic bacterial culture-negative  - West Nile PCR Not detected  - On admit patient was started on broad-spectrum antibiotics and ID was consulted  - Antibiotic changed during the course of hospital stay along with steroids  - on exam does not seem to have any neck rigidity or stiffness and his headaches have significantly improved  - Patient seen by infectious disease team and antibiotics have been stopped and no further infectious disease follow-up needed        Hyponatremia-improved and hypochloremia-resolved  -Sodium on admit was 129 with chloride of 91  -Unclear if patient was given IV fluids but labs done on 8/3 AM showed serum osmolality of 290, urine sodium of 37 and urine osmolality of 370 with TSH of 0.53  -Patient was started on IV fluids and his sodium is improved to 134 with chloride of 100 and likely due to dehydration    Metabolic encephalopathy secondary to aseptic meningitis  Delirium  Patient with altering periods of confusion and clarity and sleeping and noted poor sleep overnight.  - discontinued benzo and narcotics  - neurology consult noted, increase tone and tremors so further seroquel held   -  suspected prolonged encephalopathy in context of CNS infection, atrophy with prolonged recover phase  - MRI shows no stroke EEG shows encephalopathy but no seizure activity  - patient prior to admission on seroquel 50 mg BID, after discussing with neurology, will restart at 25 mg at bedtime and may need to uptitrate.     Elevated creatinine on CKD stage III  -Baseline creatinine is normal at 1.04-1.28  -Creatinine on admit was 1.45 and likely due to dehydration due to underlying sepsis and was given IV fluids and creatinine has normalized to baseline to 1.     Chronic anemia  Acute thrombocytopenia likely due to underlying sepsis  -Baseline hemoglobin in EMR-10.5-11.8  -Hemoglobin on admit was 11.8 with platelet count of 138  -Hemoglobin-11.8-10.7  -Platelet [phone]  -Will monitor closely     Diabetes mellitus  -PTA regimen includes metformin  -Continue with sliding scale insulin     History of OCD and depression  History of ECT treatment in 2017  -PTA regimen: Seroquel 50 twice daily, Ativan 0.5 mg 3 times daily as needed  -Psychiatry team consulted     Hypertension  Hyperlipidemia  -PTA regimen as per pharmacology: Amlodipine 5 mg twice daily, aspirin 81 mg, atorvastatin 20 mg daily, lisinopril 20 mg daily, metoprolol 25 twice daily  -Blood pressure is stable and will introduce atorvastatin and metoprolol and further medications can be reintroduced on 8/6 AM pending blood pressure levels     GERD  -PTA regimen includes 20 mg omeprazole     Physical deconditioning  -Seen by therapy teams and they have recommended TCU     Family communication   - I did speak with adrianne [phone] and she was updated      Diet: Combination Diet Regular Diet Adult    DVT Prophylaxis: Pneumatic Compression Devices  Spencer Catheter: Not present  Lines: None     Cardiac Monitoring: ACTIVE order. Indication: Tachyarrhythmias, acute (48 hours)  Code Status: Full Code          Clinically Significant Risk Factors          #  "Hypocalcemia: Lowest Ca = 7.7 mg/dL in last 2 days, will monitor and replace as appropriate       # Thrombocytopenia: Lowest platelets = 123 in last 2 days, will monitor for bleeding   # Hypertension: Noted on problem list             # DMII: A1C = 7.3 % (Ref range: <5.7 %) within past 6 months, PRESENT ON ADMISSION  # Overweight: Estimated body mass index is 25.73 kg/m  as calculated from the following:    Height as of this encounter: 1.74 m (5' 8.5\").    Weight as of this encounter: 77.9 kg (171 lb 11.8 oz)., PRESENT ON ADMISSION         Disposition Plan  -- anticipate to TCU     Medically Ready for Discharge: on  8/10 - 8/12 to TCU    Disposition:     Consultations This Hospital Stay   INFECTIOUS DISEASES IP CONSULT  PSYCHIATRY IP CONSULT  OCCUPATIONAL THERAPY ADULT IP CONSULT  PHARMACY TO DOSE VANCO  CARE MANAGEMENT / SOCIAL WORK IP CONSULT  NEUROLOGY IP CONSULT  PHYSICAL THERAPY ADULT IP CONSULT  OCCUPATIONAL THERAPY ADULT IP CONSULT    Code Status   Full Code    Time Spent on this Encounter   I, Bienvenido Loya MD, personally saw the patient today and spent greater than 30 minutes discharging this patient.       Bienvenido Loya MD  [place] [address] [place] [address]  Phone: [phone]  ______________________________________________________________________    Physical Exam   Vital Signs: Temp: 98.6  F (37  C) Temp src: Oral BP: (!) 140/90 Pulse: 70   Resp: 19 SpO2: 94 % O2 Device: None (Room air)    Weight: 154 lbs 14.4 oz       Primary Care Physician   Florentin Colby    Discharge Orders      General info for SNF    Length of Stay Estimate: Short Term Care: Estimated # of Days <30  Condition at Discharge: Improving  Level of care:skilled   Rehabilitation Potential: Good  Admission H&P remains valid and up-to-date: Yes  Recent Chemotherapy: N/A  Use Nursing Home Standing Orders: Yes     Mantoux instructions    Give two-step Mantoux (PPD) Per Facility " Policy Yes     Follow Up and recommended labs and tests    Follow up with  outpatient neurology for eval for Parkinsons or other neurodegenerative disease in 2-3 months    Follow up with PCP after discharge from TCU     Reason for your hospital stay    Admit wit meningitis and delirium     Activity - Up with nursing assistance     Full Code     Physical Therapy Adult Consult    Evaluate and treat as clinically indicated.    Reason:  meningitis     Occupational Therapy Adult Consult    Evaluate and treat as clinically indicated.    Reason:  meningitis     Fall precautions     Diet    Follow this diet upon discharge: Orders Placed This Encounter      Combination Diet Regular Diet Adult       Significant Results and Procedures   Most Recent 3 CBC's:  Recent Labs   Lab Test 08/10/24  0557 08/06/24  0442 08/05/24  0712   WBC 9.0 9.4 11.9*   HGB 12.9* 11.4* 10.7*   MCV 93 93 92    159 136*     Most Recent 3 BMP's:  Recent Labs   Lab Test 08/10/24  1152 08/10/24  0810 08/10/24  0557 08/09/24  0754 08/09/24  0543 08/08/24  0905 08/08/24  0450 08/06/24  0731 08/06/24  0442 08/05/24  1322 08/05/24  0712   NA  --   --  138  --   --   --   --   --  135  --  134*   POTASSIUM  --   --  4.1  --  4.1  --  4.0   < > 3.7  --  3.6   CHLORIDE  --   --  98  --   --   --   --   --  97*  --  100   CO2  --   --  31*  --   --   --   --   --  30*  --  24   BUN  --   --  21.2  --   --   --   --   --  15.6  --  23.8*   CR  --   --  1.01  --   --   --   --   --  0.97  --  1.12   ANIONGAP  --   --  9  --   --   --   --   --  8  --  10   MELBA  --   --  9.4  --   --   --   --   --  7.8*  --  7.7*   * 133* 137*   < >  --    < >  --    < > 176*   < > 177*    < > = values in this interval not displayed.   ,   Results for orders placed or performed during the hospital encounter of 08/02/24   CT Head w/o Contrast    Narrative    EXAM: CT HEAD W/O CONTRAST  LOCATION: [facility]  DATE: 8/2/2024    INDICATION: Mental  status and generalized weakness  COMPARISON: None.  TECHNIQUE: Routine CT Head without IV contrast. Multiplanar reformats. Dose reduction techniques were used.    FINDINGS:  INTRACRANIAL CONTENTS: No intracranial hemorrhage, extraaxial collection, or mass effect.  No CT evidence of acute infarct. Mild to moderate presumed chronic small vessel ischemic changes. Normal ventricles and sulci for age.     VISUALIZED ORBITS/SINUSES/MASTOIDS: Prior bilateral cataract surgery. Visualized portions of the orbits are otherwise unremarkable. Mild mucosal thickening scattered about the paranasal sinuses. No middle ear or mastoid effusion.    BONES/SOFT TISSUES: No acute abnormality.      Impression    IMPRESSION:  1.  No acute intracranial process.   XR Chest 2 Views    Narrative    EXAM: XR CHEST 2 VIEWS  LOCATION: [facility]  DATE: 8/3/2024    INDICATION: Rule out pneumonia.  COMPARISON: None available.      Impression    IMPRESSION: AP and lateral views of the chest were obtained. Cardiomediastinal silhouette is within normal limits. No suspicious focal pulmonary opacities. No significant pleural effusion or pneumothorax.   MR Brain w/o Contrast    Narrative    EXAM: MR BRAIN W/O CONTRAST  LOCATION: [facility]  DATE: 8/7/2024    INDICATION: AMS, confusion  COMPARISON: 8/2/2024  TECHNIQUE: Routine multiplanar multisequence head MRI without intravenous contrast.    FINDINGS:  INTRACRANIAL CONTENTS: No acute or subacute infarct. No mass, acute hemorrhage, or extra-axial fluid collections. Patchy nonspecific T2/FLAIR hyperintensities within the cerebral white matter most consistent with mild to moderate chronic microvascular   ischemic change. Mild generalized cerebral atrophy. No hydrocephalus. Normal position of the cerebellar tonsils.     SELLA: No abnormality accounting for technique.    OSSEOUS STRUCTURES/SOFT TISSUES: Normal marrow signal. The major intracranial vascular  flow voids are maintained.     ORBITS: No abnormality accounting for technique.     SINUSES/MASTOIDS: Mild mucosal thickening scattered about the paranasal sinuses. No middle ear or mastoid effusion.       Impression    IMPRESSION:  1.  No acute intracranial process.  2.  Generalized brain atrophy and presumed microvascular ischemic changes as detailed above.   Echocardiogram Complete     Value    LVEF  50-55%    Narrative    [id number]  [id number]  [id number]  [id number]^SIMONE^NELSON^ALEE     [facility]  Echocardiography Laboratory  [facility] [place] [address]     Name: SHANE CUNNINGHAM  MRN: [id number]  : 1950  Study Date: 2024 08:16 AM  Age: 74 yrs  Gender: Male  Patient Location: [facility]  Reason For Study: Tachycardia  Ordering Physician: NELSON NICHOLS  Performed By: Hudson Solorio     BSA: 1.9 m2  Height: 69 in  Weight: 171 lb  HR: 99  BP: 118/74 mmHg  ______________________________________________________________________________  Procedure  Complete Portable Echo Adult.  ______________________________________________________________________________  Interpretation Summary     There is mild concentric left ventricular hypertrophy.  The visual ejection fraction is 50-55%.  The left atrium is moderate to severely dilated.  There is mild to moderate (1-2+) mitral regurgitation.  Right ventricular systolic pressure is elevated, consistent with mild  pulmonary hypertension.  Note- Mitral regurg quantifies at mild to moderate but it is possible it may  be higher grade. If concern consider LOY echo  ______________________________________________________________________________  Left Ventricle  The left ventricle is normal in size. There is mild concentric left  ventricular hypertrophy. The visual ejection fraction is 50-55%. Normal left  ventricular wall motion.     Right Ventricle  The right ventricle is normal in size and function.     Atria  The left atrium is moderate  to severely dilated. Right atrial size is normal.     Mitral Valve  There is mild to moderate (1-2+) mitral regurgitation.     Tricuspid Valve  There is trace tricuspid regurgitation. Right ventricular systolic pressure is  elevated, consistent with mild pulmonary hypertension.     Aortic Valve  Normal tricuspid aortic valve.     Pulmonic Valve  The pulmonic valve is not well seen, but is grossly normal.     Vessels  The aortic root is normal size.     Pericardium  The pericardium appears normal.     Rhythm  Sinus rhythm, sinus tach.  ______________________________________________________________________________  MMode/2D Measurements & Calculations     IVSd: 1.3 cm  LVIDd: 5.2 cm  LVIDs: 3.3 cm  LVPWd: 1.2 cm  FS: 35.6 %  LV mass(C)d: 255.8 grams  LV mass(C)dI: 132.4 grams/m2  Ao root diam: 3.4 cm  LA dimension: 4.5 cm  asc Aorta Diam: 3.0 cm  LA/Ao: 1.3  LVOT diam: 2.0 cm  LVOT area: 3.3 cm2  Ao root diam index Ht(cm/m): 2.0  Ao root diam index BSA (cm/m2): 1.8  Asc Ao diam index BSA (cm/m2): 1.5  Asc Ao diam index Ht(cm/m): 1.7  LA Volume (BP): 158.0 ml     LA Volume Index (BP): 81.9 ml/m2  RWT: 0.46  TAPSE: 2.3 cm     Doppler Measurements & Calculations  MV E max ra: 84.4 cm/sec  MV A max ra: 73.7 cm/sec  MV E/A: 1.1  MV dec slope: 452.3 cm/sec2  MV dec time: 0.19 sec  MR PISA: 3.2 cm2  MR ERO: 0.19 cm2  MR volume: 31.9 ml  PA acc time: 0.07 sec  TR max ra: 297.3 cm/sec  TR max P.3 mmHg  E/E' av.6  Lateral E/e': 7.1  Medial E/e': 8.1  RV S Ra: 16.3 cm/sec     ______________________________________________________________________________  Report approved by: Tosha Jackson 2024 09:49 AM             Discharge Medications   Current Discharge Medication List        CONTINUE these medications which have NOT CHANGED    Details   amLODIPine (NORVASC) 5 MG tablet Take 5 mg by mouth 2 times daily      aspirin EC 81 MG EC tablet Take 1 tablet (81 mg) by mouth daily    Associated Diagnoses: Primary  localized osteoarthritis of right knee      atorvastatin (LIPITOR) 20 MG tablet Take 20 mg by mouth daily      lisinopril (ZESTRIL) 20 MG tablet Take 20 mg by mouth daily      metFORMIN (GLUCOPHAGE) 500 MG tablet Take 500 mg by mouth 2 times daily (with meals)      metoprolol (LOPRESSOR) 25 MG tablet Take 1 tablet (25 mg) by mouth 2 times daily  Qty: 60 tablet    Associated Diagnoses: Benign essential hypertension      Omeprazole (PRILOSEC PO) Take 20 mg by mouth every morning       QUEtiapine (SEROQUEL) 50 MG tablet Take 1 tablet (50 mg) by mouth 2 times daily  Qty: 60 tablet, Refills: 0    Associated Diagnoses: Mixed obsessional thoughts and acts      order for DME Equipment being ordered: Walker Wheels () and Walker ()  Treatment Diagnosis: Impaired gait  Qty: 1 each, Refills: 0    Associated Diagnoses: Status post total right knee replacement           STOP taking these medications       cephALEXin (KEFLEX) 500 MG capsule Comments:   Reason for Stopping:         LORazepam (ATIVAN) 0.5 MG tablet Comments:   Reason for Stopping:             Allergies   Allergies   Allergen Reactions    Seasonal Allergies

## 2024-08-10 NOTE — PROGRESS NOTES
Care Management Follow Up    Length of Stay (days): 8    Expected Discharge Date: 08/10/2024     Concerns to be Addressed: discharge planning     Patient plan of care discussed at interdisciplinary rounds: Yes    Anticipated Discharge Disposition: Transitional Care     Anticipated Discharge Services: None  Anticipated Discharge DME: None    Patient/family educated on Medicare website which has current facility and service quality ratings: no  Education Provided on the Discharge Plan: Yes  Patient/Family in Agreement with the Plan: yes    Referrals Placed by CM/SW: Post Acute Facilities  Private pay costs discussed: transportation costs    Additional Information:  Dr Loya reports patient is stable for discharge. [phone] and left a message. Due to time of day, discharge will be arranged for tomorrow.  Met with daughter to organize discharge for tomorrow.  Discussed transportation either medivan or with daughter transporting.  She will provide the transportation.  Plan; Discharge tomorrow, time to be determined once their liaison returns call.     CHARLIE BurchSW      patient seen in bed. 1:1 at bedside. ate breakfast well per CNA. limited history from patient. no food allergies. food preferences noted. patient states uses added salt in diet  April 2023 malnutrition dx noted.  education deferred in this patient with change in mental status

## 2024-08-10 NOTE — PROGRESS NOTES
1900h-0730h:      Pt alert, disoriented to date but knows the current president, intermittent confusion. Neuro intact. O2Sat 98% on RA. Clear bilateral breath sound except diminished in lower lobes. Tolerates regular diet. Normoactive BS x4. Last BM 08/09. Voiding adequately. SBA. No edema on extremities, sensation intact. Blood glucose 136 mg/dl at bedtime.  /94, 183/99. Checked both arms. Denies symptoms. Paged MD. Does not require treatment in the absence of symptoms as per MD. Left sticky note for AM provider.    Plan to discharge to TCU 08/10.  On K, Mg, Ph protocol, within range, next recheck 08/11 @ 6AM

## 2024-08-10 NOTE — PROVIDER NOTIFICATION
hypertensive. /94, 183/99. Checked both arms. Denies symptoms. No PRN available. Nick STRICKLAND. Does not require treatment in the absence of symptoms as per Dr. Alvarado.

## 2024-08-10 NOTE — PLAN OF CARE
August 10, 2024  Shift:[id number]  Negro Aj  74 year old  YOB: 1950    Reason for admission:Delirium [R41.0]  Hyponatremia [E87.1]  JUSTINA (acute kidney injury) (H24) [N17.9]  Sepsis, due to unspecified organism, unspecified whether acute organ dysfunction present (H) [A41.9]    Cognition/Mentation: A&O x 3, disoriented to time  Neuros/CMS:WDL ex for generalized weakness  VS:VSS on RA  Cardiac:WDL  GI:WDL  :WDL ex can be incontinent  Pulmonary:LS diminished   Pain:Denies pain  Drains/Lines:PIV SL  Skin:WDL ex for scattered bruising   Activity:SBA  Diet:Regular, BG checks prior to meals and at bedtime (133, 176, 188) sliding scale and carb coverage  Discharge:Plan to discharge to [facility] Time will be decided tomorrow

## 2024-08-11 VITALS
BODY MASS INDEX: 23.28 KG/M2 | OXYGEN SATURATION: 98 % | HEIGHT: 69 IN | HEART RATE: 72 BPM | WEIGHT: 157.2 LBS | SYSTOLIC BLOOD PRESSURE: 130 MMHG | DIASTOLIC BLOOD PRESSURE: 83 MMHG | TEMPERATURE: 98.2 F | RESPIRATION RATE: 16 BRPM

## 2024-08-11 LAB
GLUCOSE BLDC GLUCOMTR-MCNC: 145 MG/DL (ref 70–99)
GLUCOSE BLDC GLUCOMTR-MCNC: 213 MG/DL (ref 70–99)
MAGNESIUM SERPL-MCNC: 1.9 MG/DL (ref 1.7–2.3)
PHOSPHATE SERPL-MCNC: 3.1 MG/DL (ref 2.5–4.5)
POTASSIUM SERPL-SCNC: 4.5 MMOL/L (ref 3.4–5.3)

## 2024-08-11 PROCEDURE — 84100 ASSAY OF PHOSPHORUS: CPT | Performed by: INTERNAL MEDICINE

## 2024-08-11 PROCEDURE — 250N000013 HC RX MED GY IP 250 OP 250 PS 637: Performed by: HOSPITALIST

## 2024-08-11 PROCEDURE — 250N000013 HC RX MED GY IP 250 OP 250 PS 637: Performed by: INTERNAL MEDICINE

## 2024-08-11 PROCEDURE — 84132 ASSAY OF SERUM POTASSIUM: CPT | Performed by: INTERNAL MEDICINE

## 2024-08-11 PROCEDURE — 250N000013 HC RX MED GY IP 250 OP 250 PS 637

## 2024-08-11 PROCEDURE — 36415 COLL VENOUS BLD VENIPUNCTURE: CPT | Performed by: INTERNAL MEDICINE

## 2024-08-11 PROCEDURE — 83735 ASSAY OF MAGNESIUM: CPT | Performed by: INTERNAL MEDICINE

## 2024-08-11 RX ADMIN — AMLODIPINE BESYLATE 5 MG: 5 TABLET ORAL at 08:24

## 2024-08-11 RX ADMIN — ASPIRIN 81 MG: 81 TABLET, COATED ORAL at 08:24

## 2024-08-11 RX ADMIN — METOPROLOL TARTRATE 25 MG: 25 TABLET, FILM COATED ORAL at 08:25

## 2024-08-11 RX ADMIN — PANTOPRAZOLE SODIUM 40 MG: 40 TABLET, DELAYED RELEASE ORAL at 08:24

## 2024-08-11 RX ADMIN — ATORVASTATIN CALCIUM 20 MG: 20 TABLET, FILM COATED ORAL at 08:24

## 2024-08-11 RX ADMIN — INSULIN ASPART 2 UNITS: 100 INJECTION, SOLUTION INTRAVENOUS; SUBCUTANEOUS at 08:29

## 2024-08-11 RX ADMIN — LISINOPRIL 20 MG: 20 TABLET ORAL at 08:24

## 2024-08-11 ASSESSMENT — ACTIVITIES OF DAILY LIVING (ADL)
ADLS_ACUITY_SCORE: 49

## 2024-08-11 NOTE — PLAN OF CARE
A&O x 3, disoriented to time, intermittent confusion. Afebrile, VSS on RA. Tele: n/a. Stand-by assist w/GB to bathroom. Regular diet w/carb count, tolerating well. PIV SL. LS clear/diminished. Tremulous after ambulating to bathroom, otherwise neuros intact. Denies pain. Voiding adequately, - BM, + flatus. On K/Phos/Mag protocols, all within range this AM. Discharge to TCU today, exact time pending. Continue plan of care.     Goal Outcome Evaluation:      Plan of Care Reviewed With: patient    Overall Patient Progress: improving

## 2024-08-11 NOTE — PLAN OF CARE
"  Problem: Adult Inpatient Plan of Care  Goal: Plan of Care Review  Description: The Plan of Care Review/Shift note should be completed every shift.  The Outcome Evaluation is a brief statement about your assessment that the patient is improving, declining, or no change.  This information will be displayed automatically on your shift  note.  Outcome: Adequate for Care Transition  Goal: Patient-Specific Goal (Individualized)  Description: You can add care plan individualizations to a care plan. Examples of Individualization might be:  \"Parent requests to be called daily at 9am for status\", \"I have a hard time hearing out of my right ear\", or \"Do not touch me to wake me up as it startles  me\".  Outcome: Adequate for Care Transition  Goal: Absence of Hospital-Acquired Illness or Injury  Outcome: Adequate for Care Transition  Intervention: Identify and Manage Fall Risk  Recent Flowsheet Documentation  Taken 8/11/2024 0825 by Alexis Childs RN  Safety Promotion/Fall Prevention: safety round/check completed  Intervention: Prevent Skin Injury  Recent Flowsheet Documentation  Taken 8/11/2024 0825 by Alexis Chidls RN  Body Position: position changed independently  Intervention: Prevent and Manage VTE (Venous Thromboembolism) Risk  Recent Flowsheet Documentation  Taken 8/11/2024 0825 by Alexis Childs RN  VTE Prevention/Management: SCDs off (sequential compression devices)  Intervention: Prevent Infection  Recent Flowsheet Documentation  Taken 8/11/2024 0825 by Alexis Childs RN  Infection Prevention:   hand hygiene promoted   rest/sleep promoted  Goal: Optimal Comfort and Wellbeing  Outcome: Adequate for Care Transition  Goal: Readiness for Transition of Care  Outcome: Adequate for Care Transition     Problem: Risk for Delirium  Goal: Optimal Coping  Outcome: Adequate for Care Transition  Intervention: Optimize Psychosocial Adjustment to Delirium  Recent Flowsheet Documentation  Taken 8/11/2024 0825 by Naz" Alexis VILLAGRAN RN  Supportive Measures:   positive reinforcement provided   relaxation techniques promoted  Goal: Improved Attention and Thought Clarity  Outcome: Adequate for Care Transition  Intervention: Maximize Cognitive Function  Recent Flowsheet Documentation  Taken 8/11/2024 0825 by Alexis Childs RN  Sensory Stimulation Regulation:   lighting decreased   care clustered   quiet environment promoted  Reorientation Measures:   calendar in view   clock in view   reorientation provided  Goal: Improved Sleep  Outcome: Adequate for Care Transition     Pt. discharged at 1030 to TCU, and left with personal belongings. Pt. received complete discharge paperwork.  Pt. was given times of last dose for all discharge medications in writing on discharge medication sheets. Discharge teaching included all medication, pain management, activity restrictions, and signs and symptoms of infection. Pt. to follow up with PCP in after discharge from TCU. Pt. had no further questions at the time of discharge and no unmet needs were identified.

## 2024-08-12 NOTE — PLAN OF CARE
Occupational Therapy Discharge Summary    Reason for therapy discharge:    Discharged to transitional care facility.    Progress towards therapy goal(s). See goals on Care Plan in [facility] electronic health record for goal details.  Goals partially met.  Barriers to achieving goals:   discharge from facility.    Therapy recommendation(s):    Continued therapy is recommended.  Rationale/Recommendations:  increased tone today, decreased functional ability for ADL/ IADL and increased confusion. pt reports he was typically indp,doing house work and no DME/AE. currently limited by cognition, balance, tremors, fatigue, activity intolerance. recommend TCU at discharge to progress indp with self care/ADL/IADL. pt does not have 24/7 support at home.    **Pt not seen by discharging therapist on this date, note written based on previous treating therapist's notes and recommendations

## 2024-08-28 NOTE — CONFIDENTIAL NOTE
DIAGNOSIS    meningtis, per pt's daughter, self referred           DATE RECEIVED:  09.18.2024     NOTES (Gather within 2 years) STATUS DETAILS   DISCHARGE SUMMARY from hospital Internal 08.02.2024 [facility]   MEDICATION LIST Internal

## 2024-09-18 ENCOUNTER — PRE VISIT (OUTPATIENT)
Dept: INFECTIOUS DISEASES | Facility: CLINIC | Age: 74
End: 2024-09-18
Payer: COMMERCIAL

## 2024-09-18 ENCOUNTER — MYC MEDICAL ADVICE (OUTPATIENT)
Dept: INFECTIOUS DISEASES | Facility: CLINIC | Age: 74
End: 2024-09-18
Payer: COMMERCIAL

## 2024-09-18 ENCOUNTER — OFFICE VISIT (OUTPATIENT)
Dept: INFECTIOUS DISEASES | Facility: CLINIC | Age: 74
End: 2024-09-18
Attending: STUDENT IN AN ORGANIZED HEALTH CARE EDUCATION/TRAINING PROGRAM
Payer: MEDICARE

## 2024-09-18 VITALS
BODY MASS INDEX: 24.71 KG/M2 | SYSTOLIC BLOOD PRESSURE: 112 MMHG | TEMPERATURE: 97.4 F | DIASTOLIC BLOOD PRESSURE: 75 MMHG | WEIGHT: 164.9 LBS | HEART RATE: 63 BPM | OXYGEN SATURATION: 97 %

## 2024-09-18 DIAGNOSIS — R41.843: ICD-10-CM

## 2024-09-18 DIAGNOSIS — R41.82 ALTERED MENTAL STATUS, UNSPECIFIED ALTERED MENTAL STATUS TYPE: ICD-10-CM

## 2024-09-18 DIAGNOSIS — A87.9 VIRAL MENINGITIS, UNSPECIFIED: Primary | ICD-10-CM

## 2024-09-18 DIAGNOSIS — G93.31 POSTVIRAL FATIGUE SYNDROME: ICD-10-CM

## 2024-09-18 PROCEDURE — G0463 HOSPITAL OUTPT CLINIC VISIT: HCPCS | Performed by: STUDENT IN AN ORGANIZED HEALTH CARE EDUCATION/TRAINING PROGRAM

## 2024-09-18 PROCEDURE — 99204 OFFICE O/P NEW MOD 45 MIN: CPT | Mod: GC | Performed by: STUDENT IN AN ORGANIZED HEALTH CARE EDUCATION/TRAINING PROGRAM

## 2024-09-18 ASSESSMENT — PAIN SCALES - GENERAL: PAINLEVEL: NO PAIN (0)

## 2024-09-18 NOTE — PATIENT INSTRUCTIONS
Wrap up    -continue current cares at home, viral meningitis can take up to a year before people improve back to baseline  -Keep appointment with neurology appointment on October 1st  -Consider seroquel decrease after discussion with psychiatry   -Image brain as necessary per brain doctors

## 2024-09-18 NOTE — TELEPHONE ENCOUNTER
I sent a Nemedia message to be delivered on 9/19 to the patient and family.    We were unable to add arboviral testing as the CSF from his hospitalization at [facility] was discarded.    Deep Davis MD

## 2024-09-18 NOTE — LETTER
9/18/2024       RE: Negro Aj  [address]  [place] [address]     Dear Colleague,    Thank you for referring your patient, Negro Aj, to the [facility]. Please see a copy of my visit note below.      GENERAL ID Clinic New Patient CONSULTATION     Patient:  Negro Aj   Date of birth 1950, Medical record number [id number]  Date of Visit:  09/18/2024           Assessment and Recommendations:   ASSESSMENT:  Likely viral meningitis  -BioFire panel negative, 195 nucleated cells, 25 RBC, 56.3 protein, culture negative  -Negative tick panel (Ehrlichia Babesia Lyme)  -Negative for West Nile virus serum PCR  Possible cognitive impairment/psychomotor slowing  Post viral fatigue    74-year-old male with minimal past medical history pertinent only for OCD status post ECT treatments initially presenting on 8/2 with altered mental status and a febrile state found to have what is thought to be viral meningitis. LP at that time with 195 nucleated cells, 25 RBCs, 56.3 protein, normal glucose when adjusted. This was culture negative, BioFire panel negative, negative tick panel, negative serum West Nile virus PCR. Per family the patient improved on antibiotics and when stopped he had return of altered mentation. Subsequently discharged to TCU where apathy and fatigue increased.  Now at home with family noticing increased apathy, low energy, altered appetite, and overall fatigue.  Family pursued infectious disease evaluation for possible other infectious causes/workup that could be done in light of ongoing symptoms.    Overall discussed the toll viral meningitis has on brain function and how in adults older than 65 this can take up to 12 months to recover to baseline. There is some question based on previous neurology evaluation of some other underlying process such as  "cognitive impairment or possible component of dementia (noted to have labile mood prior to event, history of refractory OCD, etc.). Unfortunately, there is no diagnostic test that would be meaningful at this time that would  other than outlier entities such as neurosyphilis, possible fungal disease, toxoplasma, or other entities that seem significantly less likely at this time. He does have some mild rigidity and lipsmacking which is nonspecific postviral sequelae versus dopaminergic agent versus other entity.  I do agree that neurology evaluation is important and we discussed the possibility of adding on an arboviral panel to existing CSF studies if there is frozen samples. Again, even if arboviral disease was detected this would not change current management strategies.      RECOMMENDATION:  Discuss with [facility] lab about CSF samples and ability to add on arboviral send out to MD  Agree with neurology evaluation  Will discuss Seroquel and dopaminergic medicines with both neurology and psychiatry  Possibly repeat brain MRI per neurology  No further infectious disease workup at this time    Deep Davis MD    Discussed with ID attending Dr. Swanson    ________________________________________________________________    Consult Question: Viral meningitis sequela           History of Present Illness:     74-year-old male with minimal past medical history admitted to [facility] from 8/2 - 8/10 for presumed viral meningitis.    Per admitting H&P \"initially presented febrile to 103.5 with rigors, tachycardia, tachypnea, encephalopathy.\"  No clear meningismus with neck rigidity.  He did endorse a mild headache at that time.  A petechial rash of the bilateral lower extremities was also present.  There was some concern about cutting down a pine tree prior to admission.  Started on ampicillin, ceftriaxone, vancomycin, Decadron as well as the addition of acyclovir for possibility of HSV " encephalitis.  LP was obtained with results as above, no obvious agent identified. ID was consulted and slowly bacterial causes were excluded and antibiotics were ultimately discontinued.  The patient improved enough to be discharged to TCU.    The patient presents today with his daughter who provides some clinical history.  She reiterates that he presented initially with rash, fever and altered mental status.  Prior to this event he was in his normal state of health.  She describes him working on a pine tree where he poked his leg which was thought to be related to the redness.  She endorses no history in her father of immunodeficiency.    Currently, we discussed ongoing symptoms.  The patient states he struggles mostly with dates, time, and a month orientation.  He states he is sleeping some but it is mostly interrupted.  He is on Seroquel for sleep per his daughter.  They both describe lethargic, rundown activity level.  His daughter endorses anhedonia and a more withdrawn nature since his hospitalization.  She describes him having difficulty understanding his phone and not recognizing when it was ringing how to turn it on.  The patient endorses not wanting to see more doctors as everyone has told him he will just get better with time. He has not noticed difficulty moving, stiffness, or any paralysis.    He does have an upcoming neurology appointment on October 1 per his daughter.  He is on Seroquel (stopped at hospital discharge but resumed by PCP).         Review of Systems:   CONSTITUTIONAL:  No fevers or chills currently  GASTROINTESTINAL:  negative for nausea, vomiting, does endorse low appetite  HEME:  No easy bruising  INTEGUMENT:  negative for rash and pruritus  NEURO:  Negative for headache, no neuropathy no paralysis no muscle atrophy  Psychomotor: He does endorse slowing         Past Medical History:     Past Medical History:   Diagnosis Date     Anxiety      Depression with anxiety      High  cholesterol      Hypertension     newly treating in 2016     Knee pain, right      OCD (obsessive compulsive disorder)             Past Surgical History:     Past Surgical History:   Procedure Laterality Date     ARTHROPLASTY KNEE Right 2/9/2016    Procedure: ARTHROPLASTY KNEE;  Surgeon: Tiera Conteh MD;  Location: [facility] OR     ARTHROSCOPY KNEE Right             Family History:     Family History   Problem Relation Age of Onset     Depression Mother      Anxiety Disorder Mother      Bipolar Disorder Mother      Bipolar Disorder Sister      Depression Sister      Depression Sister             Social History:     Social History     Tobacco Use     Smoking status: Never     Smokeless tobacco: Not on file   Substance Use Topics     Alcohol use: Yes     Comment: occasional     History   Sexual Activity     Sexual activity: Never            Current Medications (antimicrobials listed in bold):     No current facility-administered medications for this visit.            Allergies:     Allergies   Allergen Reactions     Seasonal Allergies             Physical Exam:   Vitals were reviewed  Ranges for his vital signs:  Temp:  [97.4  F (36.3  C)] 97.4  F (36.3  C)  Pulse:  [63] 63  BP: (112)/(75) 112/75  SpO2:  [97 %] 97 %    Physical Examination:  GENERAL: Interactive, makes eye contact, sitting up in chair no acute distress  HEENT: Slight facial asymmetry but very subtle, + lipsmacking  EYES: No abnormal ocular movements  ENT: Poor dentition  LUNGS: Clear to auscultation bilaterally.   CARDIOVASCULAR:  Regular rate and rhythm with no murmurs, gallops or rubs.  SKIN:  No acute rashes. No stigmata of endocarditis.  NEUROLOGIC:  Grossly nonfocal. Active x4 extremities         Laboratory Data:     Inflammatory Markers  No lab results found.    Hematology Studies    Recent Labs   Lab Test 08/10/24  0557 08/06/24  0442 08/05/24  0712 08/04/24  0547 08/03/24  0603 08/02/24  2019 08/02/24  2019 10/28/17  0020   WBC 9.0 9.4 11.9*  13.2* 9.1  --  9.4 9.9   ANEU  --   --   --   --   --   --   --  7.2   AEOS  --   --   --   --   --   --   --  0.1   HGB 12.9* 11.4* 10.7* 11.0* 11.5*  --  11.8* 14.2   MCV 93 93 92 91 89  --  90 91    159 136* 123* 120*   < > 138* 237    < > = values in this interval not displayed.       Immune Globulin Studies  No lab results found.    Metabolic Studies     Recent Labs   Lab Test 08/11/24  0602 08/10/24  0557 08/09/24  0543 08/08/24  0450 08/07/24  0532 08/06/24  0442 08/05/24  0712 08/04/24  0547 08/03/24  0603   NA  --  138  --   --   --  135 134* 137 132*   POTASSIUM 4.5 4.1 4.1 4.0 3.8 3.7 3.6 3.9 4.1   CHLORIDE  --  98  --   --   --  97* 100 104 97*   CO2  --  31*  --   --   --  30* 24 24 22   BUN  --  21.2  --   --   --  15.6 23.8* 34.4* 23.9*   CR  --  1.01  --   --   --  0.97 1.12 1.26* 1.29*   GFRESTIMATED  --  79  --   --   --  83 70 61 59*       Hepatic Studies    Recent Labs   Lab Test 08/04/24  0547 08/03/24  0603 08/02/24  2019 10/28/17  0020   BILITOTAL 0.4 1.0 1.3* 0.5   ALKPHOS 77 85 91 86   ALBUMIN 3.6 4.0 4.4 3.7   AST 54* 48* 45 33   ALT 56 50 57 50       Thyroid Studies    Recent Labs   Lab Test 08/03/24  0955 10/28/17  1222   TSH 0.53 0.85       Microbiology:  Tickborne disease panel-Anaplasma, Babesia, Ehrlichia not detected  Lyme negative  Meningitis panel negative  West nile virus PCR serum negative    CSF  Nucleated cells 195  RBCs 25  Protein 53    Urine Studies    Recent Labs   Lab Test 08/09/24  1451 08/06/24  0702 08/02/24  2204   LEUKEST Negative Negative Negative   WBCU  --   --  <1     Imaging  8/7/24 MRI  IMPRESSION:  1.  No acute intracranial process.  2.  Generalized brain atrophy and presumed microvascular ischemic changes as detailed above      This dictation was prepared in part using Dragon recognition software.  As a result errors may occur. When identified these transcription errors have been corrected.  While every attempt is made to correct errors during  dictation, errors may still exist      Infectious Disease Clinic Staff Note: Mr. Aj was seen, examined, and the case was discussed with Dr. Davis, ID Fellow -- I agree with his consultative history and examination, assessment and plan in this outpatient ID Consult note. This note reflects my observations and opinions and the plan outlined fully reflects my approach. I have reviewed the available history, radiology, laboratory results, and reports with the Fellow.      Again, thank you for allowing me to participate in the care of your patient.      Sincerely,    Deep Davis MD

## 2024-09-18 NOTE — PROGRESS NOTES
GENERAL ID Clinic New Patient CONSULTATION     Patient:  Negro Aj   Date of birth 1950, Medical record number [id number]  Date of Visit:  09/18/2024           Assessment and Recommendations:   ASSESSMENT:  Likely viral meningitis  -BioFire panel negative, 195 nucleated cells, 25 RBC, 56.3 protein, culture negative  -Negative tick panel (Ehrlichia Babesia Lyme)  -Negative for West Nile virus serum PCR  Possible cognitive impairment/psychomotor slowing  Post viral fatigue    74-year-old male with minimal past medical history pertinent only for OCD status post ECT treatments initially presenting on 8/2 with altered mental status and a febrile state found to have what is thought to be viral meningitis. LP at that time with 195 nucleated cells, 25 RBCs, 56.3 protein, normal glucose when adjusted. This was culture negative, BioFire panel negative, negative tick panel, negative serum West Nile virus PCR. Per family the patient improved on antibiotics and when stopped he had return of altered mentation. Subsequently discharged to TCU where apathy and fatigue increased.  Now at home with family noticing increased apathy, low energy, altered appetite, and overall fatigue.  Family pursued infectious disease evaluation for possible other infectious causes/workup that could be done in light of ongoing symptoms.    Overall discussed the toll viral meningitis has on brain function and how in adults older than 65 this can take up to 12 months to recover to baseline. There is some question based on previous neurology evaluation of some other underlying process such as cognitive impairment or possible component of dementia (noted to have labile mood prior to event, history of refractory OCD, etc.). Unfortunately, there is no diagnostic test that would be meaningful at this time that would  other than outlier entities such as neurosyphilis, possible fungal disease, toxoplasma, or other entities  "that seem significantly less likely at this time. He does have some mild rigidity and lipsmacking which is nonspecific postviral sequelae versus dopaminergic agent versus other entity.  I do agree that neurology evaluation is important and we discussed the possibility of adding on an arboviral panel to existing CSF studies if there is frozen samples. Again, even if arboviral disease was detected this would not change current management strategies.      RECOMMENDATION:  Discuss with [facility] lab about CSF samples and ability to add on arboviral send out to MD  Agree with neurology evaluation  Will discuss Seroquel and dopaminergic medicines with both neurology and psychiatry  Possibly repeat brain MRI per neurology  No further infectious disease workup at this time    Deep Davis MD    Discussed with ID attending Dr. Swanson    ________________________________________________________________    Consult Question: Viral meningitis sequela           History of Present Illness:     74-year-old male with minimal past medical history admitted to [facility] from 8/2 - 8/10 for presumed viral meningitis.    Per admitting H&P \"initially presented febrile to 103.5 with rigors, tachycardia, tachypnea, encephalopathy.\"  No clear meningismus with neck rigidity.  He did endorse a mild headache at that time.  A petechial rash of the bilateral lower extremities was also present.  There was some concern about cutting down a pine tree prior to admission.  Started on ampicillin, ceftriaxone, vancomycin, Decadron as well as the addition of acyclovir for possibility of HSV encephalitis.  LP was obtained with results as above, no obvious agent identified. ID was consulted and slowly bacterial causes were excluded and antibiotics were ultimately discontinued.  The patient improved enough to be discharged to TCU.    The patient presents today with his daughter who provides some clinical history.  She reiterates that he presented " initially with rash, fever and altered mental status.  Prior to this event he was in his normal state of health.  She describes him working on a pine tree where he poked his leg which was thought to be related to the redness.  She endorses no history in her father of immunodeficiency.    Currently, we discussed ongoing symptoms.  The patient states he struggles mostly with dates, time, and a month orientation.  He states he is sleeping some but it is mostly interrupted.  He is on Seroquel for sleep per his daughter.  They both describe lethargic, rundown activity level.  His daughter endorses anhedonia and a more withdrawn nature since his hospitalization.  She describes him having difficulty understanding his phone and not recognizing when it was ringing how to turn it on.  The patient endorses not wanting to see more doctors as everyone has told him he will just get better with time. He has not noticed difficulty moving, stiffness, or any paralysis.    He does have an upcoming neurology appointment on October 1 per his daughter.  He is on Seroquel (stopped at hospital discharge but resumed by PCP).         Review of Systems:   CONSTITUTIONAL:  No fevers or chills currently  GASTROINTESTINAL:  negative for nausea, vomiting, does endorse low appetite  HEME:  No easy bruising  INTEGUMENT:  negative for rash and pruritus  NEURO:  Negative for headache, no neuropathy no paralysis no muscle atrophy  Psychomotor: He does endorse slowing         Past Medical History:     Past Medical History:   Diagnosis Date    Anxiety     Depression with anxiety     High cholesterol     Hypertension     newly treating in 2016    Knee pain, right     OCD (obsessive compulsive disorder)             Past Surgical History:     Past Surgical History:   Procedure Laterality Date    ARTHROPLASTY KNEE Right 2/9/2016    Procedure: ARTHROPLASTY KNEE;  Surgeon: Tiera Conteh MD;  Location: [facility] OR    ARTHROSCOPY KNEE Right             Family  History:     Family History   Problem Relation Age of Onset    Depression Mother     Anxiety Disorder Mother     Bipolar Disorder Mother     Bipolar Disorder Sister     Depression Sister     Depression Sister             Social History:     Social History     Tobacco Use    Smoking status: Never    Smokeless tobacco: Not on file   Substance Use Topics    Alcohol use: Yes     Comment: occasional     History   Sexual Activity    Sexual activity: Never            Current Medications (antimicrobials listed in bold):     No current facility-administered medications for this visit.            Allergies:     Allergies   Allergen Reactions    Seasonal Allergies             Physical Exam:   Vitals were reviewed  Ranges for his vital signs:  Temp:  [97.4  F (36.3  C)] 97.4  F (36.3  C)  Pulse:  [63] 63  BP: (112)/(75) 112/75  SpO2:  [97 %] 97 %    Physical Examination:  GENERAL: Interactive, makes eye contact, sitting up in chair no acute distress  HEENT: Slight facial asymmetry but very subtle, + lipsmacking  EYES: No abnormal ocular movements  ENT: Poor dentition  LUNGS: Clear to auscultation bilaterally.   CARDIOVASCULAR:  Regular rate and rhythm with no murmurs, gallops or rubs.  SKIN:  No acute rashes. No stigmata of endocarditis.  NEUROLOGIC:  Grossly nonfocal. Active x4 extremities         Laboratory Data:     Inflammatory Markers  No lab results found.    Hematology Studies    Recent Labs   Lab Test 08/10/24  0557 08/06/24  0442 08/05/24  0712 08/04/24  0547 08/03/24  0603 08/02/24  2019 08/02/24  2019 10/28/17  0020   WBC 9.0 9.4 11.9* 13.2* 9.1  --  9.4 9.9   ANEU  --   --   --   --   --   --   --  7.2   AEOS  --   --   --   --   --   --   --  0.1   HGB 12.9* 11.4* 10.7* 11.0* 11.5*  --  11.8* 14.2   MCV 93 93 92 91 89  --  90 91    159 136* 123* 120*   < > 138* 237    < > = values in this interval not displayed.       Immune Globulin Studies  No lab results found.    Metabolic Studies     Recent Labs   Lab  Test 08/11/24  0602 08/10/24  0557 08/09/24  0543 08/08/24  0450 08/07/24  0532 08/06/24  0442 08/05/24  0712 08/04/24  0547 08/03/24  0603   NA  --  138  --   --   --  135 134* 137 132*   POTASSIUM 4.5 4.1 4.1 4.0 3.8 3.7 3.6 3.9 4.1   CHLORIDE  --  98  --   --   --  97* 100 104 97*   CO2  --  31*  --   --   --  30* 24 24 22   BUN  --  21.2  --   --   --  15.6 23.8* 34.4* 23.9*   CR  --  1.01  --   --   --  0.97 1.12 1.26* 1.29*   GFRESTIMATED  --  79  --   --   --  83 70 61 59*       Hepatic Studies    Recent Labs   Lab Test 08/04/24  0547 08/03/24  0603 08/02/24  2019 10/28/17  0020   BILITOTAL 0.4 1.0 1.3* 0.5   ALKPHOS 77 85 91 86   ALBUMIN 3.6 4.0 4.4 3.7   AST 54* 48* 45 33   ALT 56 50 57 50       Thyroid Studies    Recent Labs   Lab Test 08/03/24  0955 10/28/17  1222   TSH 0.53 0.85       Microbiology:  Tickborne disease panel-Anaplasma, Babesia, Ehrlichia not detected  Lyme negative  Meningitis panel negative  West nile virus PCR serum negative    CSF  Nucleated cells 195  RBCs 25  Protein 53    Urine Studies    Recent Labs   Lab Test 08/09/24  1451 08/06/24  0702 08/02/24  2204   LEUKEST Negative Negative Negative   WBCU  --   --  <1     Imaging  8/7/24 MRI  IMPRESSION:  1.  No acute intracranial process.  2.  Generalized brain atrophy and presumed microvascular ischemic changes as detailed above      This dictation was prepared in part using Dragon recognition software.  As a result errors may occur. When identified these transcription errors have been corrected.  While every attempt is made to correct errors during dictation, errors may still exist

## 2024-09-23 NOTE — PROGRESS NOTES
Infectious Disease Clinic Staff Note: Mr. Aj was seen, examined, and the case was discussed with Dr. Davis, ID Fellow -- I agree with his consultative history and examination, assessment and plan in this outpatient ID Consult note. This note reflects my observations and opinions and the plan outlined fully reflects my approach. I have reviewed the available history, radiology, laboratory results, and reports with the Fellow.

## 2024-12-22 ENCOUNTER — HEALTH MAINTENANCE LETTER (OUTPATIENT)
Age: 74
End: 2024-12-22

## 2025-03-21 NOTE — PLAN OF CARE
Problem: Depressive Symptoms  Goal: Depressive Symptoms  Signs and symptoms of listed problems will be absent or manageable.   Outcome: No Change  Patient reports he is still feeling the same. He endorses SI and HI. He spent the majority of the shift in his room bed resting. He attended activity group and spirituality group with proper participation.        HANNAH on CKD  Acute on chronic CHF  HTN with CKD   HLD    -HANNAH on CKD, may be related to CRS with acute on chronic CHF  -SCr is 1.6 currently, no baseline to compare in sunrise but he has known CKD  -Renal US-No hydronephrosis or other acute finding. Multiple simple bilateral renal cysts  -Labs in am  -BP is lower end, monitor, recs above  -Remains on farxiga   -Changed to Oral Lasix 40mg daily

## 2025-03-23 ENCOUNTER — HEALTH MAINTENANCE LETTER (OUTPATIENT)
Age: 75
End: 2025-03-23

## 2025-04-09 ENCOUNTER — LAB REQUISITION (OUTPATIENT)
Dept: LAB | Facility: CLINIC | Age: 75
End: 2025-04-09
Payer: MEDICARE

## 2025-04-09 DIAGNOSIS — I67.9 CEREBROVASCULAR DISEASE, UNSPECIFIED: ICD-10-CM

## 2025-04-09 DIAGNOSIS — D64.9 ANEMIA, UNSPECIFIED: ICD-10-CM

## 2025-04-09 DIAGNOSIS — E11.9 TYPE 2 DIABETES MELLITUS WITHOUT COMPLICATIONS (H): ICD-10-CM

## 2025-04-09 DIAGNOSIS — I61.9 NONTRAUMATIC INTRACEREBRAL HEMORRHAGE, UNSPECIFIED (H): ICD-10-CM

## 2025-04-09 DIAGNOSIS — I48.91 UNSPECIFIED ATRIAL FIBRILLATION (H): ICD-10-CM

## 2025-04-09 DIAGNOSIS — I10 ESSENTIAL (PRIMARY) HYPERTENSION: ICD-10-CM

## 2025-04-15 LAB
ANION GAP SERPL CALCULATED.3IONS-SCNC: 12 MMOL/L (ref 7–15)
BUN SERPL-MCNC: 22.7 MG/DL (ref 8–23)
CALCIUM SERPL-MCNC: 8.6 MG/DL (ref 8.8–10.4)
CHLORIDE SERPL-SCNC: 104 MMOL/L (ref 98–107)
CREAT SERPL-MCNC: 1.07 MG/DL (ref 0.67–1.17)
EGFRCR SERPLBLD CKD-EPI 2021: 74 ML/MIN/1.73M2
ERYTHROCYTE [DISTWIDTH] IN BLOOD BY AUTOMATED COUNT: 15.3 % (ref 10–15)
EST. AVERAGE GLUCOSE BLD GHB EST-MCNC: 232 MG/DL
GLUCOSE SERPL-MCNC: 212 MG/DL (ref 70–99)
HBA1C MFR BLD: 9.7 %
HCO3 SERPL-SCNC: 25 MMOL/L (ref 22–29)
HCT VFR BLD AUTO: 38.9 % (ref 40–53)
HGB BLD-MCNC: 12.2 G/DL (ref 13.3–17.7)
MCH RBC QN AUTO: 31.2 PG (ref 26.5–33)
MCHC RBC AUTO-ENTMCNC: 31.4 G/DL (ref 31.5–36.5)
MCV RBC AUTO: 100 FL (ref 78–100)
PLATELET # BLD AUTO: 139 10E3/UL (ref 150–450)
POTASSIUM SERPL-SCNC: 4.5 MMOL/L (ref 3.4–5.3)
RBC # BLD AUTO: 3.91 10E6/UL (ref 4.4–5.9)
SODIUM SERPL-SCNC: 141 MMOL/L (ref 135–145)
WBC # BLD AUTO: 8.1 10E3/UL (ref 4–11)

## 2025-05-02 ENCOUNTER — LAB REQUISITION (OUTPATIENT)
Dept: LAB | Facility: CLINIC | Age: 75
End: 2025-05-02
Payer: MEDICARE

## 2025-05-02 DIAGNOSIS — R30.0 DYSURIA: ICD-10-CM

## 2025-05-02 LAB
ALBUMIN UR-MCNC: NEGATIVE MG/DL
APPEARANCE UR: CLEAR
BACTERIA #/AREA URNS HPF: ABNORMAL /HPF
BILIRUB UR QL STRIP: NEGATIVE
COLOR UR AUTO: ABNORMAL
GLUCOSE UR STRIP-MCNC: 200 MG/DL
HGB UR QL STRIP: NEGATIVE
KETONES UR STRIP-MCNC: NEGATIVE MG/DL
LEUKOCYTE ESTERASE UR QL STRIP: NEGATIVE
NITRATE UR QL: NEGATIVE
PH UR STRIP: 6.5 [PH] (ref 5–7)
RBC URINE: 0 /HPF
SP GR UR STRIP: 1.01 (ref 1–1.03)
UROBILINOGEN UR STRIP-MCNC: NORMAL MG/DL
WBC URINE: 2 /HPF

## 2025-05-02 PROCEDURE — 81001 URINALYSIS AUTO W/SCOPE: CPT | Mod: ORL | Performed by: PHYSICIAN ASSISTANT

## 2025-06-15 ENCOUNTER — HEALTH MAINTENANCE LETTER (OUTPATIENT)
Age: 75
End: 2025-06-15

## 2025-06-18 ENCOUNTER — LAB REQUISITION (OUTPATIENT)
Dept: LAB | Facility: CLINIC | Age: 75
End: 2025-06-18
Payer: MEDICARE

## 2025-06-18 DIAGNOSIS — E11.9 TYPE 2 DIABETES MELLITUS WITHOUT COMPLICATIONS (H): ICD-10-CM

## 2025-06-18 DIAGNOSIS — D64.9 ANEMIA, UNSPECIFIED: ICD-10-CM

## 2025-06-18 DIAGNOSIS — R61 GENERALIZED HYPERHIDROSIS: ICD-10-CM

## 2025-06-18 DIAGNOSIS — I10 ESSENTIAL (PRIMARY) HYPERTENSION: ICD-10-CM

## 2025-06-18 DIAGNOSIS — D69.6 THROMBOCYTOPENIA, UNSPECIFIED: ICD-10-CM

## 2025-06-24 LAB
ANION GAP SERPL CALCULATED.3IONS-SCNC: 11 MMOL/L (ref 7–15)
BUN SERPL-MCNC: 18.3 MG/DL (ref 8–23)
CALCIUM SERPL-MCNC: 9 MG/DL (ref 8.8–10.4)
CHLORIDE SERPL-SCNC: 103 MMOL/L (ref 98–107)
CREAT SERPL-MCNC: 1.02 MG/DL (ref 0.67–1.17)
EGFRCR SERPLBLD CKD-EPI 2021: 78 ML/MIN/1.73M2
ERYTHROCYTE [DISTWIDTH] IN BLOOD BY AUTOMATED COUNT: 14.3 % (ref 10–15)
EST. AVERAGE GLUCOSE BLD GHB EST-MCNC: 194 MG/DL
GLUCOSE SERPL-MCNC: 250 MG/DL (ref 70–99)
HBA1C MFR BLD: 8.4 %
HCO3 SERPL-SCNC: 28 MMOL/L (ref 22–29)
HCT VFR BLD AUTO: 36.1 % (ref 40–53)
HGB BLD-MCNC: 11.1 G/DL (ref 13.3–17.7)
MCH RBC QN AUTO: 31.1 PG (ref 26.5–33)
MCHC RBC AUTO-ENTMCNC: 30.7 G/DL (ref 31.5–36.5)
MCV RBC AUTO: 101 FL (ref 78–100)
PLATELET # BLD AUTO: 164 10E3/UL (ref 150–450)
POTASSIUM SERPL-SCNC: 4 MMOL/L (ref 3.4–5.3)
RBC # BLD AUTO: 3.57 10E6/UL (ref 4.4–5.9)
SODIUM SERPL-SCNC: 142 MMOL/L (ref 135–145)
TSH SERPL DL<=0.005 MIU/L-ACNC: 2.24 UIU/ML (ref 0.3–4.2)
WBC # BLD AUTO: 6.5 10E3/UL (ref 4–11)

## (undated) RX ORDER — LABETALOL HYDROCHLORIDE 5 MG/ML
INJECTION, SOLUTION INTRAVENOUS
Status: DISPENSED
Start: 2017-11-10

## (undated) RX ORDER — KETOROLAC TROMETHAMINE 30 MG/ML
INJECTION, SOLUTION INTRAMUSCULAR; INTRAVENOUS
Status: DISPENSED
Start: 2017-11-03